# Patient Record
Sex: FEMALE | Race: WHITE | NOT HISPANIC OR LATINO | Employment: UNEMPLOYED | URBAN - METROPOLITAN AREA
[De-identification: names, ages, dates, MRNs, and addresses within clinical notes are randomized per-mention and may not be internally consistent; named-entity substitution may affect disease eponyms.]

---

## 2020-10-14 ENCOUNTER — PATIENT MESSAGE (OUTPATIENT)
Dept: FAMILY MEDICINE | Facility: CLINIC | Age: 65
End: 2020-10-14

## 2020-10-20 ENCOUNTER — OFFICE VISIT (OUTPATIENT)
Dept: FAMILY MEDICINE | Facility: CLINIC | Age: 65
End: 2020-10-20
Payer: COMMERCIAL

## 2020-10-20 ENCOUNTER — TELEPHONE (OUTPATIENT)
Dept: ADMINISTRATIVE | Facility: HOSPITAL | Age: 65
End: 2020-10-20

## 2020-10-20 ENCOUNTER — PATIENT OUTREACH (OUTPATIENT)
Dept: ADMINISTRATIVE | Facility: HOSPITAL | Age: 65
End: 2020-10-20

## 2020-10-20 VITALS
DIASTOLIC BLOOD PRESSURE: 97 MMHG | WEIGHT: 145.5 LBS | TEMPERATURE: 98 F | SYSTOLIC BLOOD PRESSURE: 158 MMHG | RESPIRATION RATE: 18 BRPM | OXYGEN SATURATION: 98 % | HEART RATE: 76 BPM

## 2020-10-20 DIAGNOSIS — Z12.39 ENCOUNTER FOR SCREENING FOR MALIGNANT NEOPLASM OF BREAST, UNSPECIFIED SCREENING MODALITY: ICD-10-CM

## 2020-10-20 DIAGNOSIS — K21.9 GASTROESOPHAGEAL REFLUX DISEASE, UNSPECIFIED WHETHER ESOPHAGITIS PRESENT: ICD-10-CM

## 2020-10-20 DIAGNOSIS — Z00.00 HEALTHCARE MAINTENANCE: Primary | ICD-10-CM

## 2020-10-20 DIAGNOSIS — M06.9 RHEUMATOID ARTHRITIS INVOLVING BOTH HANDS, UNSPECIFIED WHETHER RHEUMATOID FACTOR PRESENT: ICD-10-CM

## 2020-10-20 DIAGNOSIS — R03.0 ELEVATED BLOOD PRESSURE READING: ICD-10-CM

## 2020-10-20 DIAGNOSIS — E78.5 HYPERLIPIDEMIA, UNSPECIFIED HYPERLIPIDEMIA TYPE: ICD-10-CM

## 2020-10-20 PROCEDURE — 99204 OFFICE O/P NEW MOD 45 MIN: CPT | Mod: S$GLB,,, | Performed by: INTERNAL MEDICINE

## 2020-10-20 PROCEDURE — 99999 PR PBB SHADOW E&M-EST. PATIENT-LVL V: CPT | Mod: PBBFAC,,, | Performed by: INTERNAL MEDICINE

## 2020-10-20 PROCEDURE — 99999 PR PBB SHADOW E&M-EST. PATIENT-LVL V: ICD-10-PCS | Mod: PBBFAC,,, | Performed by: INTERNAL MEDICINE

## 2020-10-20 PROCEDURE — 99204 PR OFFICE/OUTPT VISIT, NEW, LEVL IV, 45-59 MIN: ICD-10-PCS | Mod: S$GLB,,, | Performed by: INTERNAL MEDICINE

## 2020-10-20 RX ORDER — GINGER ROOT/GINGER ROOT EXT 262.5 MG
25000 CAPSULE ORAL
COMMUNITY

## 2020-10-20 RX ORDER — PANTOPRAZOLE SODIUM 40 MG/1
40 TABLET, DELAYED RELEASE ORAL DAILY
Qty: 90 TABLET | Refills: 1 | Status: SHIPPED | OUTPATIENT
Start: 2020-10-20 | End: 2021-04-15 | Stop reason: SDUPTHER

## 2020-10-20 RX ORDER — ATORVASTATIN CALCIUM 20 MG/1
20 TABLET, FILM COATED ORAL DAILY
Qty: 90 TABLET | Refills: 1 | Status: SHIPPED | OUTPATIENT
Start: 2020-10-20 | End: 2021-04-20 | Stop reason: SDUPTHER

## 2020-10-20 RX ORDER — CETIRIZINE HYDROCHLORIDE 10 MG/1
10 TABLET ORAL
COMMUNITY

## 2020-10-20 RX ORDER — ATORVASTATIN CALCIUM 20 MG/1
20 TABLET, FILM COATED ORAL DAILY
COMMUNITY
End: 2020-10-20 | Stop reason: SDUPTHER

## 2020-10-20 RX ORDER — PANTOPRAZOLE SODIUM 40 MG/1
TABLET, DELAYED RELEASE ORAL
COMMUNITY
Start: 2019-12-30 | End: 2020-10-20 | Stop reason: SDUPTHER

## 2020-10-20 RX ORDER — LANOLIN ALCOHOL/MO/W.PET/CERES
1000 CREAM (GRAM) TOPICAL
COMMUNITY

## 2020-10-20 RX ORDER — FOLIC ACID 1 MG/1
1000 TABLET ORAL
COMMUNITY
Start: 2019-11-25 | End: 2020-11-03 | Stop reason: SDUPTHER

## 2020-10-20 RX ORDER — ZINC GLUCONATE 50 MG
50 TABLET ORAL
COMMUNITY

## 2020-10-20 RX ORDER — HYDROXYCHLOROQUINE SULFATE 200 MG/1
200 TABLET, FILM COATED ORAL DAILY
COMMUNITY
End: 2020-11-03 | Stop reason: SDUPTHER

## 2020-10-20 RX ORDER — ASCORBIC ACID 500 MG
500 TABLET ORAL
COMMUNITY

## 2020-10-20 RX ORDER — VITAMIN E 268 MG
400 CAPSULE ORAL
COMMUNITY

## 2020-10-20 RX ORDER — CALCIUM CARBONATE 600 MG
600 TABLET ORAL
COMMUNITY

## 2020-10-20 RX ORDER — GARLIC 1000 MG
CAPSULE ORAL
COMMUNITY
End: 2022-04-25

## 2020-10-20 RX ORDER — AMOXICILLIN 500 MG
2 CAPSULE ORAL
COMMUNITY

## 2020-10-20 RX ORDER — SULFASALAZINE 500 MG/1
1000 TABLET ORAL 2 TIMES DAILY
COMMUNITY
End: 2020-11-03 | Stop reason: SDUPTHER

## 2020-10-20 RX ORDER — METHOTREXATE 2.5 MG/1
TABLET ORAL
COMMUNITY
End: 2020-11-03 | Stop reason: SDUPTHER

## 2020-10-20 NOTE — PROGRESS NOTES
HISTORY OF PRESENT ILLNESS:  Lula De Leon is a 64 y.o. female who presents to the clinic today for   Establishing care.    She has moved from [place].    She has RA and had been managed by Dr. Mateus Taylor in [place] previously.  Reports he dx was about 2 years ago.  Notes that her sx have fair control on her current regimen.    She is without other complaints today.    PAST MEDICAL HISTORY:  Past Medical History:   Diagnosis Date    Rheumatoid arthritis        PAST SURGICAL HISTORY:  Past Surgical History:   Procedure Laterality Date    BREAST SURGERY      lobectomy 2017 - [facility]    CARPAL TUNNEL RELEASE Right     SINUS SURGERY  2017    Dr. Christel FLORES and Dr. Chilel       SOCIAL HISTORY:  Social History     Socioeconomic History    Marital status:      Spouse name: Not on file    Number of children: Not on file    Years of education: Not on file    Highest education level: Not on file   Occupational History    Occupation: retired - used to make prosthesis and dental implants   Social Needs    Financial resource strain: Not hard at all    Food insecurity     Worry: Never true     Inability: Never true    Transportation needs     Medical: No     Non-medical: No   Tobacco Use    Smoking status: Never Smoker   Substance and Sexual Activity    Alcohol use: Yes     Frequency: 4 or more times a week     Drinks per session: 1 or 2     Binge frequency: Never    Drug use: Not on file    Sexual activity: Not on file   Lifestyle    Physical activity     Days per week: 0 days     Minutes per session: 0 min    Stress: Only a little   Relationships    Social connections     Talks on phone: More than three times a week     Gets together: More than three times a week     Attends Shinto service: Not on file     Active member of club or organization: No     Attends meetings of clubs or organizations: Never     Relationship status:    Other Topics Concern    Not on file    Social History Narrative    Not on file       FAMILY HISTORY:  Family History   Problem Relation Age of Onset    Arthritis Mother     Aneurysm Mother 64    Heart attack Father 70    Epilepsy Sister        ALLERGIES AND MEDICATIONS: updated and reviewed.  Review of patient's allergies indicates:  No Known Allergies         CARE TEAM:  Patient Care Team:  Sylvain Haywood MD as PCP - General (Internal Medicine)  Ramesh Acuña MA as Care Coordinator  [facility] (Obstetrics)         REVIEW OF SYSTEMS:  Review of Systems   Constitutional: Negative for activity change and unexpected weight change.   HENT: Negative for hearing loss, rhinorrhea and trouble swallowing.    Eyes: Negative for discharge and visual disturbance.   Respiratory: Negative for chest tightness and wheezing.    Cardiovascular: Negative for chest pain and palpitations.   Gastrointestinal: Negative for blood in stool, constipation, diarrhea and vomiting.   Endocrine: Negative for polydipsia and polyuria.   Genitourinary: Negative for difficulty urinating, dysuria, hematuria and menstrual problem.   Musculoskeletal: Positive for arthralgias and joint swelling. Negative for neck pain.   Neurological: Negative for weakness and headaches.   Psychiatric/Behavioral: Negative for confusion and dysphoric mood.         PHYSICAL EXAM:   Vitals:    10/20/20 1110   BP: (!) 158/97   Pulse: 76   Resp: 12   Temp: 97.9             Wt 66 kg     General appearance - alert, well appearing, and in no distress and oriented to person, place, and time  Mental status - normal mood, behavior, speech, dress, motor activity, and thought processes  Eyes - sclera anicteric, left eye normal, right eye normal  Ears - bilateral TM's and external ear canals normal  Mouth - mucous membranes moist, pharynx normal without lesions  Chest - clear to auscultation, no wheezes, rales or rhonchi, symmetric air entry, no tachypnea, retractions or cyanosis  Heart - normal rate,  regular rhythm, normal S1, S2, no murmurs, rubs, clicks or gallops  Abdomen - soft, nontender, nondistended, no masses or organomegaly  Neurological - alert, oriented, normal speech, no focal findings or movement disorder noted  Skin - normal coloration and turgor, no rashes, no suspicious skin lesions noted      ASSESSMENT AND PLAN:  Healthcare maintenance  -     Hemoglobin A1C; Future; Expected date: 10/20/2020  -     Comprehensive Metabolic Panel; Future; Expected date: 10/20/2020  -     Lipid Panel; Future; Expected date: 10/20/2020  -     CBC auto differential; Future; Expected date: 10/20/2020  -     TSH; Future; Expected date: 10/20/2020  -     Cancel: Influenza - Quadrivalent *Preferred* (6 months+) (PF)  -     Hepatitis C Antibody; Future; Expected date: 10/20/2020  -     HIV 1/2 Ag/Ab (4th Gen); Future; Expected date: 10/20/2020  -     Ambulatory referral/consult to Obstetrics / Gynecology; Future; Expected date: 10/27/2020    Elevated blood pressure reading       - Follow up with nurse blood pressure reading and if elevated will have close follow up with me to discuss next steps.    Rheumatoid arthritis involving both hands, unspecified whether rheumatoid factor present  -     Ambulatory referral/consult to Rheumatology; Future; Expected date: 10/27/2020    Encounter for screening for malignant neoplasm of breast, unspecified screening modality  -     Mammo Digital Screening Bilat; Future; Expected date: 10/20/2020    Gastroesophageal reflux disease, unspecified whether esophagitis present  -     pantoprazole (PROTONIX) 40 MG tablet; Take 1 tablet (40 mg total) by mouth once daily.  Dispense: 90 tablet; Refill: 1    Hyperlipidemia, unspecified hyperlipidemia type  -     atorvastatin (LIPITOR) 20 MG tablet; Take 1 tablet (20 mg total) by mouth once daily.  Dispense: 90 tablet; Refill: 1            Follow up 3 months or sooner as needed.

## 2020-10-23 DIAGNOSIS — Z12.11 COLON CANCER SCREENING: ICD-10-CM

## 2020-10-27 ENCOUNTER — HOSPITAL ENCOUNTER (OUTPATIENT)
Dept: RADIOLOGY | Facility: HOSPITAL | Age: 65
Discharge: HOME | End: 2020-10-27
Attending: INTERNAL MEDICINE
Payer: COMMERCIAL

## 2020-10-27 VITALS — HEIGHT: 63 IN | BODY MASS INDEX: 25.69 KG/M2 | WEIGHT: 145 LBS

## 2020-10-27 DIAGNOSIS — Z12.39 ENCOUNTER FOR SCREENING FOR MALIGNANT NEOPLASM OF BREAST, UNSPECIFIED SCREENING MODALITY: ICD-10-CM

## 2020-10-27 DIAGNOSIS — Z12.31 BREAST CANCER SCREENING BY MAMMOGRAM: ICD-10-CM

## 2020-10-27 PROCEDURE — 77067 MAMMO DIGITAL SCREENING BILAT WITH TOMO: ICD-10-PCS | Mod: 26,,, | Performed by: RADIOLOGY

## 2020-10-27 PROCEDURE — 77067 SCR MAMMO BI INCL CAD: CPT | Mod: TC

## 2020-10-27 PROCEDURE — 77063 MAMMO DIGITAL SCREENING BILAT WITH TOMO: ICD-10-PCS | Mod: 26,,, | Performed by: RADIOLOGY

## 2020-10-27 PROCEDURE — 77067 SCR MAMMO BI INCL CAD: CPT | Mod: 26,,, | Performed by: RADIOLOGY

## 2020-10-27 PROCEDURE — 77063 BREAST TOMOSYNTHESIS BI: CPT | Mod: 26,,, | Performed by: RADIOLOGY

## 2020-11-03 ENCOUNTER — OFFICE VISIT (OUTPATIENT)
Dept: RHEUMATOLOGY | Facility: CLINIC | Age: 65
End: 2020-11-03
Payer: COMMERCIAL

## 2020-11-03 ENCOUNTER — HOSPITAL ENCOUNTER (OUTPATIENT)
Dept: RADIOLOGY | Facility: HOSPITAL | Age: 65
Discharge: HOME | End: 2020-11-03
Attending: INTERNAL MEDICINE
Payer: COMMERCIAL

## 2020-11-03 VITALS
WEIGHT: 145.31 LBS | SYSTOLIC BLOOD PRESSURE: 165 MMHG | BODY MASS INDEX: 25.75 KG/M2 | OXYGEN SATURATION: 100 % | RESPIRATION RATE: 18 BRPM | DIASTOLIC BLOOD PRESSURE: 77 MMHG | HEART RATE: 74 BPM | TEMPERATURE: 98 F | HEIGHT: 63 IN

## 2020-11-03 DIAGNOSIS — Z79.899 ENCOUNTER FOR LONG-TERM (CURRENT) USE OF OTHER MEDICATIONS: ICD-10-CM

## 2020-11-03 DIAGNOSIS — M19.90 OSTEOARTHRITIS, UNSPECIFIED OSTEOARTHRITIS TYPE, UNSPECIFIED SITE: ICD-10-CM

## 2020-11-03 DIAGNOSIS — M06.9 RHEUMATOID ARTHRITIS INVOLVING BOTH HANDS, UNSPECIFIED WHETHER RHEUMATOID FACTOR PRESENT: Primary | ICD-10-CM

## 2020-11-03 DIAGNOSIS — M06.9 RHEUMATOID ARTHRITIS INVOLVING BOTH HANDS, UNSPECIFIED WHETHER RHEUMATOID FACTOR PRESENT: ICD-10-CM

## 2020-11-03 DIAGNOSIS — Z71.89 COUNSELING AND COORDINATION OF CARE: ICD-10-CM

## 2020-11-03 PROCEDURE — 3008F BODY MASS INDEX DOCD: CPT | Mod: CPTII,S$GLB,, | Performed by: INTERNAL MEDICINE

## 2020-11-03 PROCEDURE — 73130 PR  X-RAY HAND 3+ VW: ICD-10-PCS | Mod: 26,RT,, | Performed by: RADIOLOGY

## 2020-11-03 PROCEDURE — 73130 X-RAY EXAM OF HAND: CPT | Mod: 26,RT,, | Performed by: RADIOLOGY

## 2020-11-03 PROCEDURE — 73130 X-RAY EXAM OF HAND: CPT | Mod: 26,LT,, | Performed by: RADIOLOGY

## 2020-11-03 PROCEDURE — 73130 X-RAY EXAM OF HAND: CPT | Mod: TC,50,FY

## 2020-11-03 PROCEDURE — 99999 PR PBB SHADOW E&M-EST. PATIENT-LVL V: ICD-10-PCS | Mod: PBBFAC,,, | Performed by: INTERNAL MEDICINE

## 2020-11-03 PROCEDURE — 73630 PR  X-RAY FOOT 3+ VW: ICD-10-PCS | Mod: 26,LT,, | Performed by: RADIOLOGY

## 2020-11-03 PROCEDURE — 73630 X-RAY EXAM OF FOOT: CPT | Mod: TC,50,FY

## 2020-11-03 PROCEDURE — 73630 X-RAY EXAM OF FOOT: CPT | Mod: 26,RT,, | Performed by: RADIOLOGY

## 2020-11-03 PROCEDURE — 99204 OFFICE O/P NEW MOD 45 MIN: CPT | Mod: S$GLB,,, | Performed by: INTERNAL MEDICINE

## 2020-11-03 PROCEDURE — 99204 PR OFFICE/OUTPT VISIT, NEW, LEVL IV, 45-59 MIN: ICD-10-PCS | Mod: S$GLB,,, | Performed by: INTERNAL MEDICINE

## 2020-11-03 PROCEDURE — 73630 X-RAY EXAM OF FOOT: CPT | Mod: 26,LT,, | Performed by: RADIOLOGY

## 2020-11-03 PROCEDURE — 3008F PR BODY MASS INDEX (BMI) DOCUMENTED: ICD-10-PCS | Mod: CPTII,S$GLB,, | Performed by: INTERNAL MEDICINE

## 2020-11-03 PROCEDURE — 99999 PR PBB SHADOW E&M-EST. PATIENT-LVL V: CPT | Mod: PBBFAC,,, | Performed by: INTERNAL MEDICINE

## 2020-11-03 RX ORDER — METHOTREXATE 2.5 MG/1
10 TABLET ORAL
Qty: 48 TABLET | Refills: 1 | Status: SHIPPED | OUTPATIENT
Start: 2020-11-03 | End: 2021-02-03 | Stop reason: SDUPTHER

## 2020-11-03 RX ORDER — FOLIC ACID 1 MG/1
1 TABLET ORAL DAILY
Qty: 90 TABLET | Refills: 1 | Status: SHIPPED | OUTPATIENT
Start: 2020-11-03 | End: 2021-02-03 | Stop reason: SDUPTHER

## 2020-11-03 RX ORDER — SULFASALAZINE 500 MG/1
500 TABLET ORAL 2 TIMES DAILY
Qty: 180 TABLET | Refills: 1 | Status: SHIPPED | OUTPATIENT
Start: 2020-11-03 | End: 2021-02-03 | Stop reason: SDUPTHER

## 2020-11-03 RX ORDER — HYDROXYCHLOROQUINE SULFATE 200 MG/1
200 TABLET, FILM COATED ORAL DAILY
Qty: 90 TABLET | Refills: 1 | Status: SHIPPED | OUTPATIENT
Start: 2020-11-03 | End: 2021-02-03 | Stop reason: SDUPTHER

## 2020-11-03 ASSESSMENT — CLINICAL DISEASE ACTIVITY INDEX (CDAI)
TENDER_JOINTS_COUNT: 0
PHYSICIAN_ASSESSMENT: 1
PATIENT_ASSESSMENT: 1
SWOLLEN_JOINTS_COUNT: 0
TOTAL_SCORE: 2

## 2020-11-03 NOTE — LETTER
November 3, 2020      Sylvain Haywood MD  [address] [facility] [address]           [facility] - Rheumatology  [address] [facility]  [facility] [address]  Fax: [phone]          Patient: Lula De Leon   MR Number: [id number]   YOB: 1955   Date of Visit: 11/3/2020       Dear Dr. Sylvain Haywood:    Thank you for referring Lula De Leon to me for evaluation. Attached you will find relevant portions of my assessment and plan of care.    If you have questions, please do not hesitate to call me. I look forward to following Lula De Leon along with you.    Sincerely,    Ata Hooks MD    Enclosure  CC:  No Recipients    If you would like to receive this communication electronically, please contact [email or web address] or [phone] to request more information on [employer] access.    For providers and/or their staff who would like to refer a patient to [facility], please contact us through our one-stop-shop provider referral line, [facility], at [phone].    If you feel you have received this communication in error or would no longer like to receive these types of communications, please e-mail [email or web address]

## 2020-11-03 NOTE — PROGRESS NOTES
RHEUMATOLOGY OUTPATIENT CLINIC NOTE    11/3/2020    Attending Rheumatologist: Ata Hooks  Primary Care Provider: Sylvain Haywood MD   Physician Requesting Consultation: Sylvain Haywood MD  [address] [place] [address]  Chief Complaint/Reason For Consultation:  No chief complaint on file.      Subjective:       HPI  Lula De Leon is a 64 y.o. White female with Hx of Seropositive RA who comes to hospitals care.     Patient diagnosed two years ago. Reports it started in the wrist as swelling and pain, could barely move it. Then was seen by a Rheumatologist which placed her on therapy eventually escalating to Triple Therapy (SSZ, MTX, HCQ) for control. Was on MTX 8 tabs, SSZ 4 and decreased to MTX 4 and SSZ 2 in August due to Renal Function.   Patient reports good response on current therapy. Denies joint swelling, tenderness. Reports about 10-15 of morning stiffness. Reports also lower back and knee pain. Most typically with use. Was prior on NSAIDs but stopped, now on Tylenol with good relief.   FHx: Possible Mom, and Aunt with RA     Review of Systems   Constitutional: Negative for appetite change, chills, fatigue, fever and unexpected weight change.   HENT: Negative for nasal congestion, ear discharge, ear pain, hearing loss, mouth sores, nosebleeds, sneezing, sore throat, tinnitus and trouble swallowing.    Eyes: Negative for photophobia, pain, discharge, redness, itching and visual disturbance.   Respiratory: Negative for cough, chest tightness, shortness of breath and wheezing.    Cardiovascular: Negative for chest pain, palpitations and leg swelling.   Gastrointestinal: Negative for abdominal distention, abdominal pain, blood in stool, constipation, diarrhea, nausea and vomiting.   Endocrine: Negative for cold intolerance, heat intolerance, polydipsia, polyphagia and polyuria.   Genitourinary: Negative for difficulty urinating, dyspareunia, dysuria, flank pain, frequency, genital  sores, hematuria, menstrual problem, pelvic pain, urgency, vaginal bleeding, vaginal discharge, vaginal pain and vaginal dryness.   Musculoskeletal: Positive for arthralgias and back pain. Negative for gait problem, joint swelling, leg pain, myalgias, neck pain, neck stiffness and joint deformity.   Integumentary:  Negative for pallor and rash.   Neurological: Negative for dizziness, seizures, weakness, light-headedness, numbness and headaches.   Hematological: Negative for adenopathy. Does not bruise/bleed easily.   Psychiatric/Behavioral: Negative for confusion, decreased concentration and sleep disturbance. The patient is not nervous/anxious.    All other systems reviewed and are negative.         Chronic comorbid conditions affecting medical decision making today:  Past Medical History:   Diagnosis Date    Rheumatoid arthritis      Past Surgical History:   Procedure Laterality Date    BREAST LUMPECTOMY Right     exc biopsy    BREAST SURGERY      [facility]obectomy 2017 - [facility]    CARPAL TUNNEL RELEASE Right     SINUS SURGERY  2017    Dr. K[place] [facility] and Dr. Chilel     Family History   Problem Relation Age of Onset    Arthritis Mother     Aneurysm Mother 64    Heart attack Father 70    Epilepsy Sister      Social History     Substance and Sexual Activity   Alcohol Use Yes    Frequency: 4 or more times a week    Drinks per session: 1 or 2    Binge frequency: Never     Social History     Tobacco Use   Smoking Status Never Smoker     Social History     Substance and Sexual Activity   Drug Use Not on file       Current Outpatient Medications:     ascorbic acid, vitamin C, (VITAMIN C) 500 MG tablet, Take 500 mg by mouth., Disp: , Rfl:     atorvastatin (LIPITOR) 20 MG tablet, Take 1 tablet (20 mg total) by mouth once daily., Disp: 90 tablet, Rfl: 1    beta carotene 45418 UNIT capsule, Take 25,000 Units by mouth., Disp: , Rfl:     calcium carbonate (OS-ONEIDA) 600 mg calcium (1,500 mg) Tab, Take 600  mg by mouth., Disp: , Rfl:     cetirizine (ZYRTEC) 10 MG tablet, Take 10 mg by mouth., Disp: , Rfl:     cyanocobalamin (VITAMIN B-12) 1000 MCG tablet, Take 1,000 mcg by mouth., Disp: , Rfl:     richa prim-linoleic-gamolenic ac (PRIMROSE OIL) 1,000 mg Cap, Take by mouth., Disp: , Rfl:     flaxseed oil Oil, 800 mg by Other route., Disp: , Rfl:     folic acid (FOLVITE) 1 MG tablet, Take 1,000 mcg by mouth., Disp: , Rfl:     garlic 1,000 mg Cap, Take by mouth., Disp: , Rfl:     hydrOXYchloroQUINE (PLAQUENIL) 200 mg tablet, Take 200 mg by mouth once daily., Disp: , Rfl:     iron bisgly,ps-FA-B-C#12-succ 65 mg-65 mg -1,000 mcg (24) Tab, Take by mouth., Disp: , Rfl:     MAGNESIUM CARBONATE ORAL, Take 600 mg by mouth., Disp: , Rfl:     methotrexate 2.5 MG Tab, Take by mouth every 7 days., Disp: , Rfl:     omega-3 fatty acids/fish oil (FISH OIL-OMEGA-3 FATTY ACIDS) 300-1,000 mg capsule, Take 2 g by mouth., Disp: , Rfl:     pantoprazole (PROTONIX) 40 MG tablet, Take 1 tablet (40 mg total) by mouth once daily., Disp: 90 tablet, Rfl: 1    sulfaSALAzine (AZULFIDINE) 500 mg Tab, Take 1,000 mg by mouth 2 (two) times a day. , Disp: , Rfl:     vitamin E 400 UNIT capsule, Take 400 Units by mouth., Disp: , Rfl:     zinc gluconate 50 mg tablet, Take 50 mg by mouth., Disp: , Rfl:      Objective:         Vitals:    11/03/20 0812   BP: (!) 165/77   Pulse: 74   Resp: 18   Temp: 98 °F (36.7 °C)     Physical Exam   Constitutional: She is oriented to person, place, and time and well-developed, well-nourished, and in no distress.   HENT:   Head: Normocephalic and atraumatic.   Right Ear: External ear normal.   Left Ear: External ear normal.   Nose: Nose normal.   Mouth/Throat: Oropharynx is clear and moist.   Eyes: Conjunctivae and EOM are normal. Pupils are equal, round, and reactive to light.   Neck: Normal range of motion. Neck supple.   Cardiovascular: Normal rate, regular rhythm and intact distal pulses.    Pulmonary/Chest:  Effort normal and breath sounds normal.   Abdominal: Soft. Bowel sounds are normal.       Right Side Rheumatological Exam     Examination finds the shoulder, elbow, wrist, knee, 1st PIP, 1st MCP, 2nd PIP, 2nd MCP, 3rd PIP, 3rd MCP, 4th PIP, 4th MCP, 5th PIP and 5th MCP normal.    Knee Exam   Patellofemoral Crepitus: positive    Left Side Rheumatological Exam     Examination finds the shoulder, elbow, wrist, knee, 1st PIP, 1st MCP, 2nd PIP, 2nd MCP, 3rd PIP, 3rd MCP, 4th PIP, 4th MCP, 5th PIP and 5th MCP normal.    Knee Exam     Patellofemoral Crepitus: positive      Back/Neck Exam   Tenderness Left paramedian tenderness of the Lower L-Spine.      Neurological: She is alert and oriented to person, place, and time.   Skin: No rash noted. No erythema.     Psychiatric: Mood and affect normal.       Reviewed old and all outside pertinent medical records available.    All lab results personally reviewed and interpreted by me.  Lab Results   Component Value Date    WBC 3.76 (L) 10/27/2020    HGB 12.1 10/27/2020    HCT 38.0 10/27/2020    MCV 98 10/27/2020    MCH 31.3 (H) 10/27/2020    MCHC 31.8 (L) 10/27/2020    RDW 13.4 10/27/2020     10/27/2020    MPV 9.3 10/27/2020       Lab Results   Component Value Date     10/27/2020    K 4.3 10/27/2020     10/27/2020    CO2 28 10/27/2020    GLU 99 10/27/2020    BUN 18 10/27/2020    CALCIUM 9.5 10/27/2020    PROT 7.3 10/27/2020    ALBUMIN 3.9 10/27/2020    BILITOT 0.4 10/27/2020    AST 31 10/27/2020    ALKPHOS 49 (L) 10/27/2020    ALT 28 10/27/2020       No results found for: COLORU, APPEARANCEUA, SPECGRAV, PHUR, PROTEINUA, GLUCOSEU, KETONESU, BLOODU, LEUKOCYTESUR, NITRITE, UROBILINOGEN    No results found for: CRP    No results found for: SEDRATE, ERYTHROCYTES    No results found for: LUIS FERNANDO, RF, SEDRATE    No components found for: 25OHVITDTOT, 62TLAPMF7, 36YLHBKV3, METHODNOTE    No results found for: URICACID    No components found for: TSPOTTB    Imaging:  All  imaging reviewed and independently interpreted by me.       ASSESSMENT / PLAN:     Lula De Leon is a 64 y.o. White female with:      1. Rheumatoid arthritis involving both hands, unspecified whether rheumatoid factor present  -Patient with known diagnosis of seropositive RA, discussed current management and her feeling well  -will get baseline labs today as well as hand and foot Xrays   -Continue MTX 10mg weekly + Daily FA (1mg), SSZ 500mg BID daily, HCQ 200mg daily   -reinforced compliance with meds   -CDAI: TJC 0 + SJC 0 + PGA 1 + PhGA 1 = 2 (in remission)   -avoid NSAIDs, Tylenol for breakthrough pain     2. Low Back and Knee Pain likely OA   -discussed disease diagnosis and management   -continue Tylenol PRN  -Reassurance and Exercise     3. DMARD Toxicity Monitoring   -labs today   -Daily FA  -EYE referral for HCQ Retina Screening     4. Other specified counseling  - over 10 minutes spent regarding below topics:  - Immunization counseling done.  - Nutrition and exercise counseling.  - Limitation of alcohol consumption.  - Regular exercise:  Aerobic and resistance.  - Medication counseling provided.      No follow-ups on file.    Method of contact with patient concerns: Des attn Rheumatology    Disclaimer:  This note is prepared using voice recognition software and as such is likely to have errors and has not been proof read. Please contact me for questions.     Time spent: 45 minutes in face to face discussion concerning diagnosis, prognosis, review of lab and test results, benefits of treatment as well as management of disease, counseling of patient and coordination of care between various health care providers.  Greater than half the time spent was used for coordination of care and counseling of patient.    Ata Hooks M.D.  Rheumatology Department   [facility]

## 2020-11-06 ENCOUNTER — TELEPHONE (OUTPATIENT)
Dept: RHEUMATOLOGY | Facility: CLINIC | Age: 65
End: 2020-11-06

## 2021-01-04 ENCOUNTER — PATIENT MESSAGE (OUTPATIENT)
Dept: ADMINISTRATIVE | Facility: HOSPITAL | Age: 66
End: 2021-01-04

## 2021-01-25 PROBLEM — Z00.00 HEALTHCARE MAINTENANCE: Status: RESOLVED | Noted: 2020-10-20 | Resolved: 2021-01-25

## 2021-02-03 ENCOUNTER — OFFICE VISIT (OUTPATIENT)
Dept: RHEUMATOLOGY | Facility: CLINIC | Age: 66
End: 2021-02-03
Payer: COMMERCIAL

## 2021-02-03 ENCOUNTER — TELEPHONE (OUTPATIENT)
Dept: RHEUMATOLOGY | Facility: CLINIC | Age: 66
End: 2021-02-03

## 2021-02-03 ENCOUNTER — LAB VISIT (OUTPATIENT)
Dept: LAB | Facility: HOSPITAL | Age: 66
End: 2021-02-03
Attending: INTERNAL MEDICINE
Payer: COMMERCIAL

## 2021-02-03 VITALS
OXYGEN SATURATION: 98 % | SYSTOLIC BLOOD PRESSURE: 175 MMHG | HEART RATE: 73 BPM | DIASTOLIC BLOOD PRESSURE: 86 MMHG | TEMPERATURE: 98 F | RESPIRATION RATE: 18 BRPM | HEIGHT: 63 IN | WEIGHT: 146.19 LBS | BODY MASS INDEX: 25.9 KG/M2

## 2021-02-03 DIAGNOSIS — Z79.899 ENCOUNTER FOR LONG-TERM (CURRENT) USE OF OTHER MEDICATIONS: ICD-10-CM

## 2021-02-03 DIAGNOSIS — M06.9 RHEUMATOID ARTHRITIS INVOLVING BOTH HANDS, UNSPECIFIED WHETHER RHEUMATOID FACTOR PRESENT: Primary | ICD-10-CM

## 2021-02-03 DIAGNOSIS — M06.9 RHEUMATOID ARTHRITIS INVOLVING BOTH HANDS, UNSPECIFIED WHETHER RHEUMATOID FACTOR PRESENT: ICD-10-CM

## 2021-02-03 DIAGNOSIS — M15.9 OSTEOARTHRITIS OF MULTIPLE JOINTS, UNSPECIFIED OSTEOARTHRITIS TYPE: ICD-10-CM

## 2021-02-03 DIAGNOSIS — Z71.89 COUNSELING AND COORDINATION OF CARE: ICD-10-CM

## 2021-02-03 LAB
ALBUMIN SERPL BCP-MCNC: 4.1 G/DL (ref 3.5–5.2)
ALP SERPL-CCNC: 53 U/L (ref 55–135)
ALT SERPL W/O P-5'-P-CCNC: 50 U/L (ref 10–44)
ANION GAP SERPL CALC-SCNC: 10 MMOL/L (ref 8–16)
AST SERPL-CCNC: 36 U/L (ref 10–40)
BASOPHILS # BLD AUTO: 0.05 K/UL (ref 0–0.2)
BASOPHILS NFR BLD: 0.9 % (ref 0–1.9)
BILIRUB SERPL-MCNC: 0.4 MG/DL (ref 0.1–1)
BUN SERPL-MCNC: 14 MG/DL (ref 8–23)
CALCIUM SERPL-MCNC: 9.3 MG/DL (ref 8.7–10.5)
CHLORIDE SERPL-SCNC: 105 MMOL/L (ref 95–110)
CO2 SERPL-SCNC: 27 MMOL/L (ref 23–29)
CREAT SERPL-MCNC: 0.8 MG/DL (ref 0.5–1.4)
DIFFERENTIAL METHOD: ABNORMAL
EOSINOPHIL # BLD AUTO: 0.1 K/UL (ref 0–0.5)
EOSINOPHIL NFR BLD: 1.8 % (ref 0–8)
ERYTHROCYTE [DISTWIDTH] IN BLOOD BY AUTOMATED COUNT: 13.6 % (ref 11.5–14.5)
EST. GFR  (AFRICAN AMERICAN): >60 ML/MIN/1.73 M^2
EST. GFR  (NON AFRICAN AMERICAN): >60 ML/MIN/1.73 M^2
GLUCOSE SERPL-MCNC: 92 MG/DL (ref 70–110)
HCT VFR BLD AUTO: 37.7 % (ref 37–48.5)
HGB BLD-MCNC: 11.9 G/DL (ref 12–16)
IMM GRANULOCYTES # BLD AUTO: 0.01 K/UL (ref 0–0.04)
IMM GRANULOCYTES NFR BLD AUTO: 0.2 % (ref 0–0.5)
LYMPHOCYTES # BLD AUTO: 2.4 K/UL (ref 1–4.8)
LYMPHOCYTES NFR BLD: 43.3 % (ref 18–48)
MCH RBC QN AUTO: 32 PG (ref 27–31)
MCHC RBC AUTO-ENTMCNC: 31.6 G/DL (ref 32–36)
MCV RBC AUTO: 101 FL (ref 82–98)
MONOCYTES # BLD AUTO: 0.6 K/UL (ref 0.3–1)
MONOCYTES NFR BLD: 10.8 % (ref 4–15)
NEUTROPHILS # BLD AUTO: 2.4 K/UL (ref 1.8–7.7)
NEUTROPHILS NFR BLD: 43 % (ref 38–73)
NRBC BLD-RTO: 0 /100 WBC
PLATELET # BLD AUTO: 297 K/UL (ref 150–350)
PMV BLD AUTO: 9.9 FL (ref 9.2–12.9)
POTASSIUM SERPL-SCNC: 4.8 MMOL/L (ref 3.5–5.1)
PROT SERPL-MCNC: 7.4 G/DL (ref 6–8.4)
RBC # BLD AUTO: 3.72 M/UL (ref 4–5.4)
SODIUM SERPL-SCNC: 142 MMOL/L (ref 136–145)
WBC # BLD AUTO: 5.56 K/UL (ref 3.9–12.7)

## 2021-02-03 PROCEDURE — 1125F AMNT PAIN NOTED PAIN PRSNT: CPT | Mod: S$GLB,,, | Performed by: INTERNAL MEDICINE

## 2021-02-03 PROCEDURE — 36415 COLL VENOUS BLD VENIPUNCTURE: CPT | Mod: PN

## 2021-02-03 PROCEDURE — 80053 COMPREHEN METABOLIC PANEL: CPT

## 2021-02-03 PROCEDURE — 99999 PR PBB SHADOW E&M-EST. PATIENT-LVL IV: CPT | Mod: PBBFAC,,, | Performed by: INTERNAL MEDICINE

## 2021-02-03 PROCEDURE — 3008F PR BODY MASS INDEX (BMI) DOCUMENTED: ICD-10-PCS | Mod: CPTII,S$GLB,, | Performed by: INTERNAL MEDICINE

## 2021-02-03 PROCEDURE — 3008F BODY MASS INDEX DOCD: CPT | Mod: CPTII,S$GLB,, | Performed by: INTERNAL MEDICINE

## 2021-02-03 PROCEDURE — 99214 OFFICE O/P EST MOD 30 MIN: CPT | Mod: S$GLB,,, | Performed by: INTERNAL MEDICINE

## 2021-02-03 PROCEDURE — 85025 COMPLETE CBC W/AUTO DIFF WBC: CPT

## 2021-02-03 PROCEDURE — 99999 PR PBB SHADOW E&M-EST. PATIENT-LVL IV: ICD-10-PCS | Mod: PBBFAC,,, | Performed by: INTERNAL MEDICINE

## 2021-02-03 PROCEDURE — 1125F PR PAIN SEVERITY QUANTIFIED, PAIN PRESENT: ICD-10-PCS | Mod: S$GLB,,, | Performed by: INTERNAL MEDICINE

## 2021-02-03 PROCEDURE — 99214 PR OFFICE/OUTPT VISIT, EST, LEVL IV, 30-39 MIN: ICD-10-PCS | Mod: S$GLB,,, | Performed by: INTERNAL MEDICINE

## 2021-02-03 RX ORDER — FOLIC ACID 1 MG/1
1 TABLET ORAL DAILY
Qty: 90 TABLET | Refills: 1 | Status: SHIPPED | OUTPATIENT
Start: 2021-02-03 | End: 2021-11-23 | Stop reason: SDUPTHER

## 2021-02-03 RX ORDER — METHOTREXATE 2.5 MG/1
15 TABLET ORAL
Qty: 84 TABLET | Refills: 1 | Status: SHIPPED | OUTPATIENT
Start: 2021-02-03 | End: 2021-05-03 | Stop reason: SDUPTHER

## 2021-02-03 RX ORDER — HYDROXYCHLOROQUINE SULFATE 200 MG/1
200 TABLET, FILM COATED ORAL DAILY
Qty: 90 TABLET | Refills: 1 | Status: SHIPPED | OUTPATIENT
Start: 2021-02-03 | End: 2021-05-03 | Stop reason: SDUPTHER

## 2021-02-03 RX ORDER — SULFASALAZINE 500 MG/1
500 TABLET ORAL 2 TIMES DAILY
Qty: 180 TABLET | Refills: 1 | Status: SHIPPED | OUTPATIENT
Start: 2021-02-03 | End: 2021-05-03 | Stop reason: SDUPTHER

## 2021-02-03 RX ORDER — DICLOFENAC SODIUM 10 MG/G
2 GEL TOPICAL 4 TIMES DAILY
Qty: 1 TUBE | Refills: 3 | Status: SHIPPED | OUTPATIENT
Start: 2021-02-03

## 2021-02-03 ASSESSMENT — CLINICAL DISEASE ACTIVITY INDEX (CDAI)
PATIENT_ASSESSMENT: 1
TOTAL_SCORE: 8
SWOLLEN_JOINTS_COUNT: 0
PHYSICIAN_ASSESSMENT: 3
TENDER_JOINTS_COUNT: 4

## 2021-04-15 DIAGNOSIS — K21.9 GASTROESOPHAGEAL REFLUX DISEASE, UNSPECIFIED WHETHER ESOPHAGITIS PRESENT: ICD-10-CM

## 2021-04-15 RX ORDER — PANTOPRAZOLE SODIUM 40 MG/1
40 TABLET, DELAYED RELEASE ORAL DAILY
Qty: 90 TABLET | Refills: 1 | Status: SHIPPED | OUTPATIENT
Start: 2021-04-15 | End: 2021-10-20 | Stop reason: SDUPTHER

## 2021-04-20 ENCOUNTER — PATIENT OUTREACH (OUTPATIENT)
Dept: ADMINISTRATIVE | Facility: HOSPITAL | Age: 66
End: 2021-04-20

## 2021-04-20 ENCOUNTER — OFFICE VISIT (OUTPATIENT)
Dept: FAMILY MEDICINE | Facility: CLINIC | Age: 66
End: 2021-04-20
Payer: COMMERCIAL

## 2021-04-20 VITALS
TEMPERATURE: 98 F | WEIGHT: 148.13 LBS | RESPIRATION RATE: 17 BRPM | HEART RATE: 73 BPM | OXYGEN SATURATION: 97 % | BODY MASS INDEX: 26.67 KG/M2 | DIASTOLIC BLOOD PRESSURE: 82 MMHG | SYSTOLIC BLOOD PRESSURE: 138 MMHG

## 2021-04-20 DIAGNOSIS — E78.5 HYPERLIPIDEMIA, UNSPECIFIED HYPERLIPIDEMIA TYPE: Primary | ICD-10-CM

## 2021-04-20 DIAGNOSIS — Z12.11 COLON CANCER SCREENING: ICD-10-CM

## 2021-04-20 DIAGNOSIS — Z00.00 HEALTHCARE MAINTENANCE: ICD-10-CM

## 2021-04-20 DIAGNOSIS — M06.9 RHEUMATOID ARTHRITIS INVOLVING BOTH HANDS, UNSPECIFIED WHETHER RHEUMATOID FACTOR PRESENT: ICD-10-CM

## 2021-04-20 PROCEDURE — 99999 PR PBB SHADOW E&M-EST. PATIENT-LVL V: ICD-10-PCS | Mod: PBBFAC,,, | Performed by: INTERNAL MEDICINE

## 2021-04-20 PROCEDURE — 99999 PR PBB SHADOW E&M-EST. PATIENT-LVL V: CPT | Mod: PBBFAC,,, | Performed by: INTERNAL MEDICINE

## 2021-04-20 PROCEDURE — 99214 OFFICE O/P EST MOD 30 MIN: CPT | Mod: S$GLB,,, | Performed by: INTERNAL MEDICINE

## 2021-04-20 PROCEDURE — 99214 PR OFFICE/OUTPT VISIT, EST, LEVL IV, 30-39 MIN: ICD-10-PCS | Mod: S$GLB,,, | Performed by: INTERNAL MEDICINE

## 2021-04-20 PROCEDURE — 3008F BODY MASS INDEX DOCD: CPT | Mod: CPTII,S$GLB,, | Performed by: INTERNAL MEDICINE

## 2021-04-20 PROCEDURE — 3008F PR BODY MASS INDEX (BMI) DOCUMENTED: ICD-10-PCS | Mod: CPTII,S$GLB,, | Performed by: INTERNAL MEDICINE

## 2021-04-20 RX ORDER — ATORVASTATIN CALCIUM 20 MG/1
20 TABLET, FILM COATED ORAL DAILY
Qty: 90 TABLET | Refills: 1 | Status: SHIPPED | OUTPATIENT
Start: 2021-04-20 | End: 2021-10-20 | Stop reason: SDUPTHER

## 2021-05-02 ENCOUNTER — PATIENT MESSAGE (OUTPATIENT)
Dept: ADMINISTRATIVE | Facility: HOSPITAL | Age: 66
End: 2021-05-02

## 2021-05-03 ENCOUNTER — OFFICE VISIT (OUTPATIENT)
Dept: RHEUMATOLOGY | Facility: CLINIC | Age: 66
End: 2021-05-03
Payer: COMMERCIAL

## 2021-05-03 ENCOUNTER — LAB VISIT (OUTPATIENT)
Dept: LAB | Facility: HOSPITAL | Age: 66
End: 2021-05-03
Attending: INTERNAL MEDICINE
Payer: COMMERCIAL

## 2021-05-03 VITALS
HEIGHT: 63 IN | BODY MASS INDEX: 26.06 KG/M2 | SYSTOLIC BLOOD PRESSURE: 159 MMHG | WEIGHT: 147.06 LBS | DIASTOLIC BLOOD PRESSURE: 82 MMHG | RESPIRATION RATE: 18 BRPM | TEMPERATURE: 99 F | HEART RATE: 74 BPM | OXYGEN SATURATION: 98 %

## 2021-05-03 DIAGNOSIS — Z71.89 COUNSELING AND COORDINATION OF CARE: ICD-10-CM

## 2021-05-03 DIAGNOSIS — M06.9 RHEUMATOID ARTHRITIS INVOLVING BOTH HANDS, UNSPECIFIED WHETHER RHEUMATOID FACTOR PRESENT: ICD-10-CM

## 2021-05-03 DIAGNOSIS — M15.9 PRIMARY OSTEOARTHRITIS INVOLVING MULTIPLE JOINTS: ICD-10-CM

## 2021-05-03 DIAGNOSIS — M06.9 RHEUMATOID ARTHRITIS INVOLVING BOTH HANDS, UNSPECIFIED WHETHER RHEUMATOID FACTOR PRESENT: Primary | ICD-10-CM

## 2021-05-03 DIAGNOSIS — Z79.899 ENCOUNTER FOR LONG-TERM (CURRENT) USE OF OTHER MEDICATIONS: ICD-10-CM

## 2021-05-03 LAB
ALBUMIN SERPL BCP-MCNC: 3.8 G/DL (ref 3.5–5.2)
ALP SERPL-CCNC: 50 U/L (ref 55–135)
ALT SERPL W/O P-5'-P-CCNC: 33 U/L (ref 10–44)
ANION GAP SERPL CALC-SCNC: 4 MMOL/L (ref 8–16)
AST SERPL-CCNC: 28 U/L (ref 10–40)
BASOPHILS # BLD AUTO: 0.03 K/UL (ref 0–0.2)
BASOPHILS NFR BLD: 0.6 % (ref 0–1.9)
BILIRUB SERPL-MCNC: 0.5 MG/DL (ref 0.1–1)
BUN SERPL-MCNC: 17 MG/DL (ref 8–23)
CALCIUM SERPL-MCNC: 9.4 MG/DL (ref 8.7–10.5)
CHLORIDE SERPL-SCNC: 107 MMOL/L (ref 95–110)
CO2 SERPL-SCNC: 30 MMOL/L (ref 23–29)
CREAT SERPL-MCNC: 0.8 MG/DL (ref 0.5–1.4)
DIFFERENTIAL METHOD: ABNORMAL
EOSINOPHIL # BLD AUTO: 0.1 K/UL (ref 0–0.5)
EOSINOPHIL NFR BLD: 1.4 % (ref 0–8)
ERYTHROCYTE [DISTWIDTH] IN BLOOD BY AUTOMATED COUNT: 14 % (ref 11.5–14.5)
EST. GFR  (AFRICAN AMERICAN): >60 ML/MIN/1.73 M^2
EST. GFR  (NON AFRICAN AMERICAN): >60 ML/MIN/1.73 M^2
GLUCOSE SERPL-MCNC: 93 MG/DL (ref 70–110)
HCT VFR BLD AUTO: 36.9 % (ref 37–48.5)
HGB BLD-MCNC: 12 G/DL (ref 12–16)
IMM GRANULOCYTES # BLD AUTO: 0.01 K/UL (ref 0–0.04)
IMM GRANULOCYTES NFR BLD AUTO: 0.2 % (ref 0–0.5)
LYMPHOCYTES # BLD AUTO: 2 K/UL (ref 1–4.8)
LYMPHOCYTES NFR BLD: 38.9 % (ref 18–48)
MCH RBC QN AUTO: 32.4 PG (ref 27–31)
MCHC RBC AUTO-ENTMCNC: 32.5 G/DL (ref 32–36)
MCV RBC AUTO: 100 FL (ref 82–98)
MONOCYTES # BLD AUTO: 0.5 K/UL (ref 0.3–1)
MONOCYTES NFR BLD: 9.9 % (ref 4–15)
NEUTROPHILS # BLD AUTO: 2.5 K/UL (ref 1.8–7.7)
NEUTROPHILS NFR BLD: 49 % (ref 38–73)
NRBC BLD-RTO: 0 /100 WBC
PLATELET # BLD AUTO: 274 K/UL (ref 150–450)
PMV BLD AUTO: 9.8 FL (ref 9.2–12.9)
POTASSIUM SERPL-SCNC: 4.4 MMOL/L (ref 3.5–5.1)
PROT SERPL-MCNC: 7.2 G/DL (ref 6–8.4)
RBC # BLD AUTO: 3.7 M/UL (ref 4–5.4)
SODIUM SERPL-SCNC: 141 MMOL/L (ref 136–145)
WBC # BLD AUTO: 5.06 K/UL (ref 3.9–12.7)

## 2021-05-03 PROCEDURE — 80053 COMPREHEN METABOLIC PANEL: CPT | Performed by: INTERNAL MEDICINE

## 2021-05-03 PROCEDURE — 1126F PR PAIN SEVERITY QUANTIFIED, NO PAIN PRESENT: ICD-10-PCS | Mod: S$GLB,,, | Performed by: INTERNAL MEDICINE

## 2021-05-03 PROCEDURE — 99214 OFFICE O/P EST MOD 30 MIN: CPT | Mod: S$GLB,,, | Performed by: INTERNAL MEDICINE

## 2021-05-03 PROCEDURE — 99999 PR PBB SHADOW E&M-EST. PATIENT-LVL IV: ICD-10-PCS | Mod: PBBFAC,,, | Performed by: INTERNAL MEDICINE

## 2021-05-03 PROCEDURE — 85025 COMPLETE CBC W/AUTO DIFF WBC: CPT | Performed by: INTERNAL MEDICINE

## 2021-05-03 PROCEDURE — 99999 PR PBB SHADOW E&M-EST. PATIENT-LVL IV: CPT | Mod: PBBFAC,,, | Performed by: INTERNAL MEDICINE

## 2021-05-03 PROCEDURE — 36415 COLL VENOUS BLD VENIPUNCTURE: CPT | Mod: PN | Performed by: INTERNAL MEDICINE

## 2021-05-03 PROCEDURE — 3008F BODY MASS INDEX DOCD: CPT | Mod: CPTII,S$GLB,, | Performed by: INTERNAL MEDICINE

## 2021-05-03 PROCEDURE — 3008F PR BODY MASS INDEX (BMI) DOCUMENTED: ICD-10-PCS | Mod: CPTII,S$GLB,, | Performed by: INTERNAL MEDICINE

## 2021-05-03 PROCEDURE — 99214 PR OFFICE/OUTPT VISIT, EST, LEVL IV, 30-39 MIN: ICD-10-PCS | Mod: S$GLB,,, | Performed by: INTERNAL MEDICINE

## 2021-05-03 PROCEDURE — 1126F AMNT PAIN NOTED NONE PRSNT: CPT | Mod: S$GLB,,, | Performed by: INTERNAL MEDICINE

## 2021-05-03 RX ORDER — HYDROXYCHLOROQUINE SULFATE 200 MG/1
200 TABLET, FILM COATED ORAL DAILY
Qty: 90 TABLET | Refills: 1 | Status: SHIPPED | OUTPATIENT
Start: 2021-05-03 | End: 2021-11-23 | Stop reason: SDUPTHER

## 2021-05-03 RX ORDER — METHOTREXATE 2.5 MG/1
15 TABLET ORAL
Qty: 84 TABLET | Refills: 1 | Status: SHIPPED | OUTPATIENT
Start: 2021-05-03 | End: 2021-11-23 | Stop reason: SDUPTHER

## 2021-05-03 RX ORDER — SULFASALAZINE 500 MG/1
500 TABLET ORAL 2 TIMES DAILY
Qty: 180 TABLET | Refills: 1 | Status: SHIPPED | OUTPATIENT
Start: 2021-05-03 | End: 2021-11-23 | Stop reason: SDUPTHER

## 2021-05-03 ASSESSMENT — CLINICAL DISEASE ACTIVITY INDEX (CDAI)
TENDER_JOINTS_COUNT: 0
PHYSICIAN_ASSESSMENT: 1
PATIENT_ASSESSMENT: 1
TOTAL_SCORE: 2
SWOLLEN_JOINTS_COUNT: 0

## 2021-05-06 ENCOUNTER — TELEPHONE (OUTPATIENT)
Dept: FAMILY MEDICINE | Facility: CLINIC | Age: 66
End: 2021-05-06

## 2021-05-13 ENCOUNTER — PATIENT OUTREACH (OUTPATIENT)
Dept: ADMINISTRATIVE | Facility: OTHER | Age: 66
End: 2021-05-13

## 2021-05-14 ENCOUNTER — OFFICE VISIT (OUTPATIENT)
Dept: DERMATOLOGY | Facility: CLINIC | Age: 66
End: 2021-05-14
Payer: COMMERCIAL

## 2021-05-14 VITALS — BODY MASS INDEX: 26.46 KG/M2 | WEIGHT: 147 LBS

## 2021-05-14 DIAGNOSIS — Z12.83 SKIN EXAM, SCREENING FOR CANCER: ICD-10-CM

## 2021-05-14 DIAGNOSIS — L57.0 ACTINIC KERATOSIS: Primary | ICD-10-CM

## 2021-05-14 DIAGNOSIS — L81.4 LENTIGINES: ICD-10-CM

## 2021-05-14 PROCEDURE — 99999 PR PBB SHADOW E&M-EST. PATIENT-LVL III: ICD-10-PCS | Mod: PBBFAC,,, | Performed by: DERMATOLOGY

## 2021-05-14 PROCEDURE — 3008F PR BODY MASS INDEX (BMI) DOCUMENTED: ICD-10-PCS | Mod: CPTII,S$GLB,, | Performed by: DERMATOLOGY

## 2021-05-14 PROCEDURE — 99202 OFFICE O/P NEW SF 15 MIN: CPT | Mod: 25,S$GLB,, | Performed by: DERMATOLOGY

## 2021-05-14 PROCEDURE — 17000 DESTRUCT PREMALG LESION: CPT | Mod: S$GLB,,, | Performed by: DERMATOLOGY

## 2021-05-14 PROCEDURE — 99202 PR OFFICE/OUTPT VISIT, NEW, LEVL II, 15-29 MIN: ICD-10-PCS | Mod: 25,S$GLB,, | Performed by: DERMATOLOGY

## 2021-05-14 PROCEDURE — 17000 PR DESTRUCTION(LASER SURGERY,CRYOSURGERY,CHEMOSURGERY),PREMALIGNANT LESIONS,FIRST LESION: ICD-10-PCS | Mod: S$GLB,,, | Performed by: DERMATOLOGY

## 2021-05-14 PROCEDURE — 3008F BODY MASS INDEX DOCD: CPT | Mod: CPTII,S$GLB,, | Performed by: DERMATOLOGY

## 2021-05-14 PROCEDURE — 1126F PR PAIN SEVERITY QUANTIFIED, NO PAIN PRESENT: ICD-10-PCS | Mod: S$GLB,,, | Performed by: DERMATOLOGY

## 2021-05-14 PROCEDURE — 1126F AMNT PAIN NOTED NONE PRSNT: CPT | Mod: S$GLB,,, | Performed by: DERMATOLOGY

## 2021-05-14 PROCEDURE — 99999 PR PBB SHADOW E&M-EST. PATIENT-LVL III: CPT | Mod: PBBFAC,,, | Performed by: DERMATOLOGY

## 2021-07-06 ENCOUNTER — PATIENT MESSAGE (OUTPATIENT)
Dept: ADMINISTRATIVE | Facility: HOSPITAL | Age: 66
End: 2021-07-06

## 2021-10-04 ENCOUNTER — PATIENT MESSAGE (OUTPATIENT)
Dept: ADMINISTRATIVE | Facility: HOSPITAL | Age: 66
End: 2021-10-04

## 2021-10-20 DIAGNOSIS — E78.5 HYPERLIPIDEMIA, UNSPECIFIED HYPERLIPIDEMIA TYPE: ICD-10-CM

## 2021-10-20 DIAGNOSIS — K21.9 GASTROESOPHAGEAL REFLUX DISEASE, UNSPECIFIED WHETHER ESOPHAGITIS PRESENT: ICD-10-CM

## 2021-10-21 RX ORDER — ATORVASTATIN CALCIUM 20 MG/1
20 TABLET, FILM COATED ORAL DAILY
Qty: 90 TABLET | Refills: 1 | Status: SHIPPED | OUTPATIENT
Start: 2021-10-21 | End: 2021-10-26 | Stop reason: SDUPTHER

## 2021-10-21 RX ORDER — PANTOPRAZOLE SODIUM 40 MG/1
40 TABLET, DELAYED RELEASE ORAL DAILY
Qty: 90 TABLET | Refills: 1 | Status: SHIPPED | OUTPATIENT
Start: 2021-10-21 | End: 2021-10-26 | Stop reason: SDUPTHER

## 2021-10-26 DIAGNOSIS — K21.9 GASTROESOPHAGEAL REFLUX DISEASE, UNSPECIFIED WHETHER ESOPHAGITIS PRESENT: ICD-10-CM

## 2021-10-26 DIAGNOSIS — E78.5 HYPERLIPIDEMIA, UNSPECIFIED HYPERLIPIDEMIA TYPE: ICD-10-CM

## 2021-10-26 RX ORDER — PANTOPRAZOLE SODIUM 40 MG/1
40 TABLET, DELAYED RELEASE ORAL DAILY
Qty: 90 TABLET | Refills: 1 | Status: SHIPPED | OUTPATIENT
Start: 2021-10-26 | End: 2022-08-26 | Stop reason: SDUPTHER

## 2021-10-26 RX ORDER — ATORVASTATIN CALCIUM 20 MG/1
20 TABLET, FILM COATED ORAL DAILY
Qty: 90 TABLET | Refills: 1 | Status: SHIPPED | OUTPATIENT
Start: 2021-10-26 | End: 2022-05-24 | Stop reason: SDUPTHER

## 2021-10-28 DIAGNOSIS — Z12.11 COLON CANCER SCREENING: ICD-10-CM

## 2021-11-05 DIAGNOSIS — Z12.31 OTHER SCREENING MAMMOGRAM: ICD-10-CM

## 2021-11-23 ENCOUNTER — LAB VISIT (OUTPATIENT)
Dept: LAB | Facility: HOSPITAL | Age: 66
End: 2021-11-23
Attending: INTERNAL MEDICINE
Payer: COMMERCIAL

## 2021-11-23 ENCOUNTER — OFFICE VISIT (OUTPATIENT)
Dept: RHEUMATOLOGY | Facility: CLINIC | Age: 66
End: 2021-11-23
Payer: COMMERCIAL

## 2021-11-23 VITALS
OXYGEN SATURATION: 97 % | SYSTOLIC BLOOD PRESSURE: 164 MMHG | TEMPERATURE: 99 F | DIASTOLIC BLOOD PRESSURE: 87 MMHG | RESPIRATION RATE: 18 BRPM | HEART RATE: 86 BPM

## 2021-11-23 DIAGNOSIS — Z79.899 ENCOUNTER FOR LONG-TERM (CURRENT) USE OF OTHER MEDICATIONS: ICD-10-CM

## 2021-11-23 DIAGNOSIS — Z71.89 COUNSELING AND COORDINATION OF CARE: ICD-10-CM

## 2021-11-23 DIAGNOSIS — M06.9 RHEUMATOID ARTHRITIS INVOLVING BOTH HANDS, UNSPECIFIED WHETHER RHEUMATOID FACTOR PRESENT: ICD-10-CM

## 2021-11-23 DIAGNOSIS — M15.9 PRIMARY OSTEOARTHRITIS INVOLVING MULTIPLE JOINTS: ICD-10-CM

## 2021-11-23 DIAGNOSIS — M06.9 RHEUMATOID ARTHRITIS INVOLVING BOTH HANDS, UNSPECIFIED WHETHER RHEUMATOID FACTOR PRESENT: Primary | ICD-10-CM

## 2021-11-23 LAB
ALBUMIN SERPL BCP-MCNC: 3.7 G/DL (ref 3.5–5.2)
ALP SERPL-CCNC: 51 U/L (ref 55–135)
ALT SERPL W/O P-5'-P-CCNC: 21 U/L (ref 10–44)
ANION GAP SERPL CALC-SCNC: 5 MMOL/L (ref 8–16)
AST SERPL-CCNC: 29 U/L (ref 10–40)
BASOPHILS # BLD AUTO: 0.04 K/UL (ref 0–0.2)
BASOPHILS NFR BLD: 0.7 % (ref 0–1.9)
BILIRUB SERPL-MCNC: 0.5 MG/DL (ref 0.1–1)
BUN SERPL-MCNC: 17 MG/DL (ref 8–23)
CALCIUM SERPL-MCNC: 9.8 MG/DL (ref 8.7–10.5)
CHLORIDE SERPL-SCNC: 108 MMOL/L (ref 95–110)
CO2 SERPL-SCNC: 28 MMOL/L (ref 23–29)
CREAT SERPL-MCNC: 0.8 MG/DL (ref 0.5–1.4)
DIFFERENTIAL METHOD: ABNORMAL
EOSINOPHIL # BLD AUTO: 0.1 K/UL (ref 0–0.5)
EOSINOPHIL NFR BLD: 1.4 % (ref 0–8)
ERYTHROCYTE [DISTWIDTH] IN BLOOD BY AUTOMATED COUNT: 13 % (ref 11.5–14.5)
EST. GFR  (AFRICAN AMERICAN): >60 ML/MIN/1.73 M^2
EST. GFR  (NON AFRICAN AMERICAN): >60 ML/MIN/1.73 M^2
GLUCOSE SERPL-MCNC: 81 MG/DL (ref 70–110)
HCT VFR BLD AUTO: 37 % (ref 37–48.5)
HGB BLD-MCNC: 11.8 G/DL (ref 12–16)
IMM GRANULOCYTES # BLD AUTO: 0.02 K/UL (ref 0–0.04)
IMM GRANULOCYTES NFR BLD AUTO: 0.4 % (ref 0–0.5)
LYMPHOCYTES # BLD AUTO: 2.3 K/UL (ref 1–4.8)
LYMPHOCYTES NFR BLD: 41.8 % (ref 18–48)
MCH RBC QN AUTO: 32.7 PG (ref 27–31)
MCHC RBC AUTO-ENTMCNC: 31.9 G/DL (ref 32–36)
MCV RBC AUTO: 103 FL (ref 82–98)
MONOCYTES # BLD AUTO: 0.6 K/UL (ref 0.3–1)
MONOCYTES NFR BLD: 9.9 % (ref 4–15)
NEUTROPHILS # BLD AUTO: 2.5 K/UL (ref 1.8–7.7)
NEUTROPHILS NFR BLD: 45.8 % (ref 38–73)
NRBC BLD-RTO: 0 /100 WBC
PLATELET # BLD AUTO: 296 K/UL (ref 150–450)
PMV BLD AUTO: 9.3 FL (ref 9.2–12.9)
POTASSIUM SERPL-SCNC: 4.7 MMOL/L (ref 3.5–5.1)
PROT SERPL-MCNC: 7.4 G/DL (ref 6–8.4)
RBC # BLD AUTO: 3.61 M/UL (ref 4–5.4)
SODIUM SERPL-SCNC: 141 MMOL/L (ref 136–145)
WBC # BLD AUTO: 5.55 K/UL (ref 3.9–12.7)

## 2021-11-23 PROCEDURE — 36415 COLL VENOUS BLD VENIPUNCTURE: CPT | Mod: PN | Performed by: INTERNAL MEDICINE

## 2021-11-23 PROCEDURE — 99999 PR PBB SHADOW E&M-EST. PATIENT-LVL IV: CPT | Mod: PBBFAC,,, | Performed by: INTERNAL MEDICINE

## 2021-11-23 PROCEDURE — 99214 OFFICE O/P EST MOD 30 MIN: CPT | Mod: S$GLB,,, | Performed by: INTERNAL MEDICINE

## 2021-11-23 PROCEDURE — 99999 PR PBB SHADOW E&M-EST. PATIENT-LVL IV: ICD-10-PCS | Mod: PBBFAC,,, | Performed by: INTERNAL MEDICINE

## 2021-11-23 PROCEDURE — 80053 COMPREHEN METABOLIC PANEL: CPT | Performed by: INTERNAL MEDICINE

## 2021-11-23 PROCEDURE — 99214 PR OFFICE/OUTPT VISIT, EST, LEVL IV, 30-39 MIN: ICD-10-PCS | Mod: S$GLB,,, | Performed by: INTERNAL MEDICINE

## 2021-11-23 PROCEDURE — 85025 COMPLETE CBC W/AUTO DIFF WBC: CPT | Performed by: INTERNAL MEDICINE

## 2021-11-23 RX ORDER — FOLIC ACID 1 MG/1
1 TABLET ORAL DAILY
Qty: 90 TABLET | Refills: 1 | Status: SHIPPED | OUTPATIENT
Start: 2021-11-23 | End: 2022-05-24 | Stop reason: SDUPTHER

## 2021-11-23 RX ORDER — SULFASALAZINE 500 MG/1
500 TABLET ORAL 2 TIMES DAILY
Qty: 180 TABLET | Refills: 1 | Status: SHIPPED | OUTPATIENT
Start: 2021-11-23 | End: 2022-07-19 | Stop reason: SDUPTHER

## 2021-11-23 RX ORDER — HYDROXYCHLOROQUINE SULFATE 200 MG/1
200 TABLET, FILM COATED ORAL DAILY
Qty: 90 TABLET | Refills: 1 | Status: SHIPPED | OUTPATIENT
Start: 2021-11-23 | End: 2022-07-11 | Stop reason: SDUPTHER

## 2021-11-23 RX ORDER — METHOTREXATE 2.5 MG/1
15 TABLET ORAL
Qty: 84 TABLET | Refills: 1 | Status: SHIPPED | OUTPATIENT
Start: 2021-11-23 | End: 2022-09-28 | Stop reason: SDUPTHER

## 2021-12-02 ENCOUNTER — TELEPHONE (OUTPATIENT)
Dept: RHEUMATOLOGY | Facility: CLINIC | Age: 66
End: 2021-12-02
Payer: COMMERCIAL

## 2021-12-08 ENCOUNTER — PATIENT MESSAGE (OUTPATIENT)
Dept: ADMINISTRATIVE | Facility: HOSPITAL | Age: 66
End: 2021-12-08
Payer: COMMERCIAL

## 2022-04-25 ENCOUNTER — OFFICE VISIT (OUTPATIENT)
Dept: FAMILY MEDICINE | Facility: CLINIC | Age: 67
End: 2022-04-25
Payer: MEDICARE

## 2022-04-25 VITALS
DIASTOLIC BLOOD PRESSURE: 80 MMHG | BODY MASS INDEX: 26.78 KG/M2 | SYSTOLIC BLOOD PRESSURE: 142 MMHG | WEIGHT: 148.81 LBS | HEART RATE: 75 BPM | RESPIRATION RATE: 17 BRPM | OXYGEN SATURATION: 97 %

## 2022-04-25 DIAGNOSIS — M06.9 RHEUMATOID ARTHRITIS INVOLVING BOTH HANDS, UNSPECIFIED WHETHER RHEUMATOID FACTOR PRESENT: ICD-10-CM

## 2022-04-25 DIAGNOSIS — Z13.6 ENCOUNTER FOR LIPID SCREENING FOR CARDIOVASCULAR DISEASE: ICD-10-CM

## 2022-04-25 DIAGNOSIS — Z00.00 ANNUAL PHYSICAL EXAM: ICD-10-CM

## 2022-04-25 DIAGNOSIS — Z12.31 ENCOUNTER FOR SCREENING MAMMOGRAM FOR MALIGNANT NEOPLASM OF BREAST: ICD-10-CM

## 2022-04-25 DIAGNOSIS — Z78.0 ASYMPTOMATIC POSTMENOPAUSAL STATE: ICD-10-CM

## 2022-04-25 DIAGNOSIS — Z23 NEED FOR PNEUMOCOCCAL VACCINATION: ICD-10-CM

## 2022-04-25 DIAGNOSIS — R22.1 NECK MASS: ICD-10-CM

## 2022-04-25 DIAGNOSIS — I10 ESSENTIAL HYPERTENSION: ICD-10-CM

## 2022-04-25 DIAGNOSIS — Z12.11 COLON CANCER SCREENING: Primary | ICD-10-CM

## 2022-04-25 DIAGNOSIS — Z13.220 ENCOUNTER FOR LIPID SCREENING FOR CARDIOVASCULAR DISEASE: ICD-10-CM

## 2022-04-25 DIAGNOSIS — Z12.39 ENCOUNTER FOR SCREENING FOR MALIGNANT NEOPLASM OF BREAST, UNSPECIFIED SCREENING MODALITY: ICD-10-CM

## 2022-04-25 PROCEDURE — 99999 PR PBB SHADOW E&M-EST. PATIENT-LVL IV: CPT | Mod: PBBFAC,,, | Performed by: INTERNAL MEDICINE

## 2022-04-25 PROCEDURE — 4010F PR ACE/ARB THEARPY RXD/TAKEN: ICD-10-PCS | Mod: CPTII,S$GLB,, | Performed by: INTERNAL MEDICINE

## 2022-04-25 PROCEDURE — 3079F PR MOST RECENT DIASTOLIC BLOOD PRESSURE 80-89 MM HG: ICD-10-PCS | Mod: CPTII,S$GLB,, | Performed by: INTERNAL MEDICINE

## 2022-04-25 PROCEDURE — 3077F SYST BP >= 140 MM HG: CPT | Mod: CPTII,S$GLB,, | Performed by: INTERNAL MEDICINE

## 2022-04-25 PROCEDURE — 4010F ACE/ARB THERAPY RXD/TAKEN: CPT | Mod: CPTII,S$GLB,, | Performed by: INTERNAL MEDICINE

## 2022-04-25 PROCEDURE — 3079F DIAST BP 80-89 MM HG: CPT | Mod: CPTII,S$GLB,, | Performed by: INTERNAL MEDICINE

## 2022-04-25 PROCEDURE — 1160F PR REVIEW ALL MEDS BY PRESCRIBER/CLIN PHARMACIST DOCUMENTED: ICD-10-PCS | Mod: CPTII,S$GLB,, | Performed by: INTERNAL MEDICINE

## 2022-04-25 PROCEDURE — 99999 PR PBB SHADOW E&M-EST. PATIENT-LVL IV: ICD-10-PCS | Mod: PBBFAC,,, | Performed by: INTERNAL MEDICINE

## 2022-04-25 PROCEDURE — 3008F PR BODY MASS INDEX (BMI) DOCUMENTED: ICD-10-PCS | Mod: CPTII,S$GLB,, | Performed by: INTERNAL MEDICINE

## 2022-04-25 PROCEDURE — 1160F RVW MEDS BY RX/DR IN RCRD: CPT | Mod: CPTII,S$GLB,, | Performed by: INTERNAL MEDICINE

## 2022-04-25 PROCEDURE — 1159F MED LIST DOCD IN RCRD: CPT | Mod: CPTII,S$GLB,, | Performed by: INTERNAL MEDICINE

## 2022-04-25 PROCEDURE — 3077F PR MOST RECENT SYSTOLIC BLOOD PRESSURE >= 140 MM HG: ICD-10-PCS | Mod: CPTII,S$GLB,, | Performed by: INTERNAL MEDICINE

## 2022-04-25 PROCEDURE — 99214 PR OFFICE/OUTPT VISIT, EST, LEVL IV, 30-39 MIN: ICD-10-PCS | Mod: S$GLB,,, | Performed by: INTERNAL MEDICINE

## 2022-04-25 PROCEDURE — 99214 OFFICE O/P EST MOD 30 MIN: CPT | Mod: S$GLB,,, | Performed by: INTERNAL MEDICINE

## 2022-04-25 PROCEDURE — 1159F PR MEDICATION LIST DOCUMENTED IN MEDICAL RECORD: ICD-10-PCS | Mod: CPTII,S$GLB,, | Performed by: INTERNAL MEDICINE

## 2022-04-25 PROCEDURE — 3008F BODY MASS INDEX DOCD: CPT | Mod: CPTII,S$GLB,, | Performed by: INTERNAL MEDICINE

## 2022-04-25 RX ORDER — LOSARTAN POTASSIUM 25 MG/1
25 TABLET ORAL DAILY
Qty: 30 TABLET | Refills: 5 | Status: SHIPPED | OUTPATIENT
Start: 2022-04-25 | End: 2022-06-10 | Stop reason: SDUPTHER

## 2022-04-25 NOTE — PROGRESS NOTES
HISTORY OF PRESENT ILLNESS:  Lula De Leon is a 66 y.o. female who presents to the clinic today for No chief complaint on file.    Last seen by me 4/2021.    Rheumatoid arthritis  Followed by Dr. Hooks with next visit in May.  Managed with sulfasalazine, MTX, Plaquenil.  She is on folic acid.  Eye Exam almost a year ago with Dr. Rodrigez.  Notes symptoms primarily in bilateral MCP and PIP joints as well as wrists.    Hyperlipidemia  Managed with statin.    BP at home normal to high but does not have log with her.    PAST MEDICAL HISTORY:  Past Medical History:   Diagnosis Date    Rheumatoid arthritis        PAST SURGICAL HISTORY:  Past Surgical History:   Procedure Laterality Date    BREAST LUMPECTOMY Right     exc biopsy    BREAST SURGERY      [facility]    CARPAL TUNNEL RELEASE Right     SINUS SURGERY  2017    Dr. Christel FLORES and Dr. Chilel       SOCIAL HISTORY:  Social History     Socioeconomic History    Marital status:    Occupational History    Occupation: retired - used to make prosthesis and dental implants   Tobacco Use    Smoking status: Never Smoker   Substance and Sexual Activity    Alcohol use: Yes       FAMILY HISTORY:  Family History   Problem Relation Age of Onset    Arthritis Mother     Aneurysm Mother 64    Heart attack Father 70    Epilepsy Sister        ALLERGIES AND MEDICATIONS: updated and reviewed.  Review of patient's allergies indicates:  No Known Allergies  Medication List with Changes/Refills   Current Medications    ASCORBIC ACID, VITAMIN C, (VITAMIN C) 500 MG TABLET    Take 500 mg by mouth.    ATORVASTATIN (LIPITOR) 20 MG TABLET    Take 1 tablet (20 mg total) by mouth once daily.    BETA CAROTENE 28623 UNIT CAPSULE    Take 25,000 Units by mouth.    CALCIUM CARBONATE (OS-ONEIDA) 600 MG CALCIUM (1,500 MG) TAB    Take 600 mg by mouth.    CETIRIZINE (ZYRTEC) 10 MG TABLET    Take 10 mg by mouth.    CYANOCOBALAMIN (VITAMIN B-12) 1000 MCG TABLET     Take 1,000 mcg by mouth.    DICLOFENAC SODIUM (VOLTAREN) 1 % GEL    Apply 2 g topically 4 (four) times daily.    FAWAD PRIM-LINOLEIC-GAMOLENIC AC (PRIMROSE OIL) 1,000 MG CAP    Take by mouth.    FLAXSEED OIL OIL    800 mg by Other route.    FOLIC ACID (FOLVITE) 1 MG TABLET    Take 1 tablet (1 mg total) by mouth once daily.    GARLIC 1,000 MG CAP    Take by mouth.    HYDROXYCHLOROQUINE (PLAQUENIL) 200 MG TABLET    Take 1 tablet (200 mg total) by mouth once daily.    IRON BISGLY,PS-FA-B-C#12-SUCC 65 MG-65 MG -1,000 MCG (24) TAB    Take by mouth.    MAGNESIUM CARBONATE ORAL    Take 600 mg by mouth.    METHOTREXATE 2.5 MG TAB    Take 6 tablets (15 mg total) by mouth every 7 days.    OMEGA-3 FATTY ACIDS/FISH OIL (FISH OIL-OMEGA-3 FATTY ACIDS) 300-1,000 MG CAPSULE    Take 2 g by mouth.    PANTOPRAZOLE (PROTONIX) 40 MG TABLET    Take 1 tablet (40 mg total) by mouth once daily.    SULFASALAZINE (AZULFIDINE) 500 MG TAB    Take 1 tablet (500 mg total) by mouth 2 (two) times a day.    VITAMIN E 400 UNIT CAPSULE    Take 400 Units by mouth.    ZINC GLUCONATE 50 MG TABLET    Take 50 mg by mouth.          CARE TEAM:  Patient Care Team:  Sylvain Haywood MD as PCP - General (Internal Medicine)  Ramesh Acuña MA as Care Coordinator  Eliel Rodriegz MD as Consulting Physician (Ophthalmology)  [facility]         REVIEW OF SYSTEMS:  Review of Systems   Constitutional: Negative for chills and fever.   HENT: Negative for congestion and postnasal drip.    Eyes: Negative for photophobia and visual disturbance.   Respiratory: Negative for cough and shortness of breath.    Cardiovascular: Negative for chest pain and palpitations.   Gastrointestinal: Negative for nausea and vomiting.   Genitourinary: Negative for dysuria and frequency.   Musculoskeletal: Positive for arthralgias and joint swelling. Negative for back pain and gait problem.   Neurological: Negative for light-headedness and  headaches.   Psychiatric/Behavioral: Negative for dysphoric mood and sleep disturbance. The patient is not nervous/anxious.          PHYSICAL EXAM:   Vitals:    04/25/22 0900   BP: (!) 142/84   Pulse: 75   Resp: 17             Body mass index is 26.78 kg/m².     General appearance - alert, well appearing, and in no distress and oriented to person, place, and time  Mental status - normal mood, behavior, speech, dress, motor activity, and thought processes  Neck - prominent carotid pulsation noted without bruit  Eyes - sclera anicteric, left eye normal, right eye normal  Ears - bilateral TM's and external ear canals normal  Chest - clear to auscultation, no wheezes, rales or rhonchi, symmetric air entry  Heart - normal rate and regular rhythm, no murmurs noted  Neurological - alert, oriented, normal speech, no focal findings or movement disorder noted, motor and sensory grossly normal bilaterally  Extremities - peripheral pulses normal, no pedal edema, no clubbing or cyanosis      ASSESSMENT AND PLAN:  Colon cancer screening  -     Cologuard Screening (Multitarget Stool DNA); Future; Expected date: 04/25/2022    Asymptomatic postmenopausal state  -     DXA Bone Density Spine And Hip; Future; Expected date: 04/25/2022    Encounter for screening for malignant neoplasm of breast, unspecified screening modality  -     Mammo Digital Screening Bilat; Future; Expected date: 04/25/2022    Need for pneumococcal vaccination    Annual physical exam  -     Cologuard Screening (Multitarget Stool DNA); Future; Expected date: 04/25/2022  -     DXA Bone Density Spine And Hip; Future; Expected date: 04/25/2022  -     Mammo Digital Screening Bilat; Future; Expected date: 04/25/2022  -     Hemoglobin A1C; Future; Expected date: 04/25/2022  -     Comprehensive Metabolic Panel; Future; Expected date: 04/25/2022  -     Lipid Panel; Future; Expected date: 04/25/2022  -     CBC Auto Differential; Future; Expected date: 04/25/2022  -     TSH;  Future; Expected date: 04/25/2022    Rheumatoid arthritis involving both hands, unspecified whether rheumatoid factor present    Essential hypertension  -     losartan (COZAAR) 25 MG tablet; Take 1 tablet (25 mg total) by mouth once daily.  Dispense: 30 tablet; Refill: 5    Encounter for screening mammogram for malignant neoplasm of breast   -     Mammo Digital Screening Bilat; Future; Expected date: 04/25/2022    Encounter for lipid screening for cardiovascular disease   -     Lipid Panel; Future; Expected date: 04/25/2022    Neck mass  -     US Soft Tissue Head Neck Thyroid; Future; Expected date: 04/25/2022  -     US Carotid Bilateral; Future; Expected date: 04/25/2022             No follow-ups on file. or sooner as needed.

## 2022-05-03 ENCOUNTER — HOSPITAL ENCOUNTER (OUTPATIENT)
Dept: RADIOLOGY | Facility: HOSPITAL | Age: 67
Discharge: HOME | End: 2022-05-03
Attending: INTERNAL MEDICINE
Payer: MEDICARE

## 2022-05-03 VITALS — BODY MASS INDEX: 26.37 KG/M2 | HEIGHT: 63 IN | WEIGHT: 148.81 LBS

## 2022-05-03 DIAGNOSIS — R22.1 NECK MASS: ICD-10-CM

## 2022-05-03 DIAGNOSIS — Z12.31 ENCOUNTER FOR SCREENING MAMMOGRAM FOR MALIGNANT NEOPLASM OF BREAST: ICD-10-CM

## 2022-05-03 DIAGNOSIS — Z12.39 ENCOUNTER FOR SCREENING FOR MALIGNANT NEOPLASM OF BREAST, UNSPECIFIED SCREENING MODALITY: ICD-10-CM

## 2022-05-03 DIAGNOSIS — Z00.00 ANNUAL PHYSICAL EXAM: ICD-10-CM

## 2022-05-03 PROCEDURE — 93880 EXTRACRANIAL BILAT STUDY: CPT | Mod: TC

## 2022-05-03 PROCEDURE — 76536 US EXAM OF HEAD AND NECK: CPT | Mod: 26,,, | Performed by: RADIOLOGY

## 2022-05-03 PROCEDURE — 76536 US SOFT TISSUE HEAD NECK THYROID: ICD-10-PCS | Mod: 26,,, | Performed by: RADIOLOGY

## 2022-05-03 PROCEDURE — 93880 EXTRACRANIAL BILAT STUDY: CPT | Mod: 26,,, | Performed by: RADIOLOGY

## 2022-05-03 PROCEDURE — 77063 BREAST TOMOSYNTHESIS BI: CPT | Mod: 26,,, | Performed by: RADIOLOGY

## 2022-05-03 PROCEDURE — 77067 MAMMO DIGITAL SCREENING BILAT WITH TOMO: ICD-10-PCS | Mod: 26,,, | Performed by: RADIOLOGY

## 2022-05-03 PROCEDURE — 77067 SCR MAMMO BI INCL CAD: CPT | Mod: TC

## 2022-05-03 PROCEDURE — 77067 SCR MAMMO BI INCL CAD: CPT | Mod: 26,,, | Performed by: RADIOLOGY

## 2022-05-03 PROCEDURE — 76536 US EXAM OF HEAD AND NECK: CPT | Mod: TC

## 2022-05-03 PROCEDURE — 93880 US CAROTID BILATERAL: ICD-10-PCS | Mod: 26,,, | Performed by: RADIOLOGY

## 2022-05-03 PROCEDURE — 77063 MAMMO DIGITAL SCREENING BILAT WITH TOMO: ICD-10-PCS | Mod: 26,,, | Performed by: RADIOLOGY

## 2022-05-09 ENCOUNTER — PATIENT MESSAGE (OUTPATIENT)
Dept: FAMILY MEDICINE | Facility: CLINIC | Age: 67
End: 2022-05-09
Payer: MEDICARE

## 2022-05-09 DIAGNOSIS — Q27.39: Primary | ICD-10-CM

## 2022-05-24 ENCOUNTER — OFFICE VISIT (OUTPATIENT)
Dept: RHEUMATOLOGY | Facility: CLINIC | Age: 67
End: 2022-05-24
Payer: MEDICARE

## 2022-05-24 ENCOUNTER — LAB VISIT (OUTPATIENT)
Dept: LAB | Facility: HOSPITAL | Age: 67
End: 2022-05-24
Attending: INTERNAL MEDICINE
Payer: MEDICARE

## 2022-05-24 VITALS
OXYGEN SATURATION: 95 % | DIASTOLIC BLOOD PRESSURE: 90 MMHG | SYSTOLIC BLOOD PRESSURE: 153 MMHG | HEART RATE: 73 BPM | RESPIRATION RATE: 20 BRPM | BODY MASS INDEX: 26.43 KG/M2 | WEIGHT: 146.81 LBS

## 2022-05-24 DIAGNOSIS — M06.9 RHEUMATOID ARTHRITIS INVOLVING BOTH HANDS, UNSPECIFIED WHETHER RHEUMATOID FACTOR PRESENT: Primary | ICD-10-CM

## 2022-05-24 DIAGNOSIS — M06.9 RHEUMATOID ARTHRITIS INVOLVING BOTH HANDS, UNSPECIFIED WHETHER RHEUMATOID FACTOR PRESENT: ICD-10-CM

## 2022-05-24 DIAGNOSIS — Z79.899 ENCOUNTER FOR LONG-TERM (CURRENT) USE OF OTHER MEDICATIONS: ICD-10-CM

## 2022-05-24 DIAGNOSIS — E78.5 HYPERLIPIDEMIA, UNSPECIFIED HYPERLIPIDEMIA TYPE: ICD-10-CM

## 2022-05-24 DIAGNOSIS — Z13.220 ENCOUNTER FOR LIPID SCREENING FOR CARDIOVASCULAR DISEASE: ICD-10-CM

## 2022-05-24 DIAGNOSIS — Z71.89 COUNSELING AND COORDINATION OF CARE: ICD-10-CM

## 2022-05-24 DIAGNOSIS — Z13.6 ENCOUNTER FOR LIPID SCREENING FOR CARDIOVASCULAR DISEASE: ICD-10-CM

## 2022-05-24 DIAGNOSIS — Z00.00 ANNUAL PHYSICAL EXAM: ICD-10-CM

## 2022-05-24 DIAGNOSIS — Z00.00 HEALTHCARE MAINTENANCE: ICD-10-CM

## 2022-05-24 DIAGNOSIS — M15.9 PRIMARY OSTEOARTHRITIS INVOLVING MULTIPLE JOINTS: ICD-10-CM

## 2022-05-24 LAB
ALBUMIN SERPL BCP-MCNC: 3.7 G/DL (ref 3.5–5.2)
ALP SERPL-CCNC: 51 U/L (ref 55–135)
ALT SERPL W/O P-5'-P-CCNC: 37 U/L (ref 10–44)
ANION GAP SERPL CALC-SCNC: 6 MMOL/L (ref 8–16)
AST SERPL-CCNC: 34 U/L (ref 10–40)
BASOPHILS # BLD AUTO: 0.03 K/UL (ref 0–0.2)
BASOPHILS NFR BLD: 0.6 % (ref 0–1.9)
BILIRUB SERPL-MCNC: 0.5 MG/DL (ref 0.1–1)
BUN SERPL-MCNC: 14 MG/DL (ref 8–23)
CALCIUM SERPL-MCNC: 10 MG/DL (ref 8.7–10.5)
CHLORIDE SERPL-SCNC: 105 MMOL/L (ref 95–110)
CHOLEST SERPL-MCNC: 156 MG/DL (ref 120–199)
CHOLEST SERPL-MCNC: 156 MG/DL (ref 120–199)
CHOLEST/HDLC SERPL: 2.8 {RATIO} (ref 2–5)
CHOLEST/HDLC SERPL: 2.8 {RATIO} (ref 2–5)
CO2 SERPL-SCNC: 28 MMOL/L (ref 23–29)
CREAT SERPL-MCNC: 0.8 MG/DL (ref 0.5–1.4)
DIFFERENTIAL METHOD: ABNORMAL
EOSINOPHIL # BLD AUTO: 0.1 K/UL (ref 0–0.5)
EOSINOPHIL NFR BLD: 1.8 % (ref 0–8)
ERYTHROCYTE [DISTWIDTH] IN BLOOD BY AUTOMATED COUNT: 13.8 % (ref 11.5–14.5)
EST. GFR  (AFRICAN AMERICAN): >60 ML/MIN/1.73 M^2
EST. GFR  (NON AFRICAN AMERICAN): >60 ML/MIN/1.73 M^2
ESTIMATED AVG GLUCOSE: 91 MG/DL (ref 68–131)
GLUCOSE SERPL-MCNC: 101 MG/DL (ref 70–110)
HBA1C MFR BLD: 4.8 % (ref 4–5.6)
HCT VFR BLD AUTO: 37.7 % (ref 37–48.5)
HDLC SERPL-MCNC: 55 MG/DL (ref 40–75)
HDLC SERPL-MCNC: 55 MG/DL (ref 40–75)
HDLC SERPL: 35.3 % (ref 20–50)
HDLC SERPL: 35.3 % (ref 20–50)
HGB BLD-MCNC: 11.7 G/DL (ref 12–16)
IMM GRANULOCYTES # BLD AUTO: 0.01 K/UL (ref 0–0.04)
IMM GRANULOCYTES NFR BLD AUTO: 0.2 % (ref 0–0.5)
LDLC SERPL CALC-MCNC: 87.4 MG/DL (ref 63–159)
LDLC SERPL CALC-MCNC: 87.4 MG/DL (ref 63–159)
LYMPHOCYTES # BLD AUTO: 2.1 K/UL (ref 1–4.8)
LYMPHOCYTES NFR BLD: 42.1 % (ref 18–48)
MCH RBC QN AUTO: 30.9 PG (ref 27–31)
MCHC RBC AUTO-ENTMCNC: 31 G/DL (ref 32–36)
MCV RBC AUTO: 100 FL (ref 82–98)
MONOCYTES # BLD AUTO: 0.5 K/UL (ref 0.3–1)
MONOCYTES NFR BLD: 10.2 % (ref 4–15)
NEUTROPHILS # BLD AUTO: 2.3 K/UL (ref 1.8–7.7)
NEUTROPHILS NFR BLD: 45.1 % (ref 38–73)
NONHDLC SERPL-MCNC: 101 MG/DL
NONHDLC SERPL-MCNC: 101 MG/DL
NRBC BLD-RTO: 0 /100 WBC
PLATELET # BLD AUTO: 349 K/UL (ref 150–450)
PMV BLD AUTO: 9.4 FL (ref 9.2–12.9)
POTASSIUM SERPL-SCNC: 4.2 MMOL/L (ref 3.5–5.1)
PROT SERPL-MCNC: 7.6 G/DL (ref 6–8.4)
RBC # BLD AUTO: 3.79 M/UL (ref 4–5.4)
SODIUM SERPL-SCNC: 139 MMOL/L (ref 136–145)
TRIGL SERPL-MCNC: 68 MG/DL (ref 30–150)
TRIGL SERPL-MCNC: 68 MG/DL (ref 30–150)
TSH SERPL DL<=0.005 MIU/L-ACNC: 2.01 UIU/ML (ref 0.4–4)
WBC # BLD AUTO: 4.99 K/UL (ref 3.9–12.7)

## 2022-05-24 PROCEDURE — 36415 COLL VENOUS BLD VENIPUNCTURE: CPT | Mod: PN | Performed by: INTERNAL MEDICINE

## 2022-05-24 PROCEDURE — 3008F BODY MASS INDEX DOCD: CPT | Mod: CPTII,S$GLB,, | Performed by: INTERNAL MEDICINE

## 2022-05-24 PROCEDURE — 3288F FALL RISK ASSESSMENT DOCD: CPT | Mod: CPTII,S$GLB,, | Performed by: INTERNAL MEDICINE

## 2022-05-24 PROCEDURE — 1101F PT FALLS ASSESS-DOCD LE1/YR: CPT | Mod: CPTII,S$GLB,, | Performed by: INTERNAL MEDICINE

## 2022-05-24 PROCEDURE — 3077F PR MOST RECENT SYSTOLIC BLOOD PRESSURE >= 140 MM HG: ICD-10-PCS | Mod: CPTII,S$GLB,, | Performed by: INTERNAL MEDICINE

## 2022-05-24 PROCEDURE — 4010F PR ACE/ARB THEARPY RXD/TAKEN: ICD-10-PCS | Mod: CPTII,S$GLB,, | Performed by: INTERNAL MEDICINE

## 2022-05-24 PROCEDURE — 80053 COMPREHEN METABOLIC PANEL: CPT | Performed by: INTERNAL MEDICINE

## 2022-05-24 PROCEDURE — 4010F ACE/ARB THERAPY RXD/TAKEN: CPT | Mod: CPTII,S$GLB,, | Performed by: INTERNAL MEDICINE

## 2022-05-24 PROCEDURE — 80061 LIPID PANEL: CPT | Performed by: INTERNAL MEDICINE

## 2022-05-24 PROCEDURE — 87340 HEPATITIS B SURFACE AG IA: CPT | Performed by: INTERNAL MEDICINE

## 2022-05-24 PROCEDURE — 87389 HIV-1 AG W/HIV-1&-2 AB AG IA: CPT | Performed by: INTERNAL MEDICINE

## 2022-05-24 PROCEDURE — 84443 ASSAY THYROID STIM HORMONE: CPT | Mod: GA | Performed by: INTERNAL MEDICINE

## 2022-05-24 PROCEDURE — 3008F PR BODY MASS INDEX (BMI) DOCUMENTED: ICD-10-PCS | Mod: CPTII,S$GLB,, | Performed by: INTERNAL MEDICINE

## 2022-05-24 PROCEDURE — 3080F PR MOST RECENT DIASTOLIC BLOOD PRESSURE >= 90 MM HG: ICD-10-PCS | Mod: CPTII,S$GLB,, | Performed by: INTERNAL MEDICINE

## 2022-05-24 PROCEDURE — 1125F AMNT PAIN NOTED PAIN PRSNT: CPT | Mod: CPTII,S$GLB,, | Performed by: INTERNAL MEDICINE

## 2022-05-24 PROCEDURE — 85025 COMPLETE CBC W/AUTO DIFF WBC: CPT | Mod: GA | Performed by: INTERNAL MEDICINE

## 2022-05-24 PROCEDURE — 99999 PR PBB SHADOW E&M-EST. PATIENT-LVL III: ICD-10-PCS | Mod: PBBFAC,,, | Performed by: INTERNAL MEDICINE

## 2022-05-24 PROCEDURE — 83036 HEMOGLOBIN GLYCOSYLATED A1C: CPT | Mod: GA | Performed by: INTERNAL MEDICINE

## 2022-05-24 PROCEDURE — 99214 OFFICE O/P EST MOD 30 MIN: CPT | Mod: S$GLB,,, | Performed by: INTERNAL MEDICINE

## 2022-05-24 PROCEDURE — 86706 HEP B SURFACE ANTIBODY: CPT | Performed by: INTERNAL MEDICINE

## 2022-05-24 PROCEDURE — 1125F PR PAIN SEVERITY QUANTIFIED, PAIN PRESENT: ICD-10-PCS | Mod: CPTII,S$GLB,, | Performed by: INTERNAL MEDICINE

## 2022-05-24 PROCEDURE — 3288F PR FALLS RISK ASSESSMENT DOCUMENTED: ICD-10-PCS | Mod: CPTII,S$GLB,, | Performed by: INTERNAL MEDICINE

## 2022-05-24 PROCEDURE — 3080F DIAST BP >= 90 MM HG: CPT | Mod: CPTII,S$GLB,, | Performed by: INTERNAL MEDICINE

## 2022-05-24 PROCEDURE — 86803 HEPATITIS C AB TEST: CPT | Performed by: INTERNAL MEDICINE

## 2022-05-24 PROCEDURE — 1159F PR MEDICATION LIST DOCUMENTED IN MEDICAL RECORD: ICD-10-PCS | Mod: CPTII,S$GLB,, | Performed by: INTERNAL MEDICINE

## 2022-05-24 PROCEDURE — 1101F PR PT FALLS ASSESS DOC 0-1 FALLS W/OUT INJ PAST YR: ICD-10-PCS | Mod: CPTII,S$GLB,, | Performed by: INTERNAL MEDICINE

## 2022-05-24 PROCEDURE — 1159F MED LIST DOCD IN RCRD: CPT | Mod: CPTII,S$GLB,, | Performed by: INTERNAL MEDICINE

## 2022-05-24 PROCEDURE — 99214 PR OFFICE/OUTPT VISIT, EST, LEVL IV, 30-39 MIN: ICD-10-PCS | Mod: S$GLB,,, | Performed by: INTERNAL MEDICINE

## 2022-05-24 PROCEDURE — 99999 PR PBB SHADOW E&M-EST. PATIENT-LVL III: CPT | Mod: PBBFAC,,, | Performed by: INTERNAL MEDICINE

## 2022-05-24 PROCEDURE — 3077F SYST BP >= 140 MM HG: CPT | Mod: CPTII,S$GLB,, | Performed by: INTERNAL MEDICINE

## 2022-05-24 RX ORDER — ATORVASTATIN CALCIUM 20 MG/1
20 TABLET, FILM COATED ORAL DAILY
Qty: 90 TABLET | Refills: 1 | Status: SHIPPED | OUTPATIENT
Start: 2022-05-24 | End: 2022-11-21 | Stop reason: SDUPTHER

## 2022-05-24 RX ORDER — FOLIC ACID 1 MG/1
1 TABLET ORAL DAILY
Qty: 90 TABLET | Refills: 1 | Status: SHIPPED | OUTPATIENT
Start: 2022-05-24 | End: 2022-05-24 | Stop reason: SDUPTHER

## 2022-05-24 NOTE — PROGRESS NOTES
RHEUMATOLOGY OUTPATIENT CLINIC NOTE    5/24/2022    Attending Rheumatologist: Ata Hooks  Primary Care Provider: Sylvain Haywood MD   Physician Requesting Consultation: No referring provider defined for this encounter.  Chief Complaint/Reason For Consultation:  No chief complaint on file.      Subjective:       JOSSY De Leon is a 66 y.o. White female with Hx of Seropositive RA who comes to [facility] care.   Patient diagnosed two years ago. Reports it started in the wrist as swelling and pain, could barely move it. Then was seen by a Rheumatologist which placed her on therapy eventually escalating to Triple Therapy (SSZ, MTX, HCQ) for control. Was on MTX 8 tabs, SSZ 4 and decreased to MTX 4 and SSZ 2 in August due to Renal Function.   Patient reports good response on current therapy. Denies joint swelling, tenderness. Reports about 10-15 of morning stiffness. Reports also lower back and knee pain. Most typically with use. Was prior on NSAIDs but stopped, now on Tylenol with good relief.   FHx: Possible Mom, and Aunt with RA     Today  Patient here for follow up.   Last visit RA management continued. She notes over the last several weeks worsening joint pain, notes +swelling and prolonged morning stiffness. Using NSAIDs/Tylenol with little relief. Tolerating meds.     Review of Systems   Constitutional: Negative for appetite change, chills, fatigue, fever and unexpected weight change.   HENT: Negative for nasal congestion, ear discharge, ear pain, hearing loss, mouth sores, nosebleeds, sneezing, sore throat, tinnitus and trouble swallowing.    Eyes: Negative for photophobia, pain, discharge, redness, itching and visual disturbance.   Respiratory: Negative for cough, chest tightness, shortness of breath and wheezing.    Cardiovascular: Negative for chest pain, palpitations and leg swelling.   Gastrointestinal: Negative for abdominal distention, abdominal pain, blood in stool, constipation, diarrhea,  nausea and vomiting.   Endocrine: Negative for cold intolerance, heat intolerance, polydipsia, polyphagia and polyuria.   Genitourinary: Negative for difficulty urinating, dyspareunia, dysuria, flank pain, frequency, genital sores, hematuria, menstrual problem, pelvic pain, urgency, vaginal bleeding, vaginal discharge, vaginal pain and vaginal dryness.   Musculoskeletal: Positive for arthralgias and joint swelling. Negative for back pain, gait problem, leg pain, myalgias, neck pain, neck stiffness and joint deformity.   Integumentary:  Negative for pallor and rash.   Neurological: Negative for dizziness, seizures, weakness, light-headedness, numbness and headaches.   Hematological: Negative for adenopathy. Does not bruise/bleed easily.   Psychiatric/Behavioral: Negative for confusion, decreased concentration and sleep disturbance. The patient is not nervous/anxious.    All other systems reviewed and are negative.         Chronic comorbid conditions affecting medical decision making today:  Past Medical History:   Diagnosis Date    Rheumatoid arthritis      Past Surgical History:   Procedure Laterality Date    BREAST LUMPECTOMY Right     exc biopsy    BREAST SURGERY      [facility] 2017 [facility]    CARPAL TUNNEL RELEASE Right     SINUS SURGERY  2017    Dr. Christel FLORES and Dr. Chilel     Family History   Problem Relation Age of Onset    Arthritis Mother     Aneurysm Mother 64    Heart attack Father 70    Epilepsy Sister      Social History     Substance and Sexual Activity   Alcohol Use Yes     Social History     Tobacco Use   Smoking Status Never Smoker   Smokeless Tobacco Not on file     Social History     Substance and Sexual Activity   Drug Use Not on file       Current Outpatient Medications:     ascorbic acid, vitamin C, (VITAMIN C) 500 MG tablet, Take 500 mg by mouth., Disp: , Rfl:     atorvastatin (LIPITOR) 20 MG tablet, Take 1 tablet (20 mg total) by mouth once daily., Disp: 90  tablet, Rfl: 1    beta carotene 04674 UNIT capsule, Take 25,000 Units by mouth., Disp: , Rfl:     calcium carbonate (OS-ONEIDA) 600 mg calcium (1,500 mg) Tab, Take 600 mg by mouth., Disp: , Rfl:     cetirizine (ZYRTEC) 10 MG tablet, Take 10 mg by mouth., Disp: , Rfl:     cyanocobalamin (VITAMIN B-12) 1000 MCG tablet, Take 1,000 mcg by mouth., Disp: , Rfl:     diclofenac sodium (VOLTAREN) 1 % Gel, Apply 2 g topically 4 (four) times daily., Disp: 1 Tube, Rfl: 3    richa prim-linoleic-gamolenic ac (PRIMROSE OIL) 1,000 mg Cap, Take by mouth., Disp: , Rfl:     flaxseed oil Oil, 800 mg by Other route., Disp: , Rfl:     folic acid (FOLVITE) 1 MG tablet, Take 1 tablet (1 mg total) by mouth once daily., Disp: 90 tablet, Rfl: 1    hydrOXYchloroQUINE (PLAQUENIL) 200 mg tablet, Take 1 tablet (200 mg total) by mouth once daily., Disp: 90 tablet, Rfl: 1    iron bisgly,ps-FA-B-C#12-succ 65 mg-65 mg -1,000 mcg (24) Tab, Take by mouth., Disp: , Rfl:     losartan (COZAAR) 25 MG tablet, Take 1 tablet (25 mg total) by mouth once daily., Disp: 30 tablet, Rfl: 5    MAGNESIUM CARBONATE ORAL, Take 600 mg by mouth., Disp: , Rfl:     methotrexate 2.5 MG Tab, Take 6 tablets (15 mg total) by mouth every 7 days., Disp: 84 tablet, Rfl: 1    omega-3 fatty acids/fish oil (FISH OIL-OMEGA-3 FATTY ACIDS) 300-1,000 mg capsule, Take 2 g by mouth., Disp: , Rfl:     pantoprazole (PROTONIX) 40 MG tablet, Take 1 tablet (40 mg total) by mouth once daily., Disp: 90 tablet, Rfl: 1    sulfaSALAzine (AZULFIDINE) 500 mg Tab, Take 1 tablet (500 mg total) by mouth 2 (two) times a day., Disp: 180 tablet, Rfl: 1    vitamin E 400 UNIT capsule, Take 400 Units by mouth., Disp: , Rfl:     zinc gluconate 50 mg tablet, Take 50 mg by mouth., Disp: , Rfl:      Objective:         Vitals:    05/24/22 0822   BP: (!) 153/90   Pulse: 73   Resp: 20     Physical Exam   Constitutional: She is oriented to person, place, and time.   HENT:   Head: Normocephalic and  atraumatic.   Right Ear: External ear normal.   Left Ear: External ear normal.   Nose: Nose normal.   Mouth/Throat: Oropharynx is clear and moist.   Eyes: Pupils are equal, round, and reactive to light. Conjunctivae are normal.   Cardiovascular: Normal rate and regular rhythm.   Pulmonary/Chest: Effort normal and breath sounds normal.   Abdominal: Soft. Bowel sounds are normal.   Musculoskeletal:      Right shoulder: Normal.      Left shoulder: Normal.      Right elbow: Normal.      Left elbow: Normal.      Right wrist: Normal.      Left wrist: Normal.      Cervical back: Normal range of motion and neck supple.      Right knee: Normal.      Left knee: Normal.      Comments: Cannot make fist, synovitis MCP/Wrist    Neurological: She is alert and oriented to person, place, and time.   Skin: No rash noted. No erythema.   Psychiatric: Mood and affect normal.       Right Side Rheumatological Exam     Examination finds the shoulder, elbow, wrist, knee, 1st PIP, 1st MCP, 2nd PIP, 2nd MCP, 3rd PIP, 3rd MCP, 4th PIP, 4th MCP, 5th PIP and 5th MCP normal.    Knee Exam   Patellofemoral Crepitus: positive    Left Side Rheumatological Exam     Examination finds the shoulder, elbow, wrist, knee, 1st PIP, 1st MCP, 2nd PIP, 2nd MCP, 3rd PIP, 3rd MCP, 4th PIP, 4th MCP, 5th PIP and 5th MCP normal.    Knee Exam     Patellofemoral Crepitus: positive          Reviewed old and all outside pertinent medical records available.    All lab results personally reviewed and interpreted by me.  Lab Results   Component Value Date    WBC 5.55 11/23/2021    HGB 11.8 (L) 11/23/2021    HCT 37.0 11/23/2021     (H) 11/23/2021    MCH 32.7 (H) 11/23/2021    MCHC 31.9 (L) 11/23/2021    RDW 13.0 11/23/2021     11/23/2021    MPV 9.3 11/23/2021       Lab Results   Component Value Date     11/23/2021    K 4.7 11/23/2021     11/23/2021    CO2 28 11/23/2021    GLU 81 11/23/2021    BUN 17 11/23/2021    CALCIUM 9.8 11/23/2021    PROT 7.4  11/23/2021    ALBUMIN 3.7 11/23/2021    BILITOT 0.5 11/23/2021    AST 29 11/23/2021    ALKPHOS 51 (L) 11/23/2021    ALT 21 11/23/2021       No results found for: COLORU, APPEARANCEUA, SPECGRAV, PHUR, PHUA, PROTEINUA, GLUCOSEU, KETONESU, BLOODU, LEUKOCYTESUR, NITRITE, UROBILINOGEN    Lab Results   Component Value Date    CRP 3.0 11/03/2020       Lab Results   Component Value Date    SEDRATE 20 11/03/2020       Lab Results   Component Value Date    .0 (H) 11/03/2020    SEDRATE 20 11/03/2020       No components found for: 25OHVITDTOT, 12HAENOB5, 90MEOIID7, METHODNOTE    No results found for: URICACID    No components found for: TSPOTTB    Imaging:  All imaging reviewed and independently interpreted by me.       ASSESSMENT / PLAN:     Lula De Leon is a 66 y.o. White female with:      1. Rheumatoid arthritis involving both hands, unspecified whether rheumatoid factor present  - Patient with known diagnosis of seropositive RA, discussed current management and her feeling well  - with synovitis  - discuss biologic use, patient has needle phobia, discussed Amrit  - start Rinvoq daily, SE discussed  - continue MTX 15mg weekly,  BID, HCQ 200mg daily      - update labs   - reassurance     2. Low Back and Knee Pain likely OA   -continue Tylenol PRN  -Reassurance and Exercise     3. DMARD Toxicity Monitoring   -labs today   -Daily FA  -Annual EYE exam     4. Other specified counseling  - over 10 minutes spent regarding below topics:  - Immunization counseling done.  - Nutrition and exercise counseling.  - Limitation of alcohol consumption.  - Regular exercise:  Aerobic and resistance.  - Medication counseling provided.      Follow up in about 3 months (around 8/24/2022).    Method of contact with patient concerns: Blainehart attn Rheumatology    Disclaimer:  This note is prepared using voice recognition software and as such is likely to have errors and has not been proof read. Please contact me for questions.     Time  spent: 30 minutes in face to face discussion concerning diagnosis, prognosis, review of lab and test results, benefits of treatment as well as management of disease, counseling of patient and coordination of care between various health care providers.  Greater than half the time spent was used for coordination of care and counseling of patient.    Ata Hooks M.D.  Rheumatology Department   [facility]

## 2022-05-25 LAB
HBV SURFACE AB SER-ACNC: NEGATIVE M[IU]/ML
HBV SURFACE AG SERPL QL IA: NEGATIVE
HCV AB SERPL QL IA: NEGATIVE
HIV 1+2 AB+HIV1 P24 AG SERPL QL IA: NEGATIVE

## 2022-05-25 RX ORDER — FOLIC ACID 1 MG/1
1 TABLET ORAL DAILY
Qty: 90 TABLET | Refills: 1 | Status: SHIPPED | OUTPATIENT
Start: 2022-05-25 | End: 2022-10-11 | Stop reason: SDUPTHER

## 2022-05-27 ENCOUNTER — PATIENT MESSAGE (OUTPATIENT)
Dept: RHEUMATOLOGY | Facility: CLINIC | Age: 67
End: 2022-05-27
Payer: MEDICARE

## 2022-05-31 ENCOUNTER — PATIENT MESSAGE (OUTPATIENT)
Dept: ADMINISTRATIVE | Facility: HOSPITAL | Age: 67
End: 2022-05-31
Payer: MEDICARE

## 2022-06-02 ENCOUNTER — INITIAL CONSULT (OUTPATIENT)
Dept: VASCULAR SURGERY | Facility: CLINIC | Age: 67
End: 2022-06-02
Payer: MEDICARE

## 2022-06-02 VITALS
SYSTOLIC BLOOD PRESSURE: 158 MMHG | BODY MASS INDEX: 26.51 KG/M2 | WEIGHT: 147.25 LBS | DIASTOLIC BLOOD PRESSURE: 98 MMHG

## 2022-06-02 DIAGNOSIS — Q27.39: Primary | ICD-10-CM

## 2022-06-02 DIAGNOSIS — Q27.39: ICD-10-CM

## 2022-06-02 PROCEDURE — 99999 PR PBB SHADOW E&M-EST. PATIENT-LVL III: ICD-10-PCS | Mod: PBBFAC,,, | Performed by: SURGERY

## 2022-06-02 PROCEDURE — 99204 OFFICE O/P NEW MOD 45 MIN: CPT | Mod: S$GLB,,, | Performed by: SURGERY

## 2022-06-02 PROCEDURE — 99204 PR OFFICE/OUTPT VISIT, NEW, LEVL IV, 45-59 MIN: ICD-10-PCS | Mod: S$GLB,,, | Performed by: SURGERY

## 2022-06-02 PROCEDURE — 99999 PR PBB SHADOW E&M-EST. PATIENT-LVL III: CPT | Mod: PBBFAC,,, | Performed by: SURGERY

## 2022-06-02 NOTE — PROGRESS NOTES
Marvin Shaw MD VI                       [facility]                         06/02/2022    HPI:  Lula De Leon is a 66 y.o. female with   Patient Active Problem List   Diagnosis    Elevated blood pressure reading    Rheumatoid arthritis involving both hands    Encounter for screening for malignant neoplasm of breast    Gastroesophageal reflux disease    Hyperlipidemia    being managed by PCP and specialists who is here today for evaluation of pulsating neck structures.  US shows dilated R brachiocephalic artery.  No TIA or CVA.  No SOB or CP.  Patient states location is neck occurring for yrs.  Associated signs and symptoms include none.  Quality is dull and severity is 1/10.   Alleviating factors include none.  Worsening factors include none.    no MI  no Stroke  Tobacco use: denies    Past Medical History:   Diagnosis Date    Rheumatoid arthritis      Past Surgical History:   Procedure Laterality Date    BREAST LUMPECTOMY Right     exc biopsy    BREAST SURGERY      [facility]    CARPAL TUNNEL RELEASE Right     SINUS SURGERY  2017    Dr. Christel FLORES and Dr. Chilel     Family History   Problem Relation Age of Onset    Arthritis Mother     Aneurysm Mother 64    Heart attack Father 70    Epilepsy Sister      Social History     Socioeconomic History    Marital status:    Occupational History    Occupation: retired - used to make prosthesis and dental implants   Tobacco Use    Smoking status: Never Smoker   Substance and Sexual Activity    Alcohol use: Yes       Current Outpatient Medications:     ascorbic acid, vitamin C, (VITAMIN C) 500 MG tablet, Take 500 mg by mouth., Disp: , Rfl:     atorvastatin (LIPITOR) 20 MG tablet, Take 1 tablet (20 mg total) by mouth once daily., Disp: 90 tablet, Rfl: 1    beta carotene 43569 UNIT capsule, Take 25,000 Units by mouth., Disp: , Rfl:     calcium carbonate (OS-ONEIDA) 600 mg calcium (1,500 mg)  Tab, Take 600 mg by mouth., Disp: , Rfl:     cetirizine (ZYRTEC) 10 MG tablet, Take 10 mg by mouth., Disp: , Rfl:     cyanocobalamin (VITAMIN B-12) 1000 MCG tablet, Take 1,000 mcg by mouth., Disp: , Rfl:     diclofenac sodium (VOLTAREN) 1 % Gel, Apply 2 g topically 4 (four) times daily., Disp: 1 Tube, Rfl: 3    richa prim-linoleic-gamolenic ac (PRIMROSE OIL) 1,000 mg Cap, Take by mouth., Disp: , Rfl:     flaxseed oil Oil, 800 mg by Other route., Disp: , Rfl:     folic acid (FOLVITE) 1 MG tablet, Take 1 tablet (1 mg total) by mouth once daily., Disp: 90 tablet, Rfl: 1    hydrOXYchloroQUINE (PLAQUENIL) 200 mg tablet, Take 1 tablet (200 mg total) by mouth once daily., Disp: 90 tablet, Rfl: 1    iron bisgly,ps-FA-B-C#12-succ 65 mg-65 mg -1,000 mcg (24) Tab, Take by mouth., Disp: , Rfl:     losartan (COZAAR) 25 MG tablet, Take 1 tablet (25 mg total) by mouth once daily., Disp: 30 tablet, Rfl: 5    MAGNESIUM CARBONATE ORAL, Take 600 mg by mouth., Disp: , Rfl:     methotrexate 2.5 MG Tab, Take 6 tablets (15 mg total) by mouth every 7 days., Disp: 84 tablet, Rfl: 1    omega-3 fatty acids/fish oil (FISH OIL-OMEGA-3 FATTY ACIDS) 300-1,000 mg capsule, Take 2 g by mouth., Disp: , Rfl:     pantoprazole (PROTONIX) 40 MG tablet, Take 1 tablet (40 mg total) by mouth once daily., Disp: 90 tablet, Rfl: 1    sulfaSALAzine (AZULFIDINE) 500 mg Tab, Take 1 tablet (500 mg total) by mouth 2 (two) times a day., Disp: 180 tablet, Rfl: 1    vitamin E 400 UNIT capsule, Take 400 Units by mouth., Disp: , Rfl:     zinc gluconate 50 mg tablet, Take 50 mg by mouth., Disp: , Rfl:     REVIEW OF SYSTEMS:  General: No fevers or chills; ENT: No sore throat; Allergy and Immunology: no persistent infections; Hematological and Lymphatic: No history of bleeding or easy bruising; Endocrine: negative; Respiratory: no cough, shortness of breath, or wheezing; Cardiovascular: no chest pain or dyspnea on exertion; Gastrointestinal: no abdominal  pain/back, change in bowel habits, or bloody stools; Genito-Urinary: no dysuria, trouble voiding, or hematuria; Musculoskeletal: negative; Neurological: no TIA or stroke symptoms; Psychiatric: no nervousness, anxiety or depression.    PHYSICAL EXAM:                General appearance:  Alert, well-appearing, and in no distress.  Oriented to person, place, and time                    Neurological: Normal speech, no focal findings noted; CN II - XII grossly intact. RLE with  sensation to light touch, LLE wit sensation to light touch.            Musculoskeletal: Digits/nail without cyanosis/clubbing.  Strength 5/5 BUE.                    Neck: Supple, no significant adenopathy, no carotid bruit can be auscultated, pulsatility to neck, palpable carotid arteries, non tender                  Chest:  Clear to auscultation, no wheezes, rales or rhonchi, symmetric air entry. No use of accessory muscles               Cardiac: Normal rate and regular rhythm, S1 and S2 normal            Abdomen: Soft, nontender, nondistended, no masses or organomegaly, no hernia     No rebound tenderness noted; bowel sounds normal     Pulsatile aortic mass is non palpable.     No groin adenopathy      Extremities:   2+ R femoral pulse, 2+ L femoral pulse     1+ R popliteal pulse, 1+ L popliteal pulse     1+ R PT pulse, 1+ L PT pulse     1+ R DP pulse, 1+ L DP pulse     no RLE edema, no LLE edema    Skin: RLE no ulcer; LLE no ulcer    LAB RESULTS:  No results found for: CBC  No results found for: LABPROT, INR  Lab Results   Component Value Date     05/24/2022    K 4.2 05/24/2022     05/24/2022    CO2 28 05/24/2022     05/24/2022    BUN 14 05/24/2022    CREATININE 0.8 05/24/2022    CALCIUM 10.0 05/24/2022    ANIONGAP 6 (L) 05/24/2022    EGFRNONAA >60 05/24/2022     Lab Results   Component Value Date    WBC 4.99 05/24/2022    RBC 3.79 (L) 05/24/2022    HGB 11.7 (L) 05/24/2022    HCT 37.7 05/24/2022     (H) 05/24/2022     MCH 30.9 05/24/2022    MCHC 31.0 (L) 05/24/2022    RDW 13.8 05/24/2022     05/24/2022    MPV 9.4 05/24/2022    GRAN 2.3 05/24/2022    GRAN 45.1 05/24/2022    LYMPH 2.1 05/24/2022    LYMPH 42.1 05/24/2022    MONO 0.5 05/24/2022    MONO 10.2 05/24/2022    EOS 0.1 05/24/2022    BASO 0.03 05/24/2022    EOSINOPHIL 1.8 05/24/2022    BASOPHIL 0.6 05/24/2022    DIFFMETHOD Automated 05/24/2022     .  Lab Results   Component Value Date    HGBA1C 4.8 05/24/2022       IMAGING:  All pertinent imaging has been reviewed and interpreted independently.    IMP/PLAN:  66 y.o. 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    -Rec CTA head/neck when able  -Daily ASA  -will obtain non contrast scan at this time to evaluate for aneurysm  -RTC after scan      I spent 12 minutes evaluating this patient and greater than 50% of the time was spent counseling, coordinator care and discussing the plan of care.  All questions were answered and patient stated understanding with agreement with the above treatment plan.    Marvin Shaw MD [facility]  Vascular and Endovascular Surgery

## 2022-08-04 ENCOUNTER — PATIENT MESSAGE (OUTPATIENT)
Dept: ADMINISTRATIVE | Facility: HOSPITAL | Age: 67
End: 2022-08-04
Payer: MEDICARE

## 2022-08-04 ENCOUNTER — PATIENT OUTREACH (OUTPATIENT)
Dept: ADMINISTRATIVE | Facility: HOSPITAL | Age: 67
End: 2022-08-04
Payer: MEDICARE

## 2022-08-04 NOTE — PROGRESS NOTES
Attempted to LVM for pt to call back and schedule a nurse BP visit, visit with PCP or give a home BP reading.No VM. Immunization's updated.

## 2022-08-08 ENCOUNTER — OFFICE VISIT (OUTPATIENT)
Dept: DERMATOLOGY | Facility: CLINIC | Age: 67
End: 2022-08-08
Payer: MEDICARE

## 2022-08-08 DIAGNOSIS — D18.01 ANGIOMA OF SKIN: ICD-10-CM

## 2022-08-08 DIAGNOSIS — D48.5 NEOPLASM OF UNCERTAIN BEHAVIOR OF SKIN: Primary | ICD-10-CM

## 2022-08-08 DIAGNOSIS — M67.449 DIGITAL MUCOUS CYST: ICD-10-CM

## 2022-08-08 DIAGNOSIS — Z12.83 SCREENING EXAM FOR SKIN CANCER: ICD-10-CM

## 2022-08-08 DIAGNOSIS — L82.1 SK (SEBORRHEIC KERATOSIS): ICD-10-CM

## 2022-08-08 DIAGNOSIS — L81.4 LENTIGINES: ICD-10-CM

## 2022-08-08 PROCEDURE — 1126F AMNT PAIN NOTED NONE PRSNT: CPT | Mod: CPTII,S$GLB,, | Performed by: DERMATOLOGY

## 2022-08-08 PROCEDURE — 1101F PT FALLS ASSESS-DOCD LE1/YR: CPT | Mod: CPTII,S$GLB,, | Performed by: DERMATOLOGY

## 2022-08-08 PROCEDURE — 88305 TISSUE EXAM BY PATHOLOGIST: ICD-10-PCS | Mod: 26,,, | Performed by: PATHOLOGY

## 2022-08-08 PROCEDURE — 11102 TANGNTL BX SKIN SINGLE LES: CPT | Mod: S$GLB,,, | Performed by: DERMATOLOGY

## 2022-08-08 PROCEDURE — 3288F PR FALLS RISK ASSESSMENT DOCUMENTED: ICD-10-PCS | Mod: CPTII,S$GLB,, | Performed by: DERMATOLOGY

## 2022-08-08 PROCEDURE — 1159F PR MEDICATION LIST DOCUMENTED IN MEDICAL RECORD: ICD-10-PCS | Mod: CPTII,S$GLB,, | Performed by: DERMATOLOGY

## 2022-08-08 PROCEDURE — 4010F ACE/ARB THERAPY RXD/TAKEN: CPT | Mod: CPTII,S$GLB,, | Performed by: DERMATOLOGY

## 2022-08-08 PROCEDURE — 99213 PR OFFICE/OUTPT VISIT, EST, LEVL III, 20-29 MIN: ICD-10-PCS | Mod: 25,S$GLB,, | Performed by: DERMATOLOGY

## 2022-08-08 PROCEDURE — 1126F PR PAIN SEVERITY QUANTIFIED, NO PAIN PRESENT: ICD-10-PCS | Mod: CPTII,S$GLB,, | Performed by: DERMATOLOGY

## 2022-08-08 PROCEDURE — 99999 PR PBB SHADOW E&M-EST. PATIENT-LVL III: CPT | Mod: PBBFAC,,, | Performed by: DERMATOLOGY

## 2022-08-08 PROCEDURE — 1159F MED LIST DOCD IN RCRD: CPT | Mod: CPTII,S$GLB,, | Performed by: DERMATOLOGY

## 2022-08-08 PROCEDURE — 11102 PR TANGENTIAL BIOPSY, SKIN, SINGLE LESION: ICD-10-PCS | Mod: S$GLB,,, | Performed by: DERMATOLOGY

## 2022-08-08 PROCEDURE — 1160F PR REVIEW ALL MEDS BY PRESCRIBER/CLIN PHARMACIST DOCUMENTED: ICD-10-PCS | Mod: CPTII,S$GLB,, | Performed by: DERMATOLOGY

## 2022-08-08 PROCEDURE — 3044F HG A1C LEVEL LT 7.0%: CPT | Mod: CPTII,S$GLB,, | Performed by: DERMATOLOGY

## 2022-08-08 PROCEDURE — 88305 TISSUE EXAM BY PATHOLOGIST: CPT | Performed by: PATHOLOGY

## 2022-08-08 PROCEDURE — 99213 OFFICE O/P EST LOW 20 MIN: CPT | Mod: 25,S$GLB,, | Performed by: DERMATOLOGY

## 2022-08-08 PROCEDURE — 3044F PR MOST RECENT HEMOGLOBIN A1C LEVEL <7.0%: ICD-10-PCS | Mod: CPTII,S$GLB,, | Performed by: DERMATOLOGY

## 2022-08-08 PROCEDURE — 88305 TISSUE EXAM BY PATHOLOGIST: CPT | Mod: 26,,, | Performed by: PATHOLOGY

## 2022-08-08 PROCEDURE — 99999 PR PBB SHADOW E&M-EST. PATIENT-LVL III: ICD-10-PCS | Mod: PBBFAC,,, | Performed by: DERMATOLOGY

## 2022-08-08 PROCEDURE — 1160F RVW MEDS BY RX/DR IN RCRD: CPT | Mod: CPTII,S$GLB,, | Performed by: DERMATOLOGY

## 2022-08-08 PROCEDURE — 3288F FALL RISK ASSESSMENT DOCD: CPT | Mod: CPTII,S$GLB,, | Performed by: DERMATOLOGY

## 2022-08-08 PROCEDURE — 4010F PR ACE/ARB THEARPY RXD/TAKEN: ICD-10-PCS | Mod: CPTII,S$GLB,, | Performed by: DERMATOLOGY

## 2022-08-08 PROCEDURE — 1101F PR PT FALLS ASSESS DOC 0-1 FALLS W/OUT INJ PAST YR: ICD-10-PCS | Mod: CPTII,S$GLB,, | Performed by: DERMATOLOGY

## 2022-08-08 NOTE — PATIENT INSTRUCTIONS
Shave Biopsy Wound Care    Your doctor has performed a shave biopsy today.  A band aid and vaseline ointment has been placed over the site.  This should remain in place for NO LONGER THAN 48 hours.  It is fine to remove the bandaid after 24 hours, if the area is no longer bleeding. It is recommended that you keep the area dry (do not wet)) for the first 24 hours.  After 24 hours, wash the area with warm soap and water and apply Vaseline jelly.  Many patients prefer to use Neosporin or Bacitracin ointment.  This is acceptable; however, know that you can develop an allergy to this medication even if you have used it safely for years.  It is important to keep the area moist.  Letting it dry out and get air slows healing time, and will worsen the scar.        If you notice increasing redness, tenderness, pain, or yellow drainage at the biopsy site, please notify your doctor.  These are signs of an infection.    If your biopsy site is bleeding, apply firm pressure for 15 minutes straight.  Repeat for another 15 minutes, if it is still bleeding.   If the surgical site continues to bleed, then please contact your doctor.      For [facility] users:   You will receive your biopsy results in [facility] as soon as they are available. Please be assured that your physician/provider will review your results and will then determine what further treatment, evaluation, or planning is required. You should be contacted by your physician's/provider's office within 5 business days of receiving your results; If not, please reach out to directly. This is one more way [email or web address] is putting you first.     [facility] [place] [address]/ [phone] FAX/ [email or web address]

## 2022-08-08 NOTE — PROGRESS NOTES
Subjective:       Patient ID:  Lula De Leon is a 66 y.o. female who presents for   Chief Complaint   Patient presents with    Skin Check     History of Present Illness: The patient presents to hospitals care and for skin check.    The patient was last seen about 1 year ago by Dr. Sands and reports no hx skin cancer.    Patient with new complaint of lesion(s)  Location: right ear  Duration: 1 year ago - treated with cryo by Dr. Sands  Symptoms: won't heal  Relieving factors/Previous treatments: cryo    Patient with new complaint of lesion(s)  Location: left toe  Duration: 6 months  Symptoms: bump - no pain  Relieving factors/Previous treatments: none  Has h/o RA - on MTX and sulfasalazine and plaquenil            Review of Systems   Skin: Positive for daily sunscreen use and activity-related sunscreen use. Negative for recent sunburn.   Hematologic/Lymphatic: Does not bruise/bleed easily.        Objective:    Physical Exam   Constitutional: She appears well-developed and well-nourished. No distress.   Neurological: She is alert and oriented to person, place, and time. She is not disoriented.   Psychiatric: She has a normal mood and affect.   Skin:   Areas Examined (abnormalities noted in diagram):   Head / Face Inspection Performed  Neck Inspection Performed  Chest / Axilla Inspection Performed  Abdomen Inspection Performed  Genitals / Buttocks / Groin Inspection Performed  Back Inspection Performed  RUE Inspected  LUE Inspection Performed  RLE Inspected  LLE Inspection Performed  Nails and Digits Inspection Performed                       Diagram Legend     Erythematous scaling macule/papule c/w actinic keratosis       Vascular papule c/w angioma      Pigmented verrucoid papule/plaque c/w seborrheic keratosis      Yellow umbilicated papule c/w sebaceous hyperplasia      Irregularly shaped tan macule c/w lentigo     1-2 mm smooth white papules consistent with Milia      Movable subcutaneous cyst with punctum c/w  epidermal inclusion cyst      Subcutaneous movable cyst c/w pilar cyst      Firm pink to brown papule c/w dermatofibroma      Pedunculated fleshy papule(s) c/w skin tag(s)      Evenly pigmented macule c/w junctional nevus     Mildly variegated pigmented, slightly irregular-bordered macule c/w mildly atypical nevus      Flesh colored to evenly pigmented papule c/w intradermal nevus       Pink pearly papule/plaque c/w basal cell carcinoma      Erythematous hyperkeratotic cursted plaque c/w SCC      Surgical scar with no sign of skin cancer recurrence      Open and closed comedones      Inflammatory papules and pustules      Verrucoid papule consistent consistent with wart     Erythematous eczematous patches and plaques     Dystrophic onycholytic nail with subungual debris c/w onychomycosis     Umbilicated papule    Erythematous-base heme-crusted tan verrucoid plaque consistent with inflamed seborrheic keratosis     Erythematous Silvery Scaling Plaque c/w Psoriasis     See annotation              Assessment / Plan:      Pathology Orders:     Normal Orders This Visit    Specimen to Pathology, Dermatology     Questions:    Procedure Type: Dermatology and skin neoplasms    Number of Specimens: 1    ------------------------: -------------------------    Spec 1 Procedure: Biopsy    Spec 1 Clinical Impression: r/o bcc    Spec 1 Source: right ear    Release to patient: Immediate        Neoplasm of uncertain behavior of skin  -     Specimen to Pathology, Dermatology  Shave biopsy procedure note:    Shave biopsy performed after verbal consent including risk of infection, scar, recurrence, need for additional treatment of site. Area prepped with alcohol, anesthetized with approximately 1.0cc of 1% lidocaine with epinephrine. Lesional tissue shaved with razor blade. Hemostasis achieved with application of aluminum chloride followed by hyfrecation. No complications. Dressing applied. Wound care explained.    If biopsy positive for  malignancy, refer to Dr. Ricks for Mohs surgery consultation.      Lentigines  This is a benign hyperpigmented sun induced lesion. Recommend daily sun protection/avoidance and use of at least SPF 30, broad spectrum sunscreen (OTC drug) will reduce the number of new lesions. Treatment of these benign lesions are considered cosmetic.        SK (seborrheic keratosis)  These are benign inherited growths without a malignant potential. Reassurance given to patient. No treatment is necessary.       Angioma of skin  This is a benign vascular lesion. Reassurance given. No treatment required.       Digital mucous cyst - left 3rd toe  Reassurance given to patient. No treatment is necessary.   Discussed excision by foot ortho if desired    Screening for skin cancer  Total body skin examination performed today including at least 12 points as noted in physical examination. Suspicious lesions noted.    Recommend daily sun protection/avoidance, use of at least SPF 30, broad spectrum sunscreen (OTC drug), skin self examinations, and routine physician surveillance to optimize early detection               Follow up in about 6 months (around 2/8/2023).

## 2022-08-15 ENCOUNTER — HOSPITAL ENCOUNTER (OUTPATIENT)
Dept: RADIOLOGY | Facility: HOSPITAL | Age: 67
Discharge: HOME | End: 2022-08-15
Attending: SURGERY
Payer: MEDICARE

## 2022-08-15 DIAGNOSIS — Q27.39: ICD-10-CM

## 2022-08-15 PROCEDURE — 70490 CT NECK CHEST WITHOUT CONTRAST (XPD): ICD-10-PCS | Mod: 26,,, | Performed by: INTERNAL MEDICINE

## 2022-08-15 PROCEDURE — 70490 CT SOFT TISSUE NECK W/O DYE: CPT | Mod: 26,,, | Performed by: INTERNAL MEDICINE

## 2022-08-15 PROCEDURE — 71250 CT THORAX DX C-: CPT | Mod: 26,,, | Performed by: INTERNAL MEDICINE

## 2022-08-15 PROCEDURE — 70490 CT SOFT TISSUE NECK W/O DYE: CPT | Mod: TC

## 2022-08-15 PROCEDURE — 71250 CT NECK CHEST WITHOUT CONTRAST (XPD): ICD-10-PCS | Mod: 26,,, | Performed by: INTERNAL MEDICINE

## 2022-08-16 LAB
FINAL PATHOLOGIC DIAGNOSIS: NORMAL
GROSS: NORMAL
Lab: NORMAL
MICROSCOPIC EXAM: NORMAL

## 2022-08-16 NOTE — PROGRESS NOTES
1. Skin, right ear, shave biopsy:   - BASAL CELL CARCINOMA.   - THE TUMOR FOCALLY EXTENDS TO THE DEEP AND LATERAL BIOPSY MARGINS.     Recommend patient to Dr. Ricks for Mohs surgery consultation. Picture in chart.     General derm team: Sent patient for Mohs surgery. F/u with me in 6 months.

## 2022-08-26 DIAGNOSIS — K21.9 GASTROESOPHAGEAL REFLUX DISEASE, UNSPECIFIED WHETHER ESOPHAGITIS PRESENT: ICD-10-CM

## 2022-08-26 RX ORDER — PANTOPRAZOLE SODIUM 40 MG/1
40 TABLET, DELAYED RELEASE ORAL DAILY
Qty: 90 TABLET | Refills: 2 | Status: SHIPPED | OUTPATIENT
Start: 2022-08-26 | End: 2023-05-31 | Stop reason: SDUPTHER

## 2022-08-26 NOTE — TELEPHONE ENCOUNTER
No new care gaps identified.  [facility] Embedded Care Gaps. Reference number: [id number]. 8/26/2022   7:58:06 AM ZAIRET

## 2022-08-26 NOTE — TELEPHONE ENCOUNTER
Refill Decision Note   Lula De Leon  is requesting a refill authorization.  Brief Assessment and Rationale for Refill:  Approve     Medication Therapy Plan:       Medication Reconciliation Completed: No   Comments:     Provider Staff:     Action is required for this patient.   Please see care gap opportunities below in Care Due Message.     Thanks!  [facility]     Appointments      Date Provider   Last Visit   4/25/2022 Sylvain Haywood MD   Next Visit   10/17/2022 Sylvain Haywood MD     Note composed:9:25 AM 08/26/2022           Note composed:9:25 AM 08/26/2022

## 2022-10-10 ENCOUNTER — TELEPHONE (OUTPATIENT)
Dept: RHEUMATOLOGY | Facility: CLINIC | Age: 67
End: 2022-10-10
Payer: MEDICARE

## 2022-10-11 ENCOUNTER — SPECIALTY PHARMACY (OUTPATIENT)
Dept: PHARMACY | Facility: CLINIC | Age: 67
End: 2022-10-11

## 2022-10-11 ENCOUNTER — OFFICE VISIT (OUTPATIENT)
Dept: RHEUMATOLOGY | Facility: CLINIC | Age: 67
End: 2022-10-11
Payer: MEDICARE

## 2022-10-11 VITALS
DIASTOLIC BLOOD PRESSURE: 77 MMHG | HEIGHT: 63 IN | OXYGEN SATURATION: 98 % | WEIGHT: 153 LBS | BODY MASS INDEX: 27.11 KG/M2 | RESPIRATION RATE: 20 BRPM | HEART RATE: 88 BPM | SYSTOLIC BLOOD PRESSURE: 162 MMHG

## 2022-10-11 DIAGNOSIS — D84.821 IMMUNODEFICIENCY DUE TO TREATMENT WITH IMMUNOSUPPRESSIVE MEDICATION: ICD-10-CM

## 2022-10-11 DIAGNOSIS — M15.9 PRIMARY OSTEOARTHRITIS INVOLVING MULTIPLE JOINTS: ICD-10-CM

## 2022-10-11 DIAGNOSIS — Z71.89 COUNSELING AND COORDINATION OF CARE: ICD-10-CM

## 2022-10-11 DIAGNOSIS — M06.9 RHEUMATOID ARTHRITIS INVOLVING BOTH HANDS, UNSPECIFIED WHETHER RHEUMATOID FACTOR PRESENT: Primary | ICD-10-CM

## 2022-10-11 DIAGNOSIS — Z79.899 IMMUNODEFICIENCY DUE TO TREATMENT WITH IMMUNOSUPPRESSIVE MEDICATION: ICD-10-CM

## 2022-10-11 PROCEDURE — 3044F HG A1C LEVEL LT 7.0%: CPT | Mod: CPTII,S$GLB,, | Performed by: INTERNAL MEDICINE

## 2022-10-11 PROCEDURE — 99999 PR PBB SHADOW E&M-EST. PATIENT-LVL III: ICD-10-PCS | Mod: PBBFAC,,, | Performed by: INTERNAL MEDICINE

## 2022-10-11 PROCEDURE — 4010F ACE/ARB THERAPY RXD/TAKEN: CPT | Mod: CPTII,S$GLB,, | Performed by: INTERNAL MEDICINE

## 2022-10-11 PROCEDURE — 1101F PT FALLS ASSESS-DOCD LE1/YR: CPT | Mod: CPTII,S$GLB,, | Performed by: INTERNAL MEDICINE

## 2022-10-11 PROCEDURE — 3077F SYST BP >= 140 MM HG: CPT | Mod: CPTII,S$GLB,, | Performed by: INTERNAL MEDICINE

## 2022-10-11 PROCEDURE — 99214 PR OFFICE/OUTPT VISIT, EST, LEVL IV, 30-39 MIN: ICD-10-PCS | Mod: S$GLB,,, | Performed by: INTERNAL MEDICINE

## 2022-10-11 PROCEDURE — 1101F PR PT FALLS ASSESS DOC 0-1 FALLS W/OUT INJ PAST YR: ICD-10-PCS | Mod: CPTII,S$GLB,, | Performed by: INTERNAL MEDICINE

## 2022-10-11 PROCEDURE — 3078F PR MOST RECENT DIASTOLIC BLOOD PRESSURE < 80 MM HG: ICD-10-PCS | Mod: CPTII,S$GLB,, | Performed by: INTERNAL MEDICINE

## 2022-10-11 PROCEDURE — 1125F PR PAIN SEVERITY QUANTIFIED, PAIN PRESENT: ICD-10-PCS | Mod: CPTII,S$GLB,, | Performed by: INTERNAL MEDICINE

## 2022-10-11 PROCEDURE — 3008F BODY MASS INDEX DOCD: CPT | Mod: CPTII,S$GLB,, | Performed by: INTERNAL MEDICINE

## 2022-10-11 PROCEDURE — 3288F FALL RISK ASSESSMENT DOCD: CPT | Mod: CPTII,S$GLB,, | Performed by: INTERNAL MEDICINE

## 2022-10-11 PROCEDURE — 1159F MED LIST DOCD IN RCRD: CPT | Mod: CPTII,S$GLB,, | Performed by: INTERNAL MEDICINE

## 2022-10-11 PROCEDURE — 4010F PR ACE/ARB THEARPY RXD/TAKEN: ICD-10-PCS | Mod: CPTII,S$GLB,, | Performed by: INTERNAL MEDICINE

## 2022-10-11 PROCEDURE — 3008F PR BODY MASS INDEX (BMI) DOCUMENTED: ICD-10-PCS | Mod: CPTII,S$GLB,, | Performed by: INTERNAL MEDICINE

## 2022-10-11 PROCEDURE — 3078F DIAST BP <80 MM HG: CPT | Mod: CPTII,S$GLB,, | Performed by: INTERNAL MEDICINE

## 2022-10-11 PROCEDURE — 99214 OFFICE O/P EST MOD 30 MIN: CPT | Mod: S$GLB,,, | Performed by: INTERNAL MEDICINE

## 2022-10-11 PROCEDURE — 3288F PR FALLS RISK ASSESSMENT DOCUMENTED: ICD-10-PCS | Mod: CPTII,S$GLB,, | Performed by: INTERNAL MEDICINE

## 2022-10-11 PROCEDURE — 1125F AMNT PAIN NOTED PAIN PRSNT: CPT | Mod: CPTII,S$GLB,, | Performed by: INTERNAL MEDICINE

## 2022-10-11 PROCEDURE — 3044F PR MOST RECENT HEMOGLOBIN A1C LEVEL <7.0%: ICD-10-PCS | Mod: CPTII,S$GLB,, | Performed by: INTERNAL MEDICINE

## 2022-10-11 PROCEDURE — 3077F PR MOST RECENT SYSTOLIC BLOOD PRESSURE >= 140 MM HG: ICD-10-PCS | Mod: CPTII,S$GLB,, | Performed by: INTERNAL MEDICINE

## 2022-10-11 PROCEDURE — 1159F PR MEDICATION LIST DOCUMENTED IN MEDICAL RECORD: ICD-10-PCS | Mod: CPTII,S$GLB,, | Performed by: INTERNAL MEDICINE

## 2022-10-11 PROCEDURE — 99999 PR PBB SHADOW E&M-EST. PATIENT-LVL III: CPT | Mod: PBBFAC,,, | Performed by: INTERNAL MEDICINE

## 2022-10-11 RX ORDER — FOLIC ACID 1 MG/1
1 TABLET ORAL DAILY
Qty: 90 TABLET | Refills: 1 | Status: SHIPPED | OUTPATIENT
Start: 2022-10-11 | End: 2023-01-04 | Stop reason: SDUPTHER

## 2022-10-11 RX ORDER — HYDROXYCHLOROQUINE SULFATE 200 MG/1
200 TABLET, FILM COATED ORAL DAILY
Qty: 90 TABLET | Refills: 1 | Status: SHIPPED | OUTPATIENT
Start: 2022-10-11 | End: 2023-01-04 | Stop reason: SDUPTHER

## 2022-10-11 RX ORDER — METHOTREXATE 2.5 MG/1
15 TABLET ORAL
Qty: 84 TABLET | Refills: 1 | Status: SHIPPED | OUTPATIENT
Start: 2022-10-11 | End: 2023-01-04 | Stop reason: SDUPTHER

## 2022-10-11 RX ORDER — UPADACITINIB 15 MG/1
15 TABLET, EXTENDED RELEASE ORAL DAILY
Qty: 30 TABLET | Refills: 11 | Status: SHIPPED | OUTPATIENT
Start: 2022-10-11

## 2022-10-11 RX ORDER — SULFASALAZINE 500 MG/1
500 TABLET ORAL 2 TIMES DAILY
Qty: 180 TABLET | Refills: 1 | Status: SHIPPED | OUTPATIENT
Start: 2022-10-11 | End: 2023-09-26

## 2022-10-11 NOTE — PROGRESS NOTES
RHEUMATOLOGY OUTPATIENT CLINIC NOTE    10/11/2022    Attending Rheumatologist: Ata Hooks  Primary Care Provider: Sylvain Haywood MD   Physician Requesting Consultation: No referring provider defined for this encounter.  Chief Complaint/Reason For Consultation:  No chief complaint on file.      Subjective:       JOSSY De Leon is a 66 y.o. White female with Hx of Seropositive RA who comes to \[facility]\"" care.   Patient diagnosed two years ago. Reports it started in the wrist as swelling and pain, could barely move it. Then was seen by a Rheumatologist which placed her on therapy eventually escalating to Triple Therapy (SSZ, MTX, HCQ) for control. Was on MTX 8 tabs, SSZ 4 and decreased to MTX 4 and SSZ 2 in August due to Renal Function.   Patient reports good response on current therapy. Denies joint swelling, tenderness. Reports about 10-15 of morning stiffness. Reports also lower back and knee pain. Most typically with use. Was prior on NSAIDs but stopped, now on Tylenol with good relief.   FHx: Possible Mom, and Aunt with RA     Today  Patient here for follow up.   Last visit RA management continued and Rinvoq added due to persistent arthritis. She notes amazing response within one month. Notes resolution of joint pain, swelling and morning stiffness. Ran out about 4 weeks ago and noted right mild foot swelling Tolerating meds.     Review of Systems   Constitutional:  Negative for appetite change, chills, fatigue, fever and unexpected weight change.   HENT:  Negative for nasal congestion, ear discharge, ear pain, hearing loss, mouth sores, nosebleeds, sneezing, sore throat, tinnitus and trouble swallowing.    Eyes:  Negative for photophobia, pain, discharge, redness, itching and visual disturbance.   Respiratory:  Negative for cough, chest tightness, shortness of breath and wheezing.    Cardiovascular:  Negative for chest pain, palpitations and leg swelling.   Gastrointestinal:  Negative for  abdominal distention, abdominal pain, blood in stool, constipation, diarrhea, nausea and vomiting.   Endocrine: Negative for cold intolerance, heat intolerance, polydipsia, polyphagia and polyuria.   Genitourinary:  Negative for difficulty urinating, dyspareunia, dysuria, flank pain, frequency, genital sores, hematuria, menstrual problem, pelvic pain, urgency, vaginal bleeding, vaginal discharge, vaginal pain and vaginal dryness.   Musculoskeletal:  Positive for arthralgias and joint swelling. Negative for back pain, gait problem, leg pain, myalgias, neck pain, neck stiffness and joint deformity.   Integumentary:  Negative for pallor and rash.   Neurological:  Negative for dizziness, seizures, weakness, light-headedness, numbness and headaches.   Hematological:  Negative for adenopathy. Does not bruise/bleed easily.   Psychiatric/Behavioral:  Negative for confusion, decreased concentration and sleep disturbance. The patient is not nervous/anxious.    All other systems reviewed and are negative.       Chronic comorbid conditions affecting medical decision making today:  Past Medical History:   Diagnosis Date    Rheumatoid arthritis      Past Surgical History:   Procedure Laterality Date    BREAST LUMPECTOMY Right     exc biopsy    BREAST SURGERY      lobectomy 2017 - [facility]    CARPAL TUNNEL RELEASE Right     SINUS SURGERY  2017    Dr. Kearney [facility] and Dr. Chilel     Family History   Problem Relation Age of Onset    Arthritis Mother     Aneurysm Mother 64    Heart attack Father 70    Epilepsy Sister      Social History     Substance and Sexual Activity   Alcohol Use Yes     Social History     Tobacco Use   Smoking Status Never   Smokeless Tobacco Not on file     Social History     Substance and Sexual Activity   Drug Use Not on file       Current Outpatient Medications:     ascorbic acid, vitamin C, (VITAMIN C) 500 MG tablet, Take 500 mg by mouth., Disp: , Rfl:     atorvastatin (LIPITOR) 20 MG tablet,  Take 1 tablet (20 mg total) by mouth once daily., Disp: 90 tablet, Rfl: 1    beta carotene 61830 UNIT capsule, Take 25,000 Units by mouth., Disp: , Rfl:     calcium carbonate (OS-ONEIDA) 600 mg calcium (1,500 mg) Tab, Take 600 mg by mouth., Disp: , Rfl:     cetirizine (ZYRTEC) 10 MG tablet, Take 10 mg by mouth., Disp: , Rfl:     cyanocobalamin (VITAMIN B-12) 1000 MCG tablet, Take 1,000 mcg by mouth., Disp: , Rfl:     diclofenac sodium (VOLTAREN) 1 % Gel, Apply 2 g topically 4 (four) times daily., Disp: 1 Tube, Rfl: 3    richa prim-linoleic-gamolenic ac (PRIMROSE OIL) 1,000 mg Cap, Take by mouth., Disp: , Rfl:     flaxseed oil Oil, 800 mg by Other route., Disp: , Rfl:     folic acid (FOLVITE) 1 MG tablet, Take 1 tablet (1 mg total) by mouth once daily., Disp: 90 tablet, Rfl: 1    hydrOXYchloroQUINE (PLAQUENIL) 200 mg tablet, Take 1 tablet (200 mg total) by mouth once daily., Disp: 90 tablet, Rfl: 1    iron bisgly,ps-FA-B-C#12-succ 65 mg-65 mg -1,000 mcg (24) Tab, Take by mouth., Disp: , Rfl:     losartan (COZAAR) 25 MG tablet, Take 1 tablet (25 mg total) by mouth once daily., Disp: 90 tablet, Rfl: 1    MAGNESIUM CARBONATE ORAL, Take 600 mg by mouth., Disp: , Rfl:     methotrexate 2.5 MG Tab, Take 6 tablets (15 mg total) by mouth every 7 days., Disp: 84 tablet, Rfl: 1    omega-3 fatty acids/fish oil (FISH OIL-OMEGA-3 FATTY ACIDS) 300-1,000 mg capsule, Take 2 g by mouth., Disp: , Rfl:     pantoprazole (PROTONIX) 40 MG tablet, Take 1 tablet (40 mg total) by mouth once daily., Disp: 90 tablet, Rfl: 2    sulfaSALAzine (AZULFIDINE) 500 mg Tab, Take 1 tablet (500 mg total) by mouth 2 (two) times a day., Disp: 180 tablet, Rfl: 1    upadacitinib (RINVOQ) 15 mg 24 hr tablet, Take 1 tablet (15 mg total) by mouth once daily., Disp: 30 tablet, Rfl: 11    vitamin E 400 UNIT capsule, Take 400 Units by mouth., Disp: , Rfl:     zinc gluconate 50 mg tablet, Take 50 mg by mouth., Disp: , Rfl:      Objective:         Vitals:    10/11/22 0853    BP: (!) 162/77   Pulse: 88   Resp: 20     Physical Exam   Constitutional: She is oriented to person, place, and time.   HENT:   Head: Normocephalic and atraumatic.   Right Ear: External ear normal.   Left Ear: External ear normal.   Nose: Nose normal.   Mouth/Throat: Oropharynx is clear and moist.   Eyes: Pupils are equal, round, and reactive to light. Conjunctivae are normal.   Cardiovascular: Normal rate and regular rhythm.   Pulmonary/Chest: Effort normal and breath sounds normal.   Abdominal: Soft. Bowel sounds are normal.   Musculoskeletal:      Right shoulder: Normal.      Left shoulder: Normal.      Right elbow: Normal.      Left elbow: Normal.      Right wrist: Normal.      Left wrist: Normal.      Cervical back: Normal range of motion and neck supple.      Right knee: Normal.      Left knee: Normal.      Comments: Cannot make fist, no synovitis  Knee crepitus  Mild right midfoot warmth    Neurological: She is alert and oriented to person, place, and time.   Skin: No rash noted. No erythema.   Psychiatric: Mood and affect normal.       Right Side Rheumatological Exam     Examination finds the shoulder, elbow, wrist, knee, 1st PIP, 1st MCP, 2nd PIP, 2nd MCP, 3rd PIP, 3rd MCP, 4th PIP, 4th MCP, 5th PIP and 5th MCP normal.    Knee Exam   Patellofemoral Crepitus: positive    Left Side Rheumatological Exam     Examination finds the shoulder, elbow, wrist, knee, 1st PIP, 1st MCP, 2nd PIP, 2nd MCP, 3rd PIP, 3rd MCP, 4th PIP, 4th MCP, 5th PIP and 5th MCP normal.    Knee Exam     Patellofemoral Crepitus: positive        Reviewed old and all outside pertinent medical records available.    All lab results personally reviewed and interpreted by me.  Lab Results   Component Value Date    WBC 4.99 05/24/2022    HGB 11.7 (L) 05/24/2022    HCT 37.7 05/24/2022     (H) 05/24/2022    MCH 30.9 05/24/2022    MCHC 31.0 (L) 05/24/2022    RDW 13.8 05/24/2022     05/24/2022    MPV 9.4 05/24/2022       Lab Results    Component Value Date     05/24/2022    K 4.2 05/24/2022     05/24/2022    CO2 28 05/24/2022     05/24/2022    BUN 14 05/24/2022    CALCIUM 10.0 05/24/2022    PROT 7.6 05/24/2022    ALBUMIN 3.7 05/24/2022    BILITOT 0.5 05/24/2022    AST 34 05/24/2022    ALKPHOS 51 (L) 05/24/2022    ALT 37 05/24/2022       No results found for: COLORU, APPEARANCEUA, SPECGRAV, PHUR, PHUA, PROTEINUA, GLUCOSEU, KETONESU, BLOODU, LEUKOCYTESUR, NITRITE, UROBILINOGEN    Lab Results   Component Value Date    CRP 3.0 11/03/2020       Lab Results   Component Value Date    SEDRATE 20 11/03/2020       Lab Results   Component Value Date    .0 (H) 11/03/2020    SEDRATE 20 11/03/2020       No components found for: 25OHVITDTOT, 14JHQCXK2, 93DAJYZN6, METHODNOTE    No results found for: URICACID    No components found for: TSPOTTB    Imaging:  All imaging reviewed and independently interpreted by me.       ASSESSMENT / PLAN:     Lula De Leon is a 66 y.o. White female with:      1. Rheumatoid arthritis involving both hands, unspecified whether rheumatoid factor present  - Patient with known diagnosis of seropositive RA, discussed current management and her feeling well  - last visit persistent synovitis despite triple therapy, was given Rinvoq as she has needle phobia, she notes within one month resolution of her joint pain and swelling  - continue Rinvoq  - continue MTX 15mg weekly,  BID, HCQ 200mg daily      - update labs   - reassurance     2. Low Back and Knee Pain likely OA   -continue Tylenol PRN  -Reassurance and Exercise     3. DMARD Toxicity Monitoring   -labs today   -Daily FA  -Annual EYE exam   -no live vaccines  -vaccines per guidelines     4. Other specified counseling  - over 10 minutes spent regarding below topics:  - Immunization counseling done.  - Nutrition and exercise counseling.  - Limitation of alcohol consumption.  - Regular exercise:  Aerobic and resistance.  - Medication counseling  provided.      Follow up in about 3 months (around 1/11/2023).    Method of contact with patient concerns: [facility]    Disclaimer:  This note is prepared using voice recognition software and as such is likely to have errors and has not been proof read. Please contact me for questions.     Time spent: 30 minutes in face to face discussion concerning diagnosis, prognosis, review of lab and test results, benefits of treatment as well as management of disease, counseling of patient and coordination of care between various health care providers.  Greater than half the time spent was used for coordination of care and counseling of patient.    Ata Hooks M.D.  Rheumatology Department   [facility]

## 2022-10-11 NOTE — TELEPHONE ENCOUNTER
Med, this is Madelin Danielle with [facility].  We are working on your prescription that your doctor has sent us. We will be working with your insurance to get this approved for you. We will be calling you along the way with updates on your medication.  If you have any questions, you can reach us at [phone].    Welcome call outcome: No answer/Unable to leave voicemail    Rinvoq rx received   -PA is required.  PA submitted.

## 2022-10-17 ENCOUNTER — LAB VISIT (OUTPATIENT)
Dept: LAB | Facility: HOSPITAL | Age: 67
End: 2022-10-17
Attending: INTERNAL MEDICINE
Payer: MEDICARE

## 2022-10-17 ENCOUNTER — OFFICE VISIT (OUTPATIENT)
Dept: FAMILY MEDICINE | Facility: CLINIC | Age: 67
End: 2022-10-17
Payer: MEDICARE

## 2022-10-17 VITALS
TEMPERATURE: 98 F | HEIGHT: 63 IN | BODY MASS INDEX: 26.6 KG/M2 | WEIGHT: 150.13 LBS | OXYGEN SATURATION: 95 % | HEART RATE: 71 BPM | DIASTOLIC BLOOD PRESSURE: 74 MMHG | SYSTOLIC BLOOD PRESSURE: 164 MMHG

## 2022-10-17 DIAGNOSIS — Z78.0 ASYMPTOMATIC POSTMENOPAUSAL STATE: ICD-10-CM

## 2022-10-17 DIAGNOSIS — C44.211 BASAL CELL CARCINOMA (BCC) OF SKIN OF EAR, UNSPECIFIED LATERALITY: ICD-10-CM

## 2022-10-17 DIAGNOSIS — I10 ESSENTIAL HYPERTENSION: ICD-10-CM

## 2022-10-17 DIAGNOSIS — M06.9 RHEUMATOID ARTHRITIS INVOLVING BOTH HANDS, UNSPECIFIED WHETHER RHEUMATOID FACTOR PRESENT: ICD-10-CM

## 2022-10-17 DIAGNOSIS — Z12.11 COLON CANCER SCREENING: Primary | ICD-10-CM

## 2022-10-17 DIAGNOSIS — Z23 INFLUENZA VACCINE NEEDED: ICD-10-CM

## 2022-10-17 DIAGNOSIS — Z79.899 IMMUNODEFICIENCY DUE TO TREATMENT WITH IMMUNOSUPPRESSIVE MEDICATION: ICD-10-CM

## 2022-10-17 DIAGNOSIS — R91.1 PULMONARY NODULE: ICD-10-CM

## 2022-10-17 DIAGNOSIS — D84.821 IMMUNODEFICIENCY DUE TO TREATMENT WITH IMMUNOSUPPRESSIVE MEDICATION: ICD-10-CM

## 2022-10-17 LAB
ALBUMIN SERPL BCP-MCNC: 4.4 G/DL (ref 3.5–5.2)
ALP SERPL-CCNC: 49 U/L (ref 55–135)
ALT SERPL W/O P-5'-P-CCNC: 56 U/L (ref 10–44)
ANION GAP SERPL CALC-SCNC: 11 MMOL/L (ref 8–16)
AST SERPL-CCNC: 45 U/L (ref 10–40)
BASOPHILS # BLD AUTO: 0.02 K/UL (ref 0–0.2)
BASOPHILS NFR BLD: 0.3 % (ref 0–1.9)
BILIRUB SERPL-MCNC: 0.5 MG/DL (ref 0.1–1)
BUN SERPL-MCNC: 18 MG/DL (ref 8–23)
CALCIUM SERPL-MCNC: 10.1 MG/DL (ref 8.7–10.5)
CHLORIDE SERPL-SCNC: 105 MMOL/L (ref 95–110)
CO2 SERPL-SCNC: 24 MMOL/L (ref 23–29)
CREAT SERPL-MCNC: 0.8 MG/DL (ref 0.5–1.4)
DIFFERENTIAL METHOD: ABNORMAL
EOSINOPHIL # BLD AUTO: 0.1 K/UL (ref 0–0.5)
EOSINOPHIL NFR BLD: 1 % (ref 0–8)
ERYTHROCYTE [DISTWIDTH] IN BLOOD BY AUTOMATED COUNT: 13 % (ref 11.5–14.5)
EST. GFR  (NO RACE VARIABLE): >60 ML/MIN/1.73 M^2
GLUCOSE SERPL-MCNC: 93 MG/DL (ref 70–110)
HCT VFR BLD AUTO: 36.2 % (ref 37–48.5)
HGB BLD-MCNC: 12.1 G/DL (ref 12–16)
IMM GRANULOCYTES # BLD AUTO: 0.01 K/UL (ref 0–0.04)
IMM GRANULOCYTES NFR BLD AUTO: 0.2 % (ref 0–0.5)
LYMPHOCYTES # BLD AUTO: 3 K/UL (ref 1–4.8)
LYMPHOCYTES NFR BLD: 49.7 % (ref 18–48)
MCH RBC QN AUTO: 33.2 PG (ref 27–31)
MCHC RBC AUTO-ENTMCNC: 33.4 G/DL (ref 32–36)
MCV RBC AUTO: 99 FL (ref 82–98)
MONOCYTES # BLD AUTO: 0.7 K/UL (ref 0.3–1)
MONOCYTES NFR BLD: 11.1 % (ref 4–15)
NEUTROPHILS # BLD AUTO: 2.3 K/UL (ref 1.8–7.7)
NEUTROPHILS NFR BLD: 37.7 % (ref 38–73)
NRBC BLD-RTO: 0 /100 WBC
PLATELET # BLD AUTO: 297 K/UL (ref 150–450)
PMV BLD AUTO: 9.2 FL (ref 9.2–12.9)
POTASSIUM SERPL-SCNC: 4.5 MMOL/L (ref 3.5–5.1)
PROT SERPL-MCNC: 7.7 G/DL (ref 6–8.4)
RBC # BLD AUTO: 3.65 M/UL (ref 4–5.4)
SODIUM SERPL-SCNC: 140 MMOL/L (ref 136–145)
WBC # BLD AUTO: 6.04 K/UL (ref 3.9–12.7)

## 2022-10-17 PROCEDURE — 3077F PR MOST RECENT SYSTOLIC BLOOD PRESSURE >= 140 MM HG: ICD-10-PCS | Mod: CPTII,S$GLB,, | Performed by: INTERNAL MEDICINE

## 2022-10-17 PROCEDURE — 3078F DIAST BP <80 MM HG: CPT | Mod: CPTII,S$GLB,, | Performed by: INTERNAL MEDICINE

## 2022-10-17 PROCEDURE — 4010F ACE/ARB THERAPY RXD/TAKEN: CPT | Mod: CPTII,S$GLB,, | Performed by: INTERNAL MEDICINE

## 2022-10-17 PROCEDURE — 1160F PR REVIEW ALL MEDS BY PRESCRIBER/CLIN PHARMACIST DOCUMENTED: ICD-10-PCS | Mod: CPTII,S$GLB,, | Performed by: INTERNAL MEDICINE

## 2022-10-17 PROCEDURE — 3288F FALL RISK ASSESSMENT DOCD: CPT | Mod: CPTII,S$GLB,, | Performed by: INTERNAL MEDICINE

## 2022-10-17 PROCEDURE — 3077F SYST BP >= 140 MM HG: CPT | Mod: CPTII,S$GLB,, | Performed by: INTERNAL MEDICINE

## 2022-10-17 PROCEDURE — 99999 PR PBB SHADOW E&M-EST. PATIENT-LVL V: ICD-10-PCS | Mod: PBBFAC,,, | Performed by: INTERNAL MEDICINE

## 2022-10-17 PROCEDURE — 3044F PR MOST RECENT HEMOGLOBIN A1C LEVEL <7.0%: ICD-10-PCS | Mod: CPTII,S$GLB,, | Performed by: INTERNAL MEDICINE

## 2022-10-17 PROCEDURE — 1159F PR MEDICATION LIST DOCUMENTED IN MEDICAL RECORD: ICD-10-PCS | Mod: CPTII,S$GLB,, | Performed by: INTERNAL MEDICINE

## 2022-10-17 PROCEDURE — 1159F MED LIST DOCD IN RCRD: CPT | Mod: CPTII,S$GLB,, | Performed by: INTERNAL MEDICINE

## 2022-10-17 PROCEDURE — 1126F AMNT PAIN NOTED NONE PRSNT: CPT | Mod: CPTII,S$GLB,, | Performed by: INTERNAL MEDICINE

## 2022-10-17 PROCEDURE — 99999 PR PBB SHADOW E&M-EST. PATIENT-LVL V: CPT | Mod: PBBFAC,,, | Performed by: INTERNAL MEDICINE

## 2022-10-17 PROCEDURE — 80053 COMPREHEN METABOLIC PANEL: CPT | Performed by: INTERNAL MEDICINE

## 2022-10-17 PROCEDURE — 3044F HG A1C LEVEL LT 7.0%: CPT | Mod: CPTII,S$GLB,, | Performed by: INTERNAL MEDICINE

## 2022-10-17 PROCEDURE — 1101F PR PT FALLS ASSESS DOC 0-1 FALLS W/OUT INJ PAST YR: ICD-10-PCS | Mod: CPTII,S$GLB,, | Performed by: INTERNAL MEDICINE

## 2022-10-17 PROCEDURE — 99214 OFFICE O/P EST MOD 30 MIN: CPT | Mod: S$GLB,,, | Performed by: INTERNAL MEDICINE

## 2022-10-17 PROCEDURE — 85025 COMPLETE CBC W/AUTO DIFF WBC: CPT | Performed by: INTERNAL MEDICINE

## 2022-10-17 PROCEDURE — 3288F PR FALLS RISK ASSESSMENT DOCUMENTED: ICD-10-PCS | Mod: CPTII,S$GLB,, | Performed by: INTERNAL MEDICINE

## 2022-10-17 PROCEDURE — 4010F PR ACE/ARB THEARPY RXD/TAKEN: ICD-10-PCS | Mod: CPTII,S$GLB,, | Performed by: INTERNAL MEDICINE

## 2022-10-17 PROCEDURE — 1101F PT FALLS ASSESS-DOCD LE1/YR: CPT | Mod: CPTII,S$GLB,, | Performed by: INTERNAL MEDICINE

## 2022-10-17 PROCEDURE — 3078F PR MOST RECENT DIASTOLIC BLOOD PRESSURE < 80 MM HG: ICD-10-PCS | Mod: CPTII,S$GLB,, | Performed by: INTERNAL MEDICINE

## 2022-10-17 PROCEDURE — 99214 PR OFFICE/OUTPT VISIT, EST, LEVL IV, 30-39 MIN: ICD-10-PCS | Mod: S$GLB,,, | Performed by: INTERNAL MEDICINE

## 2022-10-17 PROCEDURE — 1160F RVW MEDS BY RX/DR IN RCRD: CPT | Mod: CPTII,S$GLB,, | Performed by: INTERNAL MEDICINE

## 2022-10-17 PROCEDURE — 36415 COLL VENOUS BLD VENIPUNCTURE: CPT | Mod: PN | Performed by: INTERNAL MEDICINE

## 2022-10-17 PROCEDURE — 1126F PR PAIN SEVERITY QUANTIFIED, NO PAIN PRESENT: ICD-10-PCS | Mod: CPTII,S$GLB,, | Performed by: INTERNAL MEDICINE

## 2022-10-17 RX ORDER — LOSARTAN POTASSIUM 25 MG/1
50 TABLET ORAL DAILY
Qty: 180 TABLET | Refills: 1 | Status: SHIPPED | OUTPATIENT
Start: 2022-10-17 | End: 2023-05-03 | Stop reason: SDUPTHER

## 2022-10-17 NOTE — PROGRESS NOTES
"HISTORY OF PRESENT ILLNESS:  Lula De Leon is a 66 y.o. female who presents to the clinic today for Follow-up (6 months)    Last seen by me 4/2022.    BCC right ear  Seeing Dr. Ricks for Mohs procedure tomorrow.    Congenital dilation of brachiocephalic artery  Seen by vascular surgery 6/2022.  Noncontrast CT showed "Mild fusiform enlargement of the brachiocephalic artery, measuring 1.5 cm in diameter.  The ascending aorta is at the upper limits of normal in size...."    HTN  On losartan.    Hyperlipidemia  Managed with statin.  The 10-year ASCVD risk score (Linda EM, et al., 2019) is: 10.3%    Values used to calculate the score:      Age: 66 years      Sex: Female      Is Non- : No      Diabetic: No      Tobacco smoker: No      Systolic Blood Pressure: 162 mmHg      Is BP treated: Yes      HDL Cholesterol: 55 mg/dL      Total Cholesterol: 156 mg/dL    Rheumatoid arthritis  Seen by Dr. Hooks.  Managed with sulfasalazine, MTX, Plaquenil.  Prescribed Rinvoq at recent visit with improvement.    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    CT Neck, chest without contrast (8/15/22)  FINDINGS:  NECK:  The pharyngeal mucosal space has a normal appearance. Structures of the hypopharynx and larynx are symmetric and appear normal.  The parotid, submandibular, and sublingual glands and spaces appear normal. , parapharyngeal, and retropharyngeal spaces appear normal.  No cervical lymphadenopathy is identified.  Mucous retention cyst in the right maxillary sinus.  Partial opacification of the mastoid air cells.  Prevertebral soft tissues are unremarkable.  The visualized segments of the orbits, globes, and intracranial compartment are unremarkable.     CHEST:  LINES/TUBES:  None.  SOFT TISSUES: No axillary or subpectoral lymphadenopathy. The thyroid gland is unremarkable.  Surgical clips in the right breast.  HEART AND " MEDIASTINUM: No mediastinal or hilar lymphadenopathy. Heart and pericardium are within normal limits.  PLEURA: No pleural effusion or pneumothorax.  LUNGS AND AIRWAYS: Airways are patent. No focal consolidation.  There are fine subpleural reticular opacities throughout both lungs, with areas of architectural distortion.  Few punctate pulmonary micro nodules, for example a 3 mm nodule in the left lower lobe on 604:135.  ABDOMEN: Moderate hiatal hernia.  No other abnormality in the visualized upper abdomen.  BONES: Osteopenia and degenerative changes.  No acute fractures. No suspicious lytic or sclerotic lesions.  VESSELS:     Scattered vascular calcifications.  Left-sided 3 vessel aortic arch.  The ascending aorta is at the upper limits of normal in size, measuring 3.5 cm in diameter.  The brachiocephalic artery measures 1.5 cm in diameter.     Impression:  Mild fusiform enlargement of the brachiocephalic artery, measuring 1.5 cm in diameter.  The ascending aorta is at the upper limits of normal in size.  Fine subpleural reticular opacities throughout both lungs with areas of architectural distortion, suggesting interstitial lung disease.  Few punctate pulmonary micro nodules.  If the patient is at high risk for lung cancer, consider follow-up chest CT in 12 months.     Moderate hiatal hernia.  PAST MEDICAL HISTORY:  Past Medical History:   Diagnosis Date    Rheumatoid arthritis        PAST SURGICAL HISTORY:  Past Surgical History:   Procedure Laterality Date    BREAST LUMPECTOMY Right     exc biopsy    BREAST SURGERY      lobectomy 2017 - [facility]    CARPAL TUNNEL RELEASE Right     SINUS SURGERY  2017    Dr. Christel FLORES and Dr. Chilel       SOCIAL HISTORY:  Social History     Socioeconomic History    Marital status:    Occupational History    Occupation: retired - used to make prosthesis and dental implants   Tobacco Use    Smoking status: Never   Substance and Sexual Activity    Alcohol use:  Yes       FAMILY HISTORY:  Family History   Problem Relation Age of Onset    Arthritis Mother     Aneurysm Mother 64    Heart attack Father 70    Epilepsy Sister        ALLERGIES AND MEDICATIONS: updated and reviewed.  Review of patient's allergies indicates:  No Known Allergies  Medication List with Changes/Refills   Current Medications    ASCORBIC ACID, VITAMIN C, (VITAMIN C) 500 MG TABLET    Take 500 mg by mouth.    ATORVASTATIN (LIPITOR) 20 MG TABLET    Take 1 tablet (20 mg total) by mouth once daily.    BETA CAROTENE 25230 UNIT CAPSULE    Take 25,000 Units by mouth.    CALCIUM CARBONATE (OS-ONEIDA) 600 MG CALCIUM (1,500 MG) TAB    Take 600 mg by mouth.    CETIRIZINE (ZYRTEC) 10 MG TABLET    Take 10 mg by mouth.    CYANOCOBALAMIN (VITAMIN B-12) 1000 MCG TABLET    Take 1,000 mcg by mouth.    DICLOFENAC SODIUM (VOLTAREN) 1 % GEL    Apply 2 g topically 4 (four) times daily.    FAWAD PRIM-LINOLEIC-GAMOLENIC AC (PRIMROSE OIL) 1,000 MG CAP    Take by mouth.    FLAXSEED OIL OIL    800 mg by Other route.    FOLIC ACID (FOLVITE) 1 MG TABLET    Take 1 tablet (1 mg total) by mouth once daily.    HYDROXYCHLOROQUINE (PLAQUENIL) 200 MG TABLET    Take 1 tablet (200 mg total) by mouth once daily.    IRON BISGLY,PS-FA-B-C#12-SUCC 65 MG-65 MG -1,000 MCG (24) TAB    Take by mouth.    LOSARTAN (COZAAR) 25 MG TABLET    Take 1 tablet (25 mg total) by mouth once daily.    MAGNESIUM CARBONATE ORAL    Take 600 mg by mouth.    METHOTREXATE 2.5 MG TAB    Take 6 tablets (15 mg total) by mouth every 7 days.    OMEGA-3 FATTY ACIDS/FISH OIL (FISH OIL-OMEGA-3 FATTY ACIDS) 300-1,000 MG CAPSULE    Take 2 g by mouth.    PANTOPRAZOLE (PROTONIX) 40 MG TABLET    Take 1 tablet (40 mg total) by mouth once daily.    SULFASALAZINE (AZULFIDINE) 500 MG TAB    Take 1 tablet (500 mg total) by mouth 2 (two) times a day.    UPADACITINIB (RINVOQ) 15 MG 24 HR TABLET    Take 1 tablet (15 mg total) by mouth once daily.    VITAMIN E 400 UNIT CAPSULE    Take 400 Units  by mouth.    ZINC GLUCONATE 50 MG TABLET    Take 50 mg by mouth.          CARE TEAM:  Patient Care Team:  Sylvain Haywood MD as PCP - General (Internal Medicine)  Ramesh Acuña MA as Care Coordinator  Eliel Rodrigez MD as Consulting Physician (Ophthalmology)  [facility]         REVIEW OF SYSTEMS:  Review of Systems   Constitutional:  Negative for chills and fever.   HENT:  Negative for congestion and postnasal drip.    Eyes:  Negative for photophobia and visual disturbance.   Respiratory:  Negative for cough and shortness of breath.    Cardiovascular:  Negative for chest pain and palpitations.   Gastrointestinal:  Negative for nausea and vomiting.   Genitourinary:  Negative for dysuria and frequency.   Musculoskeletal:  Negative for arthralgias and back pain.   Neurological:  Negative for light-headedness and headaches.   Psychiatric/Behavioral:  Negative for dysphoric mood. The patient is not nervous/anxious.        PHYSICAL EXAM:   Vitals:    10/17/22 1525   BP: (!) 164/74   Pulse:    Temp:              Body mass index is 27.02 kg/m².     General appearance - alert, well appearing, and in no distress and oriented to person, place, and time  Mental status - normal mood, behavior, speech, dress, motor activity, and thought processes  Eyes - sclera anicteric, left eye normal, right eye normal  Chest - clear to auscultation, no wheezes, rales or rhonchi, symmetric air entry  Heart - normal rate, regular rhythm, normal S1, S2, no murmurs, rubs, clicks or gallops  Neurological - alert, oriented, normal speech, no focal findings or movement disorder noted  Extremities - peripheral pulses normal, no pedal edema, no clubbing or cyanosis      ASSESSMENT AND PLAN:  Colon cancer screening        - Patient was counseled for available testing for colon cancer screening but declined to participate at this time.  She voiced understanding of the risk of missing detection of polyps  or other indicators for development of colon cancer.    Pulmonary nodule  -     CT Chest Without Contrast; Future; Expected date: 10/17/2022    Asymptomatic postmenopausal state        - Patient declined to schedule DX.    Influenza vaccine needed       - Declined by patient.    Essential hypertension  -     losartan (COZAAR) 25 MG tablet; Take 2 tablets (50 mg total) by mouth once daily.  Dispense: 180 tablet; Refill: 1  - Increased dose of losartan and follow up for nurse visit with BP log daily.    Basal cell carcinoma (BCC) of skin of ear, unspecified laterality        - Follow up dermatology.        Follow up 6 months or sooner as needed.

## 2022-10-17 NOTE — TELEPHONE ENCOUNTER
Appeal approved from 10/13/22 to 10/13/2023.     Benefits Investigation -  Insurance name: [employer] Part D   Copay: $1663.87   LIS LVL: n/a    Forwarding to FA.

## 2022-10-18 ENCOUNTER — PROCEDURE VISIT (OUTPATIENT)
Dept: DERMATOLOGY | Facility: CLINIC | Age: 67
End: 2022-10-18
Payer: MEDICARE

## 2022-10-18 VITALS
SYSTOLIC BLOOD PRESSURE: 164 MMHG | BODY MASS INDEX: 26.58 KG/M2 | DIASTOLIC BLOOD PRESSURE: 90 MMHG | WEIGHT: 150 LBS | HEIGHT: 63 IN | HEART RATE: 72 BPM

## 2022-10-18 DIAGNOSIS — C44.212 BASAL CELL CARCINOMA (BCC) OF SKIN OF RIGHT EAR: Primary | ICD-10-CM

## 2022-10-18 PROCEDURE — 17312 MOHS ADDL STAGE: CPT | Mod: S$GLB,,, | Performed by: DERMATOLOGY

## 2022-10-18 PROCEDURE — 99499 UNLISTED E&M SERVICE: CPT | Mod: S$GLB,,, | Performed by: DERMATOLOGY

## 2022-10-18 PROCEDURE — 99499 NO LOS: ICD-10-PCS | Mod: S$GLB,,, | Performed by: DERMATOLOGY

## 2022-10-18 PROCEDURE — 17312: ICD-10-PCS | Mod: S$GLB,,, | Performed by: DERMATOLOGY

## 2022-10-18 PROCEDURE — 17311 MOHS 1 STAGE H/N/HF/G: CPT | Mod: S$GLB,,, | Performed by: DERMATOLOGY

## 2022-10-18 PROCEDURE — 17311: ICD-10-PCS | Mod: S$GLB,,, | Performed by: DERMATOLOGY

## 2022-10-18 NOTE — PROGRESS NOTES
PROCEDURE: Mohs' Micrographic Surgery    INDICATION: Location in mask areas of face including central face, nose, eyelids, eyebrows, lips, chin, preauricular, temple, and ear. Size > 1.0 cm on face. Biopsy-proven skin cancer of cosmetically and functionally important areas, including head, neck, genital, hand, foot, or areas known for having difficulty in healing, such as the lower anterior legs. Tumors with aggressive clinical behavior (rapidly growing, greater than 1 cm in diameter). Tumor with ill-defined borders.    REFERRING PROVIDER: Maryanne Natarajan M.D.    CASE NUMBER:     ANESTHETIC: 1.5 cc 0.5% Lidocaine with Epi 1:200,000 mixed 1:1 with 0.5% Bupivacaine    SURGICAL PREP: Betadine    SURGEON: Yolanda Ricks MD    ASSISTANTS: Anne Franklin PA-C and Iman Sabillon, Surg Tech    PREOPERATIVE DIAGNOSIS: basal cell carcinoma    POSTOPERATIVE DIAGNOSIS: basal cell carcinoma- nodular    PATHOLOGIC DIAGNOSIS: basal cell carcinoma- nodular    HISTOLOGY OF SPECIMENS IN FIRST STAGE:   Debulking tumor confirms nodular basal cell carcinoma.  Tumor Type: Tumor seen. Nodular basal cell carcinoma: Nodular tumor in dermis composed of basaloid cells exhibiting peripheral palisading and retraction artifact.   Depth of Invasion: epidermis and dermis  Perineural Invasion: No    HISTOLOGY OF SPECIMENS IN SUBSEQUENT STAGES:  Tumor Type: Epidermal margin missing.   Depth of Invasion: epidermis  Perineural Invasion: No    STAGES OF MOHS' SURGERY PERFORMED: 3    TUMOR-FREE PLANE ACHIEVED: Yes    HEMOSTASIS: electrocoagulation     SPECIMENS: 5 (3 in stage A, 1 in stage B, and 1 in stage C)    LOCATION: right ear (scaphoid fossa). Location verified with Dr. Natarajan's clinical photograph. Patient also verified location with hand held mirror.    INITIAL LESION SIZE: 0.8 x 1.0 cm    FINAL DEFECT SIZE: 1.4 x 1.7 cm    WOUND REPAIR/DISPOSITION: When the tumor was completely removed, repair options were discussed with the patient  "including full thickness skin graft and it was decided by the patient to let the wound heal by second intention. The patient tolerated the procedure well and will consider delayed reconstruction or repair if necessary.    The area was cleaned and dressed appropriately, and the patient was given wound care instructions, as well as an appointment for follow-up evaluation in one month.    Vitals:    10/18/22 0748 10/18/22 1103   BP: (!) 170/77 (!) 164/90   BP Location: Right arm    Patient Position: Sitting    BP Method: Medium (Automatic)    Pulse: 73 72   Weight: 68 kg (150 lb)    Height: 5' 2.5" (1.588 m)          "

## 2022-10-18 NOTE — TELEPHONE ENCOUNTER
Call attempt to patient regarding Rinvoq prescription we received. Calling to inform patient Rinvoq has been approved through insurance with a high copay. No grants are currently available at this time but am able to apply patient for  assistance. WILL NEED HH SIZE, ANNUAL INCOME, & HOW PT WANTS TO RECEIVE THEIR PORTION OF APPLICATION. Will continue to follow up.     NO ANSWER/ UNABLE TO LVM.

## 2022-10-21 NOTE — TELEPHONE ENCOUNTER
Incoming call from patients  returning call regarding Rinvoq approval and FA. Informed  Rinvoq is approved by insurance with a $1663.87 copay and discussed FA options. No grants are currently available at this time but am able to apply patient for  assistance.  provided consent, HH size and annual income, and would application sent via email. Confirmed email address on file. Routing message to [place] Mejia to follow up accordingly.     requested that we contact him at [phone] regarding Rinvoq FA.

## 2022-10-21 NOTE — TELEPHONE ENCOUNTER
Patient portion of Rinvoq PAP sent to email address on file. Provider portion faxed to [phone] and staff message sent to [facility]. Follow up with patient's  at [phone] for future purposes.

## 2022-10-24 ENCOUNTER — PATIENT OUTREACH (OUTPATIENT)
Dept: ADMINISTRATIVE | Facility: HOSPITAL | Age: 67
End: 2022-10-24
Payer: MEDICARE

## 2022-10-24 ENCOUNTER — PATIENT MESSAGE (OUTPATIENT)
Dept: ADMINISTRATIVE | Facility: HOSPITAL | Age: 67
End: 2022-10-24
Payer: MEDICARE

## 2022-10-24 NOTE — PROGRESS NOTES
Reached out to pt in regards to Cologuard mailed to pt, pt unavailable,no voicemail.  Immunization's updated.

## 2022-10-31 ENCOUNTER — CLINICAL SUPPORT (OUTPATIENT)
Dept: FAMILY MEDICINE | Facility: CLINIC | Age: 67
End: 2022-10-31
Payer: MEDICARE

## 2022-10-31 VITALS — SYSTOLIC BLOOD PRESSURE: 130 MMHG | HEART RATE: 70 BPM | DIASTOLIC BLOOD PRESSURE: 70 MMHG

## 2022-10-31 DIAGNOSIS — R03.0 ELEVATED BLOOD PRESSURE READING: Primary | ICD-10-CM

## 2022-10-31 PROCEDURE — 99999 PR PBB SHADOW E&M-EST. PATIENT-LVL II: ICD-10-PCS | Mod: PBBFAC,,,

## 2022-10-31 PROCEDURE — 99999 PR PBB SHADOW E&M-EST. PATIENT-LVL II: CPT | Mod: PBBFAC,,,

## 2022-10-31 PROCEDURE — 99499 NO LOS: ICD-10-PCS | Mod: S$GLB,,, | Performed by: INTERNAL MEDICINE

## 2022-10-31 PROCEDURE — 99499 UNLISTED E&M SERVICE: CPT | Mod: S$GLB,,, | Performed by: INTERNAL MEDICINE

## 2022-10-31 NOTE — PROGRESS NOTES
Sirena De Leon 66 y.o. female is here today for Blood Pressure check.   History of HTN yes.    Review of patient's allergies indicates:  No Known Allergies  Creatinine   Date Value Ref Range Status   10/17/2022 0.8 0.5 - 1.4 mg/dL Final     Sodium   Date Value Ref Range Status   10/17/2022 140 136 - 145 mmol/L Final     Potassium   Date Value Ref Range Status   10/17/2022 4.5 3.5 - 5.1 mmol/L Final   ]  Patient verifies taking blood pressure medications on a regular basis at the same time of the day.     Current Outpatient Medications:     ascorbic acid, vitamin C, (VITAMIN C) 500 MG tablet, Take 500 mg by mouth., Disp: , Rfl:     atorvastatin (LIPITOR) 20 MG tablet, Take 1 tablet (20 mg total) by mouth once daily., Disp: 90 tablet, Rfl: 1    beta carotene 81454 UNIT capsule, Take 25,000 Units by mouth., Disp: , Rfl:     calcium carbonate (OS-ONEIDA) 600 mg calcium (1,500 mg) Tab, Take 600 mg by mouth., Disp: , Rfl:     cetirizine (ZYRTEC) 10 MG tablet, Take 10 mg by mouth., Disp: , Rfl:     cyanocobalamin (VITAMIN B-12) 1000 MCG tablet, Take 1,000 mcg by mouth., Disp: , Rfl:     diclofenac sodium (VOLTAREN) 1 % Gel, Apply 2 g topically 4 (four) times daily., Disp: 1 Tube, Rfl: 3    richa prim-linoleic-gamolenic ac (PRIMROSE OIL) 1,000 mg Cap, Take by mouth., Disp: , Rfl:     flaxseed oil Oil, 800 mg by Other route., Disp: , Rfl:     folic acid (FOLVITE) 1 MG tablet, Take 1 tablet (1 mg total) by mouth once daily., Disp: 90 tablet, Rfl: 1    hydrOXYchloroQUINE (PLAQUENIL) 200 mg tablet, Take 1 tablet (200 mg total) by mouth once daily., Disp: 90 tablet, Rfl: 1    iron bisgly,ps-FA-B-C#12-succ 65 mg-65 mg -1,000 mcg (24) Tab, Take by mouth., Disp: , Rfl:     losartan (COZAAR) 25 MG tablet, Take 2 tablets (50 mg total) by mouth once daily., Disp: 180 tablet, Rfl: 1    MAGNESIUM CARBONATE ORAL, Take 600 mg by mouth., Disp: , Rfl:     methotrexate 2.5 MG Tab, Take 6 tablets (15 mg total) by mouth every 7 days., Disp: 84  tablet, Rfl: 1    omega-3 fatty acids/fish oil (FISH OIL-OMEGA-3 FATTY ACIDS) 300-1,000 mg capsule, Take 2 g by mouth., Disp: , Rfl:     pantoprazole (PROTONIX) 40 MG tablet, Take 1 tablet (40 mg total) by mouth once daily., Disp: 90 tablet, Rfl: 2    sulfaSALAzine (AZULFIDINE) 500 mg Tab, Take 1 tablet (500 mg total) by mouth 2 (two) times a day., Disp: 180 tablet, Rfl: 1    upadacitinib (RINVOQ) 15 mg 24 hr tablet, Take 1 tablet (15 mg total) by mouth once daily., Disp: 30 tablet, Rfl: 11    vitamin E 400 UNIT capsule, Take 400 Units by mouth., Disp: , Rfl:     zinc gluconate 50 mg tablet, Take 50 mg by mouth., Disp: , Rfl:   Does patient have record of home blood pressure readings yes. Readings have been averaging 120-160/60-80.   Last dose of blood pressure medication was taken at 10/31/2022.  Patient is asymptomatic.   Complains of n/a.    BP: (!) 160/80 , Pulse: 65 .    Blood pressure reading after 15 minutes was 130/70, Pulse 70.  Dr. Haywood notified.

## 2022-11-01 NOTE — TELEPHONE ENCOUNTER
Outgoing call to Sabrina Garzon. Representative Earl stated patient does not qualify for Rinvoq PAP based on income. She can appeal this decision with OOP expense report. Outgoing call to patient's  to inform him of this. Patient's  verbalized understanding and expense report document has been sent to e-mail address on file.

## 2022-11-03 NOTE — TELEPHONE ENCOUNTER
Outgoing call to [employer] Assist. Rep Anna stated OOP expense form has been received and application is processing. May take 48 hours for decision.

## 2022-11-07 ENCOUNTER — PATIENT MESSAGE (OUTPATIENT)
Dept: FAMILY MEDICINE | Facility: CLINIC | Age: 67
End: 2022-11-07

## 2022-11-07 ENCOUNTER — OFFICE VISIT (OUTPATIENT)
Dept: FAMILY MEDICINE | Facility: CLINIC | Age: 67
End: 2022-11-07
Payer: MEDICARE

## 2022-11-07 VITALS
SYSTOLIC BLOOD PRESSURE: 136 MMHG | HEIGHT: 63 IN | TEMPERATURE: 98 F | WEIGHT: 151 LBS | OXYGEN SATURATION: 96 % | HEART RATE: 97 BPM | DIASTOLIC BLOOD PRESSURE: 68 MMHG | BODY MASS INDEX: 26.75 KG/M2

## 2022-11-07 DIAGNOSIS — J06.9 UPPER RESPIRATORY TRACT INFECTION, UNSPECIFIED TYPE: ICD-10-CM

## 2022-11-07 DIAGNOSIS — J10.1 INFLUENZA A: Primary | ICD-10-CM

## 2022-11-07 LAB
CTP QC/QA: YES
POC MOLECULAR INFLUENZA A AGN: POSITIVE
POC MOLECULAR INFLUENZA B AGN: NEGATIVE

## 2022-11-07 PROCEDURE — 3288F PR FALLS RISK ASSESSMENT DOCUMENTED: ICD-10-PCS | Mod: CPTII,S$GLB,, | Performed by: INTERNAL MEDICINE

## 2022-11-07 PROCEDURE — 1101F PT FALLS ASSESS-DOCD LE1/YR: CPT | Mod: CPTII,S$GLB,, | Performed by: INTERNAL MEDICINE

## 2022-11-07 PROCEDURE — 4010F ACE/ARB THERAPY RXD/TAKEN: CPT | Mod: CPTII,S$GLB,, | Performed by: INTERNAL MEDICINE

## 2022-11-07 PROCEDURE — 1126F PR PAIN SEVERITY QUANTIFIED, NO PAIN PRESENT: ICD-10-PCS | Mod: CPTII,S$GLB,, | Performed by: INTERNAL MEDICINE

## 2022-11-07 PROCEDURE — 1159F PR MEDICATION LIST DOCUMENTED IN MEDICAL RECORD: ICD-10-PCS | Mod: CPTII,S$GLB,, | Performed by: INTERNAL MEDICINE

## 2022-11-07 PROCEDURE — 99999 PR PBB SHADOW E&M-EST. PATIENT-LVL V: ICD-10-PCS | Mod: PBBFAC,,, | Performed by: INTERNAL MEDICINE

## 2022-11-07 PROCEDURE — 87502 INFLUENZA DNA AMP PROBE: CPT | Mod: QW,S$GLB,, | Performed by: INTERNAL MEDICINE

## 2022-11-07 PROCEDURE — 1160F RVW MEDS BY RX/DR IN RCRD: CPT | Mod: CPTII,S$GLB,, | Performed by: INTERNAL MEDICINE

## 2022-11-07 PROCEDURE — 1160F PR REVIEW ALL MEDS BY PRESCRIBER/CLIN PHARMACIST DOCUMENTED: ICD-10-PCS | Mod: CPTII,S$GLB,, | Performed by: INTERNAL MEDICINE

## 2022-11-07 PROCEDURE — 3078F PR MOST RECENT DIASTOLIC BLOOD PRESSURE < 80 MM HG: ICD-10-PCS | Mod: CPTII,S$GLB,, | Performed by: INTERNAL MEDICINE

## 2022-11-07 PROCEDURE — 99214 OFFICE O/P EST MOD 30 MIN: CPT | Mod: S$GLB,,, | Performed by: INTERNAL MEDICINE

## 2022-11-07 PROCEDURE — 3044F PR MOST RECENT HEMOGLOBIN A1C LEVEL <7.0%: ICD-10-PCS | Mod: CPTII,S$GLB,, | Performed by: INTERNAL MEDICINE

## 2022-11-07 PROCEDURE — 3288F FALL RISK ASSESSMENT DOCD: CPT | Mod: CPTII,S$GLB,, | Performed by: INTERNAL MEDICINE

## 2022-11-07 PROCEDURE — 87502 POCT INFLUENZA A/B MOLECULAR: ICD-10-PCS | Mod: QW,S$GLB,, | Performed by: INTERNAL MEDICINE

## 2022-11-07 PROCEDURE — 3075F SYST BP GE 130 - 139MM HG: CPT | Mod: CPTII,S$GLB,, | Performed by: INTERNAL MEDICINE

## 2022-11-07 PROCEDURE — 3044F HG A1C LEVEL LT 7.0%: CPT | Mod: CPTII,S$GLB,, | Performed by: INTERNAL MEDICINE

## 2022-11-07 PROCEDURE — 3008F PR BODY MASS INDEX (BMI) DOCUMENTED: ICD-10-PCS | Mod: CPTII,S$GLB,, | Performed by: INTERNAL MEDICINE

## 2022-11-07 PROCEDURE — 3008F BODY MASS INDEX DOCD: CPT | Mod: CPTII,S$GLB,, | Performed by: INTERNAL MEDICINE

## 2022-11-07 PROCEDURE — 3078F DIAST BP <80 MM HG: CPT | Mod: CPTII,S$GLB,, | Performed by: INTERNAL MEDICINE

## 2022-11-07 PROCEDURE — 1159F MED LIST DOCD IN RCRD: CPT | Mod: CPTII,S$GLB,, | Performed by: INTERNAL MEDICINE

## 2022-11-07 PROCEDURE — 99214 PR OFFICE/OUTPT VISIT, EST, LEVL IV, 30-39 MIN: ICD-10-PCS | Mod: S$GLB,,, | Performed by: INTERNAL MEDICINE

## 2022-11-07 PROCEDURE — 1126F AMNT PAIN NOTED NONE PRSNT: CPT | Mod: CPTII,S$GLB,, | Performed by: INTERNAL MEDICINE

## 2022-11-07 PROCEDURE — 99999 PR PBB SHADOW E&M-EST. PATIENT-LVL V: CPT | Mod: PBBFAC,,, | Performed by: INTERNAL MEDICINE

## 2022-11-07 PROCEDURE — 3075F PR MOST RECENT SYSTOLIC BLOOD PRESS GE 130-139MM HG: ICD-10-PCS | Mod: CPTII,S$GLB,, | Performed by: INTERNAL MEDICINE

## 2022-11-07 PROCEDURE — 1101F PR PT FALLS ASSESS DOC 0-1 FALLS W/OUT INJ PAST YR: ICD-10-PCS | Mod: CPTII,S$GLB,, | Performed by: INTERNAL MEDICINE

## 2022-11-07 PROCEDURE — 4010F PR ACE/ARB THEARPY RXD/TAKEN: ICD-10-PCS | Mod: CPTII,S$GLB,, | Performed by: INTERNAL MEDICINE

## 2022-11-07 RX ORDER — PROMETHAZINE HYDROCHLORIDE AND CODEINE PHOSPHATE 6.25; 1 MG/5ML; MG/5ML
5 SOLUTION ORAL NIGHTLY PRN
Qty: 80 ML | Refills: 0 | Status: SHIPPED | OUTPATIENT
Start: 2022-11-07 | End: 2022-11-07 | Stop reason: SDUPTHER

## 2022-11-07 RX ORDER — OSELTAMIVIR PHOSPHATE 75 MG/1
75 CAPSULE ORAL 2 TIMES DAILY
Qty: 10 CAPSULE | Refills: 0 | Status: SHIPPED | OUTPATIENT
Start: 2022-11-07 | End: 2022-11-12

## 2022-11-07 RX ORDER — BENZONATATE 200 MG/1
200 CAPSULE ORAL 3 TIMES DAILY PRN
Qty: 20 CAPSULE | Refills: 0 | Status: SHIPPED | OUTPATIENT
Start: 2022-11-07 | End: 2022-11-17

## 2022-11-07 RX ORDER — ONDANSETRON 4 MG/1
4 TABLET, ORALLY DISINTEGRATING ORAL EVERY 8 HOURS PRN
Qty: 15 TABLET | Refills: 0 | Status: SHIPPED | OUTPATIENT
Start: 2022-11-07

## 2022-11-07 NOTE — PROGRESS NOTES
HISTORY OF PRESENT ILLNESS:  Lula De Leon is a 66 y.o. female who presents to the clinic today for Sinus Problem (Started yesterday), Headache, and Nausea    Last seen by me 10/17/22.    Since last night: HA, cough, sinus pressure, nausea without vomiting.  No fever, chills, body aches.  Grandson with flu recently.  Home remedies: DayQuil.  On daily antihistamine.  Sometimes uses Nasacort.    PAST MEDICAL HISTORY:  Past Medical History:   Diagnosis Date    Basal cell carcinoma of right ear 10/18/2022    Rheumatoid arthritis        PAST SURGICAL HISTORY:  Past Surgical History:   Procedure Laterality Date    BREAST LUMPECTOMY Right     exc biopsy    BREAST SURGERY      lobectomy 2017 - [facility]    CARPAL TUNNEL RELEASE Right     SINUS SURGERY  2017    Dr. Christel FLORES and Dr. Chilel       SOCIAL HISTORY:  Social History     Socioeconomic History    Marital status:    Occupational History    Occupation: retired - used to make prosthesis and dental implants   Tobacco Use    Smoking status: Former     Packs/day: 1.00     Years: 6.00     Pack years: 6.00     Types: Cigarettes     Quit date:      Years since quittin.8    Smokeless tobacco: Never   Substance and Sexual Activity    Alcohol use: Yes       FAMILY HISTORY:  Family History   Problem Relation Age of Onset    Arthritis Mother     Aneurysm Mother 64    Heart attack Father 70    Epilepsy Sister        ALLERGIES AND MEDICATIONS: updated and reviewed.  Review of patient's allergies indicates:  No Known Allergies  Medication List with Changes/Refills   Current Medications    ASCORBIC ACID, VITAMIN C, (VITAMIN C) 500 MG TABLET    Take 500 mg by mouth.    ATORVASTATIN (LIPITOR) 20 MG TABLET    Take 1 tablet (20 mg total) by mouth once daily.    BETA CAROTENE 78215 UNIT CAPSULE    Take 25,000 Units by mouth.    CALCIUM CARBONATE (OS-ONEIDA) 600 MG CALCIUM (1,500 MG) TAB    Take 600 mg by mouth.    CETIRIZINE (ZYRTEC) 10 MG TABLET    Take 10 mg  by mouth.    CYANOCOBALAMIN (VITAMIN B-12) 1000 MCG TABLET    Take 1,000 mcg by mouth.    DICLOFENAC SODIUM (VOLTAREN) 1 % GEL    Apply 2 g topically 4 (four) times daily.    FAWAD PRIM-LINOLEIC-GAMOLENIC AC (PRIMROSE OIL) 1,000 MG CAP    Take by mouth.    FLAXSEED OIL OIL    800 mg by Other route.    FOLIC ACID (FOLVITE) 1 MG TABLET    Take 1 tablet (1 mg total) by mouth once daily.    HYDROXYCHLOROQUINE (PLAQUENIL) 200 MG TABLET    Take 1 tablet (200 mg total) by mouth once daily.    IRON BISGLY,PS-FA-B-C#12-SUCC 65 MG-65 MG -1,000 MCG (24) TAB    Take by mouth.    LOSARTAN (COZAAR) 25 MG TABLET    Take 2 tablets (50 mg total) by mouth once daily.    MAGNESIUM CARBONATE ORAL    Take 600 mg by mouth.    METHOTREXATE 2.5 MG TAB    Take 6 tablets (15 mg total) by mouth every 7 days.    OMEGA-3 FATTY ACIDS/FISH OIL (FISH OIL-OMEGA-3 FATTY ACIDS) 300-1,000 MG CAPSULE    Take 2 g by mouth.    PANTOPRAZOLE (PROTONIX) 40 MG TABLET    Take 1 tablet (40 mg total) by mouth once daily.    SULFASALAZINE (AZULFIDINE) 500 MG TAB    Take 1 tablet (500 mg total) by mouth 2 (two) times a day.    UPADACITINIB (RINVOQ) 15 MG 24 HR TABLET    Take 1 tablet (15 mg total) by mouth once daily.    VITAMIN E 400 UNIT CAPSULE    Take 400 Units by mouth.    ZINC GLUCONATE 50 MG TABLET    Take 50 mg by mouth.          CARE TEAM:  Patient Care Team:  Sylvain Haywood MD as PCP - General (Internal Medicine)  Ramesh Acuña MA as Care Coordinator  Eliel Rodrigez MD as Consulting Physician (Ophthalmology)  [facility]         REVIEW OF SYSTEMS:  Review of Systems   Constitutional:  Positive for fatigue. Negative for chills and fever.   HENT:  Positive for congestion and sinus pressure. Negative for ear pain and sore throat.    Eyes:  Negative for photophobia and visual disturbance.   Respiratory:  Negative for cough and shortness of breath.    Cardiovascular:  Negative for chest pain and palpitations.    Gastrointestinal:  Positive for nausea. Negative for abdominal pain and vomiting.   Genitourinary:  Negative for dysuria and frequency.   Musculoskeletal:  Negative for arthralgias and back pain.   Neurological:  Negative for light-headedness and headaches.   Psychiatric/Behavioral:  Negative for dysphoric mood.        PHYSICAL EXAM:   Vitals:    11/07/22 0829   BP: 136/68   Pulse: 97   Temp: 98.3 °F (36.8 °C)             Body mass index is 27.18 kg/m².     General appearance - alert, well appearing, and in no distress and oriented to person, place, and time  Mental status - normal mood, behavior, speech, dress, motor activity, and thought processes  Eyes - sclera anicteric, left eye normal, right eye normal  Chest - no tachypnea, retractions or cyanosis  Neurological - alert, oriented, normal speech, no focal findings or movement disorder noted  Extremities - no pedal edema, no clubbing or cyanosis      ASSESSMENT AND PLAN:  Influenza A  -     oseltamivir (TAMIFLU) 75 MG capsule; Take 1 capsule (75 mg total) by mouth 2 (two) times daily. for 5 days  Dispense: 10 capsule; Refill: 0  - Patient advised for supportive measures at home and educational material provided.    Upper respiratory tract infection, unspecified type  -     benzonatate (TESSALON) 200 MG capsule; Take 1 capsule (200 mg total) by mouth 3 (three) times daily as needed.  Dispense: 20 capsule; Refill: 0  -     promethazine-codeine 6.25-10 mg/5 ml (PHENERGAN WITH CODEINE) 6.25-10 mg/5 mL syrup; Take 5 mLs by mouth nightly as needed for Cough.  Dispense: 80 mL; Refill: 0  -     ondansetron (ZOFRAN-ODT) 4 MG TbDL; Take 1 tablet (4 mg total) by mouth every 8 (eight) hours as needed (nausea).  Dispense: 15 tablet; Refill: 0  -     POCT Influenza A/B Molecular - POSITIVE            Follow up in one week if not better or sooner as needed.

## 2022-11-07 NOTE — TELEPHONE ENCOUNTER
Outgoing call to [facility] Assist for status check. Rep Johnny stated that application is still under review.

## 2022-11-07 NOTE — TELEPHONE ENCOUNTER
----- Message from Constanza Lester sent at 11/7/2022  9:11 AM CST -----  Type: Patient Call Back    Who called: Shweta/ Sherrie     What is the request in detail:States [facility] don't have   promethazine-codeine 6.25-10 mg/5 ml (PHENERGAN WITH CODEINE) 6.25-10 mg/5 mL syrup 80 mL     Can the clinic reply by [facility]? No     Would the patient rather a call back or a response via [facility]? Call     Best call back number: Phone: [phone]  Fax: [phone]

## 2022-11-08 RX ORDER — PROMETHAZINE HYDROCHLORIDE AND CODEINE PHOSPHATE 6.25; 1 MG/5ML; MG/5ML
5 SOLUTION ORAL NIGHTLY PRN
Qty: 80 ML | Refills: 0 | Status: SHIPPED | OUTPATIENT
Start: 2022-11-08 | End: 2022-11-24

## 2022-11-09 NOTE — TELEPHONE ENCOUNTER
Outgoing call to [facility] Assist for status check. Representative stated application is still under review.

## 2022-11-11 NOTE — TELEPHONE ENCOUNTER
Patient is denied for [employer] program due to not meeting the eligibility criteria with income over the limit. Rinvoq PAP denial scanned to media. Outgoing call to patient. Spoke with  about denial and informed him that [facility] would let provider know and look into alternative therapies that may be more affordable.

## 2022-11-11 NOTE — TELEPHONE ENCOUNTER
Staff message sent to MD informing of PAP denial and requesting referral for alternative agent.

## 2022-11-15 ENCOUNTER — OFFICE VISIT (OUTPATIENT)
Dept: DERMATOLOGY | Facility: CLINIC | Age: 67
End: 2022-11-15
Payer: MEDICARE

## 2022-11-15 DIAGNOSIS — C44.212 BASAL CELL CARCINOMA (BCC) OF SKIN OF RIGHT EAR: Primary | ICD-10-CM

## 2022-11-15 PROCEDURE — 3044F PR MOST RECENT HEMOGLOBIN A1C LEVEL <7.0%: ICD-10-PCS | Mod: CPTII,S$GLB,, | Performed by: DERMATOLOGY

## 2022-11-15 PROCEDURE — 1126F AMNT PAIN NOTED NONE PRSNT: CPT | Mod: CPTII,S$GLB,, | Performed by: DERMATOLOGY

## 2022-11-15 PROCEDURE — 4010F ACE/ARB THERAPY RXD/TAKEN: CPT | Mod: CPTII,S$GLB,, | Performed by: DERMATOLOGY

## 2022-11-15 PROCEDURE — 99212 PR OFFICE/OUTPT VISIT, EST, LEVL II, 10-19 MIN: ICD-10-PCS | Mod: S$GLB,,, | Performed by: DERMATOLOGY

## 2022-11-15 PROCEDURE — 1160F PR REVIEW ALL MEDS BY PRESCRIBER/CLIN PHARMACIST DOCUMENTED: ICD-10-PCS | Mod: CPTII,S$GLB,, | Performed by: DERMATOLOGY

## 2022-11-15 PROCEDURE — 1159F PR MEDICATION LIST DOCUMENTED IN MEDICAL RECORD: ICD-10-PCS | Mod: CPTII,S$GLB,, | Performed by: DERMATOLOGY

## 2022-11-15 PROCEDURE — 99212 OFFICE O/P EST SF 10 MIN: CPT | Mod: S$GLB,,, | Performed by: DERMATOLOGY

## 2022-11-15 PROCEDURE — 99999 PR PBB SHADOW E&M-EST. PATIENT-LVL II: ICD-10-PCS | Mod: PBBFAC,,, | Performed by: DERMATOLOGY

## 2022-11-15 PROCEDURE — 1126F PR PAIN SEVERITY QUANTIFIED, NO PAIN PRESENT: ICD-10-PCS | Mod: CPTII,S$GLB,, | Performed by: DERMATOLOGY

## 2022-11-15 PROCEDURE — 99999 PR PBB SHADOW E&M-EST. PATIENT-LVL II: CPT | Mod: PBBFAC,,, | Performed by: DERMATOLOGY

## 2022-11-15 PROCEDURE — 1160F RVW MEDS BY RX/DR IN RCRD: CPT | Mod: CPTII,S$GLB,, | Performed by: DERMATOLOGY

## 2022-11-15 PROCEDURE — 3288F FALL RISK ASSESSMENT DOCD: CPT | Mod: CPTII,S$GLB,, | Performed by: DERMATOLOGY

## 2022-11-15 PROCEDURE — 4010F PR ACE/ARB THEARPY RXD/TAKEN: ICD-10-PCS | Mod: CPTII,S$GLB,, | Performed by: DERMATOLOGY

## 2022-11-15 PROCEDURE — 3044F HG A1C LEVEL LT 7.0%: CPT | Mod: CPTII,S$GLB,, | Performed by: DERMATOLOGY

## 2022-11-15 PROCEDURE — 3288F PR FALLS RISK ASSESSMENT DOCUMENTED: ICD-10-PCS | Mod: CPTII,S$GLB,, | Performed by: DERMATOLOGY

## 2022-11-15 PROCEDURE — 1101F PR PT FALLS ASSESS DOC 0-1 FALLS W/OUT INJ PAST YR: ICD-10-PCS | Mod: CPTII,S$GLB,, | Performed by: DERMATOLOGY

## 2022-11-15 PROCEDURE — 1101F PT FALLS ASSESS-DOCD LE1/YR: CPT | Mod: CPTII,S$GLB,, | Performed by: DERMATOLOGY

## 2022-11-15 PROCEDURE — 1159F MED LIST DOCD IN RCRD: CPT | Mod: CPTII,S$GLB,, | Performed by: DERMATOLOGY

## 2022-11-15 NOTE — PROGRESS NOTES
66 y.o. female patient is here for wound check after surgery.    Patient reports no problems.    WOUND PE:  The R ear wound is well healed with 100% re-epithelialization.       IMPRESSION:  Healing operative site from Mohs' surgery BCC, R ear s/p Mohs with 2nd intention healing. Postop week #4, now all healed in.    PLAN:    D/c wound care  Keep moist x 1 more week  Recommended massage tid.  Reassured patient that redness and firmness will fade with time.  Daily SPF.  Regular skin checks.    RTC:  In 3-6 months with Maryanne Natarajan M.D. for skin check or sooner if new concern arises.

## 2022-11-18 ENCOUNTER — PATIENT OUTREACH (OUTPATIENT)
Dept: ADMINISTRATIVE | Facility: HOSPITAL | Age: 67
End: 2022-11-18
Payer: MEDICARE

## 2022-11-18 NOTE — PROGRESS NOTES
Last documented BP reading normal. BP report updated. Immunization's updated.     Pt reports having hx of mono and is now having shortness of breath.  He reports difficulty breathing and swelling in throat.

## 2022-11-21 ENCOUNTER — PATIENT MESSAGE (OUTPATIENT)
Dept: FAMILY MEDICINE | Facility: CLINIC | Age: 67
End: 2022-11-21
Payer: MEDICARE

## 2022-11-21 DIAGNOSIS — E78.5 HYPERLIPIDEMIA, UNSPECIFIED HYPERLIPIDEMIA TYPE: ICD-10-CM

## 2022-11-21 DIAGNOSIS — M06.9 RHEUMATOID ARTHRITIS INVOLVING BOTH HANDS, UNSPECIFIED WHETHER RHEUMATOID FACTOR PRESENT: ICD-10-CM

## 2022-11-22 RX ORDER — ATORVASTATIN CALCIUM 20 MG/1
20 TABLET, FILM COATED ORAL DAILY
Qty: 90 TABLET | Refills: 1 | Status: SHIPPED | OUTPATIENT
Start: 2022-11-22 | End: 2023-05-31 | Stop reason: SDUPTHER

## 2022-11-22 NOTE — TELEPHONE ENCOUNTER
Refill Decision Note   Lulaana rosa De Leon  is requesting a refill authorization.  Brief Assessment and Rationale for Refill:  Approve     Medication Therapy Plan:       Medication Reconciliation Completed: No   Comments:     No Care Gaps recommended.     Note composed:3:08 PM 11/22/2022

## 2022-12-27 ENCOUNTER — OFFICE VISIT (OUTPATIENT)
Dept: VASCULAR SURGERY | Facility: CLINIC | Age: 67
End: 2022-12-27
Payer: MEDICARE

## 2022-12-27 VITALS
BODY MASS INDEX: 27.11 KG/M2 | HEIGHT: 63 IN | DIASTOLIC BLOOD PRESSURE: 88 MMHG | SYSTOLIC BLOOD PRESSURE: 132 MMHG | WEIGHT: 153 LBS

## 2022-12-27 DIAGNOSIS — Q27.39: Primary | ICD-10-CM

## 2022-12-27 PROCEDURE — 1101F PR PT FALLS ASSESS DOC 0-1 FALLS W/OUT INJ PAST YR: ICD-10-PCS | Mod: CPTII,S$GLB,, | Performed by: SURGERY

## 2022-12-27 PROCEDURE — 1126F AMNT PAIN NOTED NONE PRSNT: CPT | Mod: CPTII,S$GLB,, | Performed by: SURGERY

## 2022-12-27 PROCEDURE — 3075F SYST BP GE 130 - 139MM HG: CPT | Mod: CPTII,S$GLB,, | Performed by: SURGERY

## 2022-12-27 PROCEDURE — 3079F DIAST BP 80-89 MM HG: CPT | Mod: CPTII,S$GLB,, | Performed by: SURGERY

## 2022-12-27 PROCEDURE — 3008F BODY MASS INDEX DOCD: CPT | Mod: CPTII,S$GLB,, | Performed by: SURGERY

## 2022-12-27 PROCEDURE — 99214 PR OFFICE/OUTPT VISIT, EST, LEVL IV, 30-39 MIN: ICD-10-PCS | Mod: S$GLB,,, | Performed by: SURGERY

## 2022-12-27 PROCEDURE — 4010F PR ACE/ARB THEARPY RXD/TAKEN: ICD-10-PCS | Mod: CPTII,S$GLB,, | Performed by: SURGERY

## 2022-12-27 PROCEDURE — 3044F HG A1C LEVEL LT 7.0%: CPT | Mod: CPTII,S$GLB,, | Performed by: SURGERY

## 2022-12-27 PROCEDURE — 3008F PR BODY MASS INDEX (BMI) DOCUMENTED: ICD-10-PCS | Mod: CPTII,S$GLB,, | Performed by: SURGERY

## 2022-12-27 PROCEDURE — 3288F PR FALLS RISK ASSESSMENT DOCUMENTED: ICD-10-PCS | Mod: CPTII,S$GLB,, | Performed by: SURGERY

## 2022-12-27 PROCEDURE — 3288F FALL RISK ASSESSMENT DOCD: CPT | Mod: CPTII,S$GLB,, | Performed by: SURGERY

## 2022-12-27 PROCEDURE — 1159F PR MEDICATION LIST DOCUMENTED IN MEDICAL RECORD: ICD-10-PCS | Mod: CPTII,S$GLB,, | Performed by: SURGERY

## 2022-12-27 PROCEDURE — 3075F PR MOST RECENT SYSTOLIC BLOOD PRESS GE 130-139MM HG: ICD-10-PCS | Mod: CPTII,S$GLB,, | Performed by: SURGERY

## 2022-12-27 PROCEDURE — 3044F PR MOST RECENT HEMOGLOBIN A1C LEVEL <7.0%: ICD-10-PCS | Mod: CPTII,S$GLB,, | Performed by: SURGERY

## 2022-12-27 PROCEDURE — 1159F MED LIST DOCD IN RCRD: CPT | Mod: CPTII,S$GLB,, | Performed by: SURGERY

## 2022-12-27 PROCEDURE — 99999 PR PBB SHADOW E&M-EST. PATIENT-LVL III: CPT | Mod: PBBFAC,,, | Performed by: SURGERY

## 2022-12-27 PROCEDURE — 3079F PR MOST RECENT DIASTOLIC BLOOD PRESSURE 80-89 MM HG: ICD-10-PCS | Mod: CPTII,S$GLB,, | Performed by: SURGERY

## 2022-12-27 PROCEDURE — 1101F PT FALLS ASSESS-DOCD LE1/YR: CPT | Mod: CPTII,S$GLB,, | Performed by: SURGERY

## 2022-12-27 PROCEDURE — 1126F PR PAIN SEVERITY QUANTIFIED, NO PAIN PRESENT: ICD-10-PCS | Mod: CPTII,S$GLB,, | Performed by: SURGERY

## 2022-12-27 PROCEDURE — 99999 PR PBB SHADOW E&M-EST. PATIENT-LVL III: ICD-10-PCS | Mod: PBBFAC,,, | Performed by: SURGERY

## 2022-12-27 PROCEDURE — 99214 OFFICE O/P EST MOD 30 MIN: CPT | Mod: S$GLB,,, | Performed by: SURGERY

## 2022-12-27 PROCEDURE — 4010F ACE/ARB THERAPY RXD/TAKEN: CPT | Mod: CPTII,S$GLB,, | Performed by: SURGERY

## 2022-12-27 NOTE — PROGRESS NOTES
Marvin Shaw MD VI                       [facility]                         2022    HPI:  Lula De Leon is a 67 y.o. female with   Patient Active Problem List   Diagnosis    Elevated blood pressure reading    Rheumatoid arthritis involving both hands    Encounter for screening for malignant neoplasm of breast    Gastroesophageal reflux disease    Hyperlipidemia    being managed by PCP and specialists who is here today for evaluation of pulsating neck structures.  US shows dilated R brachiocephalic artery.  No TIA or CVA.  No SOB or CP.  Patient states location is neck occurring for yrs.  Associated signs and symptoms include none.  Quality is dull and severity is 1/10.   Alleviating factors include none.  Worsening factors include none.    no MI  no Stroke  Tobacco use: denies    2022:  No new complaints.    Past Medical History:   Diagnosis Date    Basal cell carcinoma of right ear 10/18/2022    Rheumatoid arthritis      Past Surgical History:   Procedure Laterality Date    BREAST LUMPECTOMY Right     exc biopsy    BREAST SURGERY      lobectomy 2017 - [facility]    CARPAL TUNNEL RELEASE Right     SINUS SURGERY  2017    Dr. Christel FLORES and Dr. Chilel     Family History   Problem Relation Age of Onset    Arthritis Mother     Aneurysm Mother 64    Heart attack Father 70    Epilepsy Sister      Social History     Socioeconomic History    Marital status:    Occupational History    Occupation: retired - used to make prosthesis and dental implants   Tobacco Use    Smoking status: Former     Packs/day: 1.00     Years: 6.00     Pack years: 6.00     Types: Cigarettes     Quit date:      Years since quittin.0    Smokeless tobacco: Never   Substance and Sexual Activity    Alcohol use: Yes       Current Outpatient Medications:     ascorbic acid, vitamin C, (VITAMIN C) 500 MG tablet, Take 500 mg by mouth., Disp: , Rfl:     atorvastatin (LIPITOR) 20 MG tablet,  Take 1 tablet (20 mg total) by mouth once daily., Disp: 90 tablet, Rfl: 1    beta carotene 18156 UNIT capsule, Take 25,000 Units by mouth., Disp: , Rfl:     calcium carbonate (OS-ONEIDA) 600 mg calcium (1,500 mg) Tab, Take 600 mg by mouth., Disp: , Rfl:     cetirizine (ZYRTEC) 10 MG tablet, Take 10 mg by mouth., Disp: , Rfl:     cyanocobalamin (VITAMIN B-12) 1000 MCG tablet, Take 1,000 mcg by mouth., Disp: , Rfl:     diclofenac sodium (VOLTAREN) 1 % Gel, Apply 2 g topically 4 (four) times daily., Disp: 1 Tube, Rfl: 3    richa prim-linoleic-gamolenic ac (PRIMROSE OIL) 1,000 mg Cap, Take by mouth., Disp: , Rfl:     flaxseed oil Oil, 800 mg by Other route., Disp: , Rfl:     folic acid (FOLVITE) 1 MG tablet, Take 1 tablet (1 mg total) by mouth once daily., Disp: 90 tablet, Rfl: 1    hydrOXYchloroQUINE (PLAQUENIL) 200 mg tablet, Take 1 tablet (200 mg total) by mouth once daily., Disp: 90 tablet, Rfl: 1    iron bisgly,ps-FA-B-C#12-succ 65 mg-65 mg -1,000 mcg (24) Tab, Take by mouth., Disp: , Rfl:     losartan (COZAAR) 25 MG tablet, Take 2 tablets (50 mg total) by mouth once daily., Disp: 180 tablet, Rfl: 1    MAGNESIUM CARBONATE ORAL, Take 600 mg by mouth., Disp: , Rfl:     methotrexate 2.5 MG Tab, Take 6 tablets (15 mg total) by mouth every 7 days., Disp: 84 tablet, Rfl: 1    omega-3 fatty acids/fish oil (FISH OIL-OMEGA-3 FATTY ACIDS) 300-1,000 mg capsule, Take 2 g by mouth., Disp: , Rfl:     ondansetron (ZOFRAN-ODT) 4 MG TbDL, Take 1 tablet (4 mg total) by mouth every 8 (eight) hours as needed (nausea)., Disp: 15 tablet, Rfl: 0    pantoprazole (PROTONIX) 40 MG tablet, Take 1 tablet (40 mg total) by mouth once daily., Disp: 90 tablet, Rfl: 2    sulfaSALAzine (AZULFIDINE) 500 mg Tab, Take 1 tablet (500 mg total) by mouth 2 (two) times a day., Disp: 180 tablet, Rfl: 1    upadacitinib (RINVOQ) 15 mg 24 hr tablet, Take 1 tablet (15 mg total) by mouth once daily., Disp: 30 tablet, Rfl: 11    vitamin E 400 UNIT capsule, Take 400  Units by mouth., Disp: , Rfl:     zinc gluconate 50 mg tablet, Take 50 mg by mouth., Disp: , Rfl:     REVIEW OF SYSTEMS:  General: No fevers or chills; ENT: No sore throat; Allergy and Immunology: no persistent infections; Hematological and Lymphatic: No history of bleeding or easy bruising; Endocrine: negative; Respiratory: no cough, shortness of breath, or wheezing; Cardiovascular: no chest pain or dyspnea on exertion; Gastrointestinal: no abdominal pain/back, change in bowel habits, or bloody stools; Genito-Urinary: no dysuria, trouble voiding, or hematuria; Musculoskeletal: negative; Neurological: no TIA or stroke symptoms; Psychiatric: no nervousness, anxiety or depression.    PHYSICAL EXAM:                General appearance:  Alert, well-appearing, and in no distress.  Oriented to person, place, and time                    Neurological: Normal speech, no focal findings noted; CN II - XII grossly intact. RLE with  sensation to light touch, LLE wit sensation to light touch.            Musculoskeletal: Digits/nail without cyanosis/clubbing.  Strength 5/5 BUE.                    Neck: Supple, no significant adenopathy, no carotid bruit can be auscultated, pulsatility to neck, palpable carotid arteries, non tender                  Chest:  Clear to auscultation, no wheezes, rales or rhonchi, symmetric air entry. No use of accessory muscles               Cardiac: Normal rate and regular rhythm, S1 and S2 normal            Abdomen: Soft, nontender, nondistended, no masses or organomegaly, no hernia     No rebound tenderness noted; bowel sounds normal     Pulsatile aortic mass is non palpable.     No groin adenopathy      Extremities:   2+ R femoral pulse, 2+ L femoral pulse     1+ R popliteal pulse, 1+ L popliteal pulse     1+ R PT pulse, 1+ L PT pulse     1+ R DP pulse, 1+ L DP pulse     no RLE edema, no LLE edema    Skin: RLE no ulcer; LLE no ulcer    LAB RESULTS:  No results found for: CBC  No results found for:  LABPROT, INR  Lab Results   Component Value Date     10/17/2022    K 4.5 10/17/2022     10/17/2022    CO2 24 10/17/2022    GLU 93 10/17/2022    BUN 18 10/17/2022    CREATININE 0.8 10/17/2022    CALCIUM 10.1 10/17/2022    ANIONGAP 11 10/17/2022    EGFRNONAA >60 05/24/2022     Lab Results   Component Value Date    WBC 6.04 10/17/2022    RBC 3.65 (L) 10/17/2022    HGB 12.1 10/17/2022    HCT 36.2 (L) 10/17/2022    MCV 99 (H) 10/17/2022    MCH 33.2 (H) 10/17/2022    MCHC 33.4 10/17/2022    RDW 13.0 10/17/2022     10/17/2022    MPV 9.2 10/17/2022    GRAN 2.3 10/17/2022    GRAN 37.7 (L) 10/17/2022    LYMPH 3.0 10/17/2022    LYMPH 49.7 (H) 10/17/2022    MONO 0.7 10/17/2022    MONO 11.1 10/17/2022    EOS 0.1 10/17/2022    BASO 0.02 10/17/2022    EOSINOPHIL 1.0 10/17/2022    BASOPHIL 0.3 10/17/2022    DIFFMETHOD Automated 10/17/2022     .  Lab Results   Component Value Date    HGBA1C 4.8 05/24/2022       IMAGING:  All pertinent imaging has been reviewed and interpreted independently.    IMP/PLAN:  67 y.o. female with   Patient Active Problem List   Diagnosis    Elevated blood pressure reading    Rheumatoid arthritis involving both hands    Encounter for screening for malignant neoplasm of breast    Gastroesophageal reflux disease    Hyperlipidemia    being managed by PCP and specialists who is here today for evaluation of dilated R brachiocephalic artery.    -Rec CTA head/neck   -Daily ASA  -RTC 6 mo    I spent 12 minutes evaluating this patient and greater than 50% of the time was spent counseling, coordinator care and discussing the plan of care.  All questions were answered and patient stated understanding with agreement with the above treatment plan.    Marvin Shaw MD [facility]  Vascular and Endovascular Surgery

## 2022-12-28 DIAGNOSIS — Q27.39: Primary | ICD-10-CM

## 2023-01-04 ENCOUNTER — LAB VISIT (OUTPATIENT)
Dept: LAB | Facility: HOSPITAL | Age: 68
End: 2023-01-04
Attending: INTERNAL MEDICINE
Payer: MEDICARE

## 2023-01-04 ENCOUNTER — OFFICE VISIT (OUTPATIENT)
Dept: RHEUMATOLOGY | Facility: CLINIC | Age: 68
End: 2023-01-04
Payer: MEDICARE

## 2023-01-04 VITALS
DIASTOLIC BLOOD PRESSURE: 75 MMHG | OXYGEN SATURATION: 97 % | SYSTOLIC BLOOD PRESSURE: 139 MMHG | HEART RATE: 76 BPM | HEIGHT: 63 IN | WEIGHT: 152.81 LBS | BODY MASS INDEX: 27.07 KG/M2 | RESPIRATION RATE: 20 BRPM

## 2023-01-04 DIAGNOSIS — M06.9 RHEUMATOID ARTHRITIS INVOLVING BOTH HANDS, UNSPECIFIED WHETHER RHEUMATOID FACTOR PRESENT: ICD-10-CM

## 2023-01-04 DIAGNOSIS — D84.821 IMMUNODEFICIENCY DUE TO TREATMENT WITH IMMUNOSUPPRESSIVE MEDICATION: ICD-10-CM

## 2023-01-04 DIAGNOSIS — Z79.899 IMMUNODEFICIENCY DUE TO TREATMENT WITH IMMUNOSUPPRESSIVE MEDICATION: ICD-10-CM

## 2023-01-04 DIAGNOSIS — Z71.89 COUNSELING AND COORDINATION OF CARE: ICD-10-CM

## 2023-01-04 DIAGNOSIS — M06.9 RHEUMATOID ARTHRITIS INVOLVING BOTH HANDS, UNSPECIFIED WHETHER RHEUMATOID FACTOR PRESENT: Primary | ICD-10-CM

## 2023-01-04 DIAGNOSIS — M15.9 PRIMARY OSTEOARTHRITIS INVOLVING MULTIPLE JOINTS: ICD-10-CM

## 2023-01-04 LAB
ALBUMIN SERPL BCP-MCNC: 4.2 G/DL (ref 3.5–5.2)
ALP SERPL-CCNC: 42 U/L (ref 55–135)
ALT SERPL W/O P-5'-P-CCNC: 40 U/L (ref 10–44)
ANION GAP SERPL CALC-SCNC: 4 MMOL/L (ref 8–16)
AST SERPL-CCNC: 36 U/L (ref 10–40)
BASOPHILS # BLD AUTO: 0.03 K/UL (ref 0–0.2)
BASOPHILS NFR BLD: 0.5 % (ref 0–1.9)
BILIRUB SERPL-MCNC: 0.4 MG/DL (ref 0.1–1)
BUN SERPL-MCNC: 21 MG/DL (ref 8–23)
CALCIUM SERPL-MCNC: 10 MG/DL (ref 8.7–10.5)
CHLORIDE SERPL-SCNC: 106 MMOL/L (ref 95–110)
CO2 SERPL-SCNC: 30 MMOL/L (ref 23–29)
CREAT SERPL-MCNC: 0.9 MG/DL (ref 0.5–1.4)
DIFFERENTIAL METHOD: ABNORMAL
EOSINOPHIL # BLD AUTO: 0.1 K/UL (ref 0–0.5)
EOSINOPHIL NFR BLD: 1 % (ref 0–8)
ERYTHROCYTE [DISTWIDTH] IN BLOOD BY AUTOMATED COUNT: 13.3 % (ref 11.5–14.5)
EST. GFR  (NO RACE VARIABLE): >60 ML/MIN/1.73 M^2
GLUCOSE SERPL-MCNC: 94 MG/DL (ref 70–110)
HCT VFR BLD AUTO: 36.5 % (ref 37–48.5)
HGB BLD-MCNC: 11.6 G/DL (ref 12–16)
IMM GRANULOCYTES # BLD AUTO: 0.01 K/UL (ref 0–0.04)
IMM GRANULOCYTES NFR BLD AUTO: 0.2 % (ref 0–0.5)
LYMPHOCYTES # BLD AUTO: 2.7 K/UL (ref 1–4.8)
LYMPHOCYTES NFR BLD: 47.5 % (ref 18–48)
MCH RBC QN AUTO: 33.9 PG (ref 27–31)
MCHC RBC AUTO-ENTMCNC: 31.8 G/DL (ref 32–36)
MCV RBC AUTO: 107 FL (ref 82–98)
MONOCYTES # BLD AUTO: 0.6 K/UL (ref 0.3–1)
MONOCYTES NFR BLD: 10.1 % (ref 4–15)
NEUTROPHILS # BLD AUTO: 2.3 K/UL (ref 1.8–7.7)
NEUTROPHILS NFR BLD: 40.7 % (ref 38–73)
NRBC BLD-RTO: 0 /100 WBC
PLATELET # BLD AUTO: 313 K/UL (ref 150–450)
PMV BLD AUTO: 9.9 FL (ref 9.2–12.9)
POTASSIUM SERPL-SCNC: 4.7 MMOL/L (ref 3.5–5.1)
PROT SERPL-MCNC: 7.5 G/DL (ref 6–8.4)
RBC # BLD AUTO: 3.42 M/UL (ref 4–5.4)
SODIUM SERPL-SCNC: 140 MMOL/L (ref 136–145)
WBC # BLD AUTO: 5.73 K/UL (ref 3.9–12.7)

## 2023-01-04 PROCEDURE — 3008F BODY MASS INDEX DOCD: CPT | Mod: CPTII,S$GLB,, | Performed by: INTERNAL MEDICINE

## 2023-01-04 PROCEDURE — 99999 PR PBB SHADOW E&M-EST. PATIENT-LVL III: CPT | Mod: PBBFAC,,, | Performed by: INTERNAL MEDICINE

## 2023-01-04 PROCEDURE — 1101F PR PT FALLS ASSESS DOC 0-1 FALLS W/OUT INJ PAST YR: ICD-10-PCS | Mod: CPTII,S$GLB,, | Performed by: INTERNAL MEDICINE

## 2023-01-04 PROCEDURE — 99214 OFFICE O/P EST MOD 30 MIN: CPT | Mod: S$GLB,,, | Performed by: INTERNAL MEDICINE

## 2023-01-04 PROCEDURE — 80053 COMPREHEN METABOLIC PANEL: CPT | Performed by: INTERNAL MEDICINE

## 2023-01-04 PROCEDURE — 3288F FALL RISK ASSESSMENT DOCD: CPT | Mod: CPTII,S$GLB,, | Performed by: INTERNAL MEDICINE

## 2023-01-04 PROCEDURE — 3078F DIAST BP <80 MM HG: CPT | Mod: CPTII,S$GLB,, | Performed by: INTERNAL MEDICINE

## 2023-01-04 PROCEDURE — 3008F PR BODY MASS INDEX (BMI) DOCUMENTED: ICD-10-PCS | Mod: CPTII,S$GLB,, | Performed by: INTERNAL MEDICINE

## 2023-01-04 PROCEDURE — 1125F PR PAIN SEVERITY QUANTIFIED, PAIN PRESENT: ICD-10-PCS | Mod: CPTII,S$GLB,, | Performed by: INTERNAL MEDICINE

## 2023-01-04 PROCEDURE — 3075F PR MOST RECENT SYSTOLIC BLOOD PRESS GE 130-139MM HG: ICD-10-PCS | Mod: CPTII,S$GLB,, | Performed by: INTERNAL MEDICINE

## 2023-01-04 PROCEDURE — 1159F PR MEDICATION LIST DOCUMENTED IN MEDICAL RECORD: ICD-10-PCS | Mod: CPTII,S$GLB,, | Performed by: INTERNAL MEDICINE

## 2023-01-04 PROCEDURE — 99999 PR PBB SHADOW E&M-EST. PATIENT-LVL III: ICD-10-PCS | Mod: PBBFAC,,, | Performed by: INTERNAL MEDICINE

## 2023-01-04 PROCEDURE — 36415 COLL VENOUS BLD VENIPUNCTURE: CPT | Mod: PO | Performed by: INTERNAL MEDICINE

## 2023-01-04 PROCEDURE — 99214 PR OFFICE/OUTPT VISIT, EST, LEVL IV, 30-39 MIN: ICD-10-PCS | Mod: S$GLB,,, | Performed by: INTERNAL MEDICINE

## 2023-01-04 PROCEDURE — 3075F SYST BP GE 130 - 139MM HG: CPT | Mod: CPTII,S$GLB,, | Performed by: INTERNAL MEDICINE

## 2023-01-04 PROCEDURE — 1125F AMNT PAIN NOTED PAIN PRSNT: CPT | Mod: CPTII,S$GLB,, | Performed by: INTERNAL MEDICINE

## 2023-01-04 PROCEDURE — 85025 COMPLETE CBC W/AUTO DIFF WBC: CPT | Performed by: INTERNAL MEDICINE

## 2023-01-04 PROCEDURE — 1101F PT FALLS ASSESS-DOCD LE1/YR: CPT | Mod: CPTII,S$GLB,, | Performed by: INTERNAL MEDICINE

## 2023-01-04 PROCEDURE — 3288F PR FALLS RISK ASSESSMENT DOCUMENTED: ICD-10-PCS | Mod: CPTII,S$GLB,, | Performed by: INTERNAL MEDICINE

## 2023-01-04 PROCEDURE — 3078F PR MOST RECENT DIASTOLIC BLOOD PRESSURE < 80 MM HG: ICD-10-PCS | Mod: CPTII,S$GLB,, | Performed by: INTERNAL MEDICINE

## 2023-01-04 PROCEDURE — 1159F MED LIST DOCD IN RCRD: CPT | Mod: CPTII,S$GLB,, | Performed by: INTERNAL MEDICINE

## 2023-01-04 RX ORDER — METHOTREXATE 2.5 MG/1
15 TABLET ORAL
Qty: 84 TABLET | Refills: 1 | Status: SHIPPED | OUTPATIENT
Start: 2023-01-04 | End: 2023-07-03 | Stop reason: SDUPTHER

## 2023-01-04 RX ORDER — FOLIC ACID 1 MG/1
1 TABLET ORAL DAILY
Qty: 90 TABLET | Refills: 1 | Status: SHIPPED | OUTPATIENT
Start: 2023-01-04 | End: 2023-12-04 | Stop reason: SDUPTHER

## 2023-01-04 RX ORDER — HYDROXYCHLOROQUINE SULFATE 200 MG/1
200 TABLET, FILM COATED ORAL DAILY
Qty: 90 TABLET | Refills: 1 | Status: SHIPPED | OUTPATIENT
Start: 2023-01-04 | End: 2023-12-11 | Stop reason: SDUPTHER

## 2023-01-04 NOTE — PROGRESS NOTES
RHEUMATOLOGY OUTPATIENT CLINIC NOTE    1/4/2023    Attending Rheumatologist: Ata Hooks  Primary Care Provider: Sylvain Haywood MD   Physician Requesting Consultation: No referring provider defined for this encounter.  Chief Complaint/Reason For Consultation:  No chief complaint on file.        Subjective:       JOSSY De Leon is a 67 y.o. White female with Hx of Seropositive RA who comes to [facility] care.   Patient diagnosed two years ago. Reports it started in the wrist as swelling and pain, could barely move it. Then was seen by a Rheumatologist which placed her on therapy eventually escalating to Triple Therapy (SSZ, MTX, HCQ) for control. Was on MTX 8 tabs, SSZ 4 and decreased to MTX 4 and SSZ 2 in August due to Renal Function.   Patient reports good response on current therapy. Denies joint swelling, tenderness. Reports about 10-15 of morning stiffness. Reports also lower back and knee pain. Most typically with use. Was prior on NSAIDs but stopped, now on Tylenol with good relief.   FHx: Possible Mom, and Aunt with RA     Today  Patient here for follow up.   Last visit RA management continued. She notes since starting the Rinvoq no further joint swelling or pain. Feels well, no complaints. Tolerating meds.    Review of Systems   Constitutional:  Negative for appetite change, chills, fatigue, fever and unexpected weight change.   HENT:  Negative for nasal congestion, ear discharge, ear pain, hearing loss, mouth sores, nosebleeds, sneezing, sore throat, tinnitus and trouble swallowing.    Eyes:  Negative for photophobia, pain, discharge, redness, itching and visual disturbance.   Respiratory:  Negative for cough, chest tightness, shortness of breath and wheezing.    Cardiovascular:  Negative for chest pain, palpitations and leg swelling.   Gastrointestinal:  Negative for abdominal distention, abdominal pain, blood in stool, constipation, diarrhea, nausea and vomiting.   Endocrine: Negative  for cold intolerance, heat intolerance, polydipsia, polyphagia and polyuria.   Genitourinary:  Negative for difficulty urinating, dyspareunia, dysuria, flank pain, frequency, genital sores, hematuria, menstrual problem, pelvic pain, urgency, vaginal bleeding, vaginal discharge, vaginal pain and vaginal dryness.   Musculoskeletal:  Positive for arthralgias and joint swelling. Negative for back pain, gait problem, leg pain, myalgias, neck pain, neck stiffness and joint deformity.   Integumentary:  Negative for pallor and rash.   Neurological:  Negative for dizziness, seizures, weakness, light-headedness, numbness and headaches.   Hematological:  Negative for adenopathy. Does not bruise/bleed easily.   Psychiatric/Behavioral:  Negative for confusion, decreased concentration and sleep disturbance. The patient is not nervous/anxious.    All other systems reviewed and are negative.       Chronic comorbid conditions affecting medical decision making today:  Past Medical History:   Diagnosis Date    Basal cell carcinoma of right ear 10/18/2022    Rheumatoid arthritis      Past Surgical History:   Procedure Laterality Date    BREAST LUMPECTOMY Right     exc biopsy    BREAST SURGERY      lobectomy 2017 - [facility]    CARPAL TUNNEL RELEASE Right     SINUS SURGERY  2017    Dr. Christel FLORES and Dr. Chilel     Family History   Problem Relation Age of Onset    Arthritis Mother     Aneurysm Mother 64    Heart attack Father 70    Epilepsy Sister      Social History     Substance and Sexual Activity   Alcohol Use Yes     Social History     Tobacco Use   Smoking Status Former    Packs/day: 1.00    Years: 6.00    Pack years: 6.00    Types: Cigarettes    Quit date:     Years since quittin.0   Smokeless Tobacco Never     Social History     Substance and Sexual Activity   Drug Use Not on file       Current Outpatient Medications:     ascorbic acid, vitamin C, (VITAMIN C) 500 MG tablet, Take 500 mg by mouth., Disp: ,  Rfl:     atorvastatin (LIPITOR) 20 MG tablet, Take 1 tablet (20 mg total) by mouth once daily., Disp: 90 tablet, Rfl: 1    beta carotene 43906 UNIT capsule, Take 25,000 Units by mouth., Disp: , Rfl:     calcium carbonate (OS-ONEIDA) 600 mg calcium (1,500 mg) Tab, Take 600 mg by mouth., Disp: , Rfl:     cetirizine (ZYRTEC) 10 MG tablet, Take 10 mg by mouth., Disp: , Rfl:     cyanocobalamin (VITAMIN B-12) 1000 MCG tablet, Take 1,000 mcg by mouth., Disp: , Rfl:     diclofenac sodium (VOLTAREN) 1 % Gel, Apply 2 g topically 4 (four) times daily., Disp: 1 Tube, Rfl: 3    richa prim-linoleic-gamolenic ac (PRIMROSE OIL) 1,000 mg Cap, Take by mouth., Disp: , Rfl:     flaxseed oil Oil, 800 mg by Other route., Disp: , Rfl:     iron bisgly,ps-FA-B-C#12-succ 65 mg-65 mg -1,000 mcg (24) Tab, Take by mouth., Disp: , Rfl:     losartan (COZAAR) 25 MG tablet, Take 2 tablets (50 mg total) by mouth once daily., Disp: 180 tablet, Rfl: 1    MAGNESIUM CARBONATE ORAL, Take 600 mg by mouth., Disp: , Rfl:     omega-3 fatty acids/fish oil (FISH OIL-OMEGA-3 FATTY ACIDS) 300-1,000 mg capsule, Take 2 g by mouth., Disp: , Rfl:     ondansetron (ZOFRAN-ODT) 4 MG TbDL, Take 1 tablet (4 mg total) by mouth every 8 (eight) hours as needed (nausea)., Disp: 15 tablet, Rfl: 0    pantoprazole (PROTONIX) 40 MG tablet, Take 1 tablet (40 mg total) by mouth once daily., Disp: 90 tablet, Rfl: 2    sulfaSALAzine (AZULFIDINE) 500 mg Tab, Take 1 tablet (500 mg total) by mouth 2 (two) times a day., Disp: 180 tablet, Rfl: 1    upadacitinib (RINVOQ) 15 mg 24 hr tablet, Take 1 tablet (15 mg total) by mouth once daily., Disp: 30 tablet, Rfl: 11    vitamin E 400 UNIT capsule, Take 400 Units by mouth., Disp: , Rfl:     zinc gluconate 50 mg tablet, Take 50 mg by mouth., Disp: , Rfl:     folic acid (FOLVITE) 1 MG tablet, Take 1 tablet (1 mg total) by mouth once daily., Disp: 90 tablet, Rfl: 1    hydrOXYchloroQUINE (PLAQUENIL) 200 mg tablet, Take 1 tablet (200 mg total) by mouth  once daily., Disp: 90 tablet, Rfl: 1    methotrexate 2.5 MG Tab, Take 6 tablets (15 mg total) by mouth every 7 days., Disp: 84 tablet, Rfl: 1     Objective:         Vitals:    01/04/23 1143   BP: 139/75   Pulse: 76   Resp: 20     Physical Exam   Constitutional: She is oriented to person, place, and time.   HENT:   Head: Normocephalic and atraumatic.   Right Ear: External ear normal.   Left Ear: External ear normal.   Nose: Nose normal.   Mouth/Throat: Oropharynx is clear and moist.   Eyes: Pupils are equal, round, and reactive to light. Conjunctivae are normal.   Cardiovascular: Normal rate and regular rhythm.   Pulmonary/Chest: Effort normal and breath sounds normal.   Abdominal: Soft. Bowel sounds are normal.   Musculoskeletal:      Right shoulder: Normal.      Left shoulder: Normal.      Right elbow: Normal.      Left elbow: Normal.      Right wrist: Normal.      Left wrist: Normal.      Cervical back: Normal range of motion and neck supple.      Right knee: Normal.      Left knee: Normal.      Comments: Cannot make fist, no synovitis  Knee crepitus  Mild right midfoot warmth    Neurological: She is alert and oriented to person, place, and time.   Skin: No rash noted. No erythema.   Psychiatric: Mood and affect normal.       Right Side Rheumatological Exam     Examination finds the shoulder, elbow, wrist, knee, 1st PIP, 1st MCP, 2nd PIP, 2nd MCP, 3rd PIP, 3rd MCP, 4th PIP, 4th MCP, 5th PIP and 5th MCP normal.    Knee Exam   Patellofemoral Crepitus: positive    Left Side Rheumatological Exam     Examination finds the shoulder, elbow, wrist, knee, 1st PIP, 1st MCP, 2nd PIP, 2nd MCP, 3rd PIP, 3rd MCP, 4th PIP, 4th MCP, 5th PIP and 5th MCP normal.    Knee Exam     Patellofemoral Crepitus: positive        Reviewed old and all outside pertinent medical records available.    All lab results personally reviewed and interpreted by me.  Lab Results   Component Value Date    WBC 6.04 10/17/2022    HGB 12.1 10/17/2022    HCT  36.2 (L) 10/17/2022    MCV 99 (H) 10/17/2022    MCH 33.2 (H) 10/17/2022    MCHC 33.4 10/17/2022    RDW 13.0 10/17/2022     10/17/2022    MPV 9.2 10/17/2022       Lab Results   Component Value Date     10/17/2022    K 4.5 10/17/2022     10/17/2022    CO2 24 10/17/2022    GLU 93 10/17/2022    BUN 18 10/17/2022    CALCIUM 10.1 10/17/2022    PROT 7.7 10/17/2022    ALBUMIN 4.4 10/17/2022    BILITOT 0.5 10/17/2022    AST 45 (H) 10/17/2022    ALKPHOS 49 (L) 10/17/2022    ALT 56 (H) 10/17/2022       No results found for: COLORU, APPEARANCEUA, SPECGRAV, PHUR, PHUA, PROTEINUA, GLUCOSEU, KETONESU, BLOODU, LEUKOCYTESUR, NITRITE, UROBILINOGEN    Lab Results   Component Value Date    CRP 3.0 11/03/2020       Lab Results   Component Value Date    SEDRATE 20 11/03/2020       Lab Results   Component Value Date    .0 (H) 11/03/2020    SEDRATE 20 11/03/2020       No components found for: 25OHVITDTOT, 85NEJYAP4, 55GRRQKO4, METHODNOTE    No results found for: URICACID    No components found for: TSPOTTB    Imaging:  All imaging reviewed and independently interpreted by me.       ASSESSMENT / PLAN:     Lula De Leon is a 67 y.o. White female with:      1. Rheumatoid arthritis involving both hands, unspecified whether rheumatoid factor present  - Patient with known diagnosis of seropositive RA  - in remission  - will taper off SSZ and see how she does, since getting on Rinvoq no further issues  - continue Rinvoq  - continue MTX 15mg weekly, HCQ 200mg daily      - update labs   - reassurance     2. Low Back and Knee Pain likely OA   -continue Tylenol PRN  -Reassurance and Exercise     3. DMARD Toxicity Monitoring   -labs today   -Daily FA  -Annual EYE exam   -no live vaccines  -vaccines per guidelines     4. Other specified counseling  - over 10 minutes spent regarding below topics:  - Immunization counseling done.  - Nutrition and exercise counseling.  - Limitation of alcohol consumption.  - Regular exercise:   Aerobic and resistance.  - Medication counseling provided.      Follow up in about 3 months (around 4/4/2023).    Method of contact with patient concerns: [facility]    Disclaimer:  This note is prepared using voice recognition software and as such is likely to have errors and has not been proof read. Please contact me for questions.     Time spent: 30 minutes in face to face discussion concerning diagnosis, prognosis, review of lab and test results, benefits of treatment as well as management of disease, counseling of patient and coordination of care between various health care providers.  Greater than half the time spent was used for coordination of care and counseling of patient.    Ata Hooks M.D.  Rheumatology Department   [facility]

## 2023-03-06 ENCOUNTER — OFFICE VISIT (OUTPATIENT)
Dept: DERMATOLOGY | Facility: CLINIC | Age: 68
End: 2023-03-06
Payer: MEDICARE

## 2023-03-06 DIAGNOSIS — L84 CLAVUS: ICD-10-CM

## 2023-03-06 DIAGNOSIS — L82.1 SK (SEBORRHEIC KERATOSIS): ICD-10-CM

## 2023-03-06 DIAGNOSIS — L57.0 ACTINIC KERATOSIS: Primary | ICD-10-CM

## 2023-03-06 DIAGNOSIS — L81.4 LENTIGINES: ICD-10-CM

## 2023-03-06 DIAGNOSIS — Z85.828 HISTORY OF NONMELANOMA SKIN CANCER: ICD-10-CM

## 2023-03-06 PROCEDURE — 1101F PR PT FALLS ASSESS DOC 0-1 FALLS W/OUT INJ PAST YR: ICD-10-PCS | Mod: CPTII,S$GLB,, | Performed by: DERMATOLOGY

## 2023-03-06 PROCEDURE — 17000 DESTRUCT PREMALG LESION: CPT | Mod: S$GLB,,, | Performed by: DERMATOLOGY

## 2023-03-06 PROCEDURE — 99999 PR PBB SHADOW E&M-EST. PATIENT-LVL III: CPT | Mod: PBBFAC,,, | Performed by: DERMATOLOGY

## 2023-03-06 PROCEDURE — 99999 PR PBB SHADOW E&M-EST. PATIENT-LVL III: ICD-10-PCS | Mod: PBBFAC,,, | Performed by: DERMATOLOGY

## 2023-03-06 PROCEDURE — 3288F FALL RISK ASSESSMENT DOCD: CPT | Mod: CPTII,S$GLB,, | Performed by: DERMATOLOGY

## 2023-03-06 PROCEDURE — 99213 PR OFFICE/OUTPT VISIT, EST, LEVL III, 20-29 MIN: ICD-10-PCS | Mod: 25,S$GLB,, | Performed by: DERMATOLOGY

## 2023-03-06 PROCEDURE — 3288F PR FALLS RISK ASSESSMENT DOCUMENTED: ICD-10-PCS | Mod: CPTII,S$GLB,, | Performed by: DERMATOLOGY

## 2023-03-06 PROCEDURE — 1160F RVW MEDS BY RX/DR IN RCRD: CPT | Mod: CPTII,S$GLB,, | Performed by: DERMATOLOGY

## 2023-03-06 PROCEDURE — 1126F AMNT PAIN NOTED NONE PRSNT: CPT | Mod: CPTII,S$GLB,, | Performed by: DERMATOLOGY

## 2023-03-06 PROCEDURE — 17003 DESTRUCT PREMALG LES 2-14: CPT | Mod: S$GLB,,, | Performed by: DERMATOLOGY

## 2023-03-06 PROCEDURE — 1160F PR REVIEW ALL MEDS BY PRESCRIBER/CLIN PHARMACIST DOCUMENTED: ICD-10-PCS | Mod: CPTII,S$GLB,, | Performed by: DERMATOLOGY

## 2023-03-06 PROCEDURE — 99213 OFFICE O/P EST LOW 20 MIN: CPT | Mod: 25,S$GLB,, | Performed by: DERMATOLOGY

## 2023-03-06 PROCEDURE — 17003 DESTRUCTION, PREMALIGNANT LESIONS; SECOND THROUGH 14 LESIONS: ICD-10-PCS | Mod: S$GLB,,, | Performed by: DERMATOLOGY

## 2023-03-06 PROCEDURE — 1159F MED LIST DOCD IN RCRD: CPT | Mod: CPTII,S$GLB,, | Performed by: DERMATOLOGY

## 2023-03-06 PROCEDURE — 17000 PR DESTRUCTION(LASER SURGERY,CRYOSURGERY,CHEMOSURGERY),PREMALIGNANT LESIONS,FIRST LESION: ICD-10-PCS | Mod: S$GLB,,, | Performed by: DERMATOLOGY

## 2023-03-06 PROCEDURE — 4010F ACE/ARB THERAPY RXD/TAKEN: CPT | Mod: CPTII,S$GLB,, | Performed by: DERMATOLOGY

## 2023-03-06 PROCEDURE — 1159F PR MEDICATION LIST DOCUMENTED IN MEDICAL RECORD: ICD-10-PCS | Mod: CPTII,S$GLB,, | Performed by: DERMATOLOGY

## 2023-03-06 PROCEDURE — 1126F PR PAIN SEVERITY QUANTIFIED, NO PAIN PRESENT: ICD-10-PCS | Mod: CPTII,S$GLB,, | Performed by: DERMATOLOGY

## 2023-03-06 PROCEDURE — 4010F PR ACE/ARB THEARPY RXD/TAKEN: ICD-10-PCS | Mod: CPTII,S$GLB,, | Performed by: DERMATOLOGY

## 2023-03-06 PROCEDURE — 1101F PT FALLS ASSESS-DOCD LE1/YR: CPT | Mod: CPTII,S$GLB,, | Performed by: DERMATOLOGY

## 2023-03-06 NOTE — PATIENT INSTRUCTIONS

## 2023-03-06 NOTE — PROGRESS NOTES
Subjective:       Patient ID:  Lula De Leon is a 67 y.o. female who presents for   Chief Complaint   Patient presents with    Lesion     R cheek. R foot     History of Present Illness: The patient presents for follow up of skin check.    The patient was last seen on: 8/8/22 for skin check and bx to the right ear - BCC excised by SSW on 10/18/22  This is a high risk patient here to check for the development of new lesions.    Other skin complaints:   Patient with new complaint of lesion(s)  Location: R cheek  Duration: 3 months  Symptoms: bleeds occ  Relieving factors/Previous treatments: antibiotic oint qday    Patient with new complaint of lesion(s)  Location: R foot - 5th toe  Duration: 3+ months  Symptoms: none  Relieving factors/Previous treatments: neosporin bid          Review of Systems   Skin:  Positive for daily sunscreen use, activity-related sunscreen use and wears hat (activities). Negative for recent sunburn.   Hematologic/Lymphatic: Does not bruise/bleed easily.      Objective:    Physical Exam   Constitutional: She appears well-developed and well-nourished. No distress.   Neurological: She is alert and oriented to person, place, and time. She is not disoriented.   Psychiatric: She has a normal mood and affect.   Skin:   Areas Examined (abnormalities noted in diagram):   Head / Face Inspection Performed  Neck Inspection Performed  Chest / Axilla Inspection Performed  Back Inspection Performed  RUE Inspected  LUE Inspection Performed  Nails and Digits Inspection Performed                     Diagram Legend     Erythematous scaling macule/papule c/w actinic keratosis       Vascular papule c/w angioma      Pigmented verrucoid papule/plaque c/w seborrheic keratosis      Yellow umbilicated papule c/w sebaceous hyperplasia      Irregularly shaped tan macule c/w lentigo     1-2 mm smooth white papules consistent with Milia      Movable subcutaneous cyst with punctum c/w epidermal inclusion cyst       Subcutaneous movable cyst c/w pilar cyst      Firm pink to brown papule c/w dermatofibroma      Pedunculated fleshy papule(s) c/w skin tag(s)      Evenly pigmented macule c/w junctional nevus     Mildly variegated pigmented, slightly irregular-bordered macule c/w mildly atypical nevus      Flesh colored to evenly pigmented papule c/w intradermal nevus       Pink pearly papule/plaque c/w basal cell carcinoma      Erythematous hyperkeratotic cursted plaque c/w SCC      Surgical scar with no sign of skin cancer recurrence      Open and closed comedones      Inflammatory papules and pustules      Verrucoid papule consistent consistent with wart     Erythematous eczematous patches and plaques     Dystrophic onycholytic nail with subungual debris c/w onychomycosis     Umbilicated papule    Erythematous-base heme-crusted tan verrucoid plaque consistent with inflamed seborrheic keratosis     Erythematous Silvery Scaling Plaque c/w Psoriasis     See annotation      Assessment / Plan:        Actinic keratosis  Cryosurgery Procedure Note    Verbal consent from the patient is obtained including, but not limited to, risk of hypopigmentation/hyperpigmentation, scar, recurrence of lesion. The patient is aware of the precancerous quality and need for treatment of these lesions. Liquid nitrogen cryosurgery is applied to the 9 actinic keratoses, as detailed in the physical exam, to produce a freeze injury. The patient is aware that blisters may form and is instructed on wound care with gentle cleansing and use of vaseline ointment to keep moist until healed. The patient is supplied a handout on cryosurgery and is instructed to call if lesions do not completely resolve.    SK (seborrheic keratosis)  These are benign inherited growths without a malignant potential. Reassurance given to patient. No treatment is necessary.     Lentigines  This is a benign hyperpigmented sun induced lesion. Recommend daily sun protection/avoidance and use  of at least SPF 30, broad spectrum sunscreen (OTC drug) will reduce the number of new lesions. Treatment of these benign lesions are considered cosmetic.    Clavus - right b/t 4th and 5th toe  2/2 pressure  Recommend alleviating pressure from 5th toe (spacers)    History of nonmelanoma skin cancer  Area(s) of previous NMSC evaluated with no signs of recurrence.    Upper body skin examination performed today including at least 6 points as noted in physical examination. No lesions suspicious for malignancy noted.    Recommend daily sun protection/avoidance and use of at least SPF 30, broad spectrum sunscreen (OTC drug).              Follow up in about 1 year (around 3/6/2024).

## 2023-03-16 ENCOUNTER — PATIENT OUTREACH (OUTPATIENT)
Dept: ADMINISTRATIVE | Facility: HOSPITAL | Age: 68
End: 2023-03-16
Payer: MEDICARE

## 2023-03-16 DIAGNOSIS — Z12.31 BREAST CANCER SCREENING BY MAMMOGRAM: Primary | ICD-10-CM

## 2023-03-16 NOTE — PROGRESS NOTES
Reached out to pt in regards to overdue Colon Cancer Screening,no voicemail available/unavailable. Office visit scheduled for 04/17/23.Immunization's updated.

## 2023-03-17 ENCOUNTER — PATIENT MESSAGE (OUTPATIENT)
Dept: RESEARCH | Facility: HOSPITAL | Age: 68
End: 2023-03-17
Payer: MEDICARE

## 2023-04-12 ENCOUNTER — PATIENT MESSAGE (OUTPATIENT)
Dept: ADMINISTRATIVE | Facility: HOSPITAL | Age: 68
End: 2023-04-12
Payer: MEDICARE

## 2023-04-17 ENCOUNTER — OFFICE VISIT (OUTPATIENT)
Dept: FAMILY MEDICINE | Facility: CLINIC | Age: 68
End: 2023-04-17
Payer: MEDICARE

## 2023-04-17 VITALS
OXYGEN SATURATION: 97 % | HEIGHT: 63 IN | DIASTOLIC BLOOD PRESSURE: 78 MMHG | HEART RATE: 85 BPM | TEMPERATURE: 99 F | SYSTOLIC BLOOD PRESSURE: 138 MMHG | BODY MASS INDEX: 27.57 KG/M2 | WEIGHT: 155.63 LBS

## 2023-04-17 DIAGNOSIS — Z00.00 HEALTHCARE MAINTENANCE: ICD-10-CM

## 2023-04-17 DIAGNOSIS — Z13.220 ENCOUNTER FOR LIPID SCREENING FOR CARDIOVASCULAR DISEASE: ICD-10-CM

## 2023-04-17 DIAGNOSIS — Z12.11 COLON CANCER SCREENING: ICD-10-CM

## 2023-04-17 DIAGNOSIS — R79.9 ABNORMAL FINDING OF BLOOD CHEMISTRY, UNSPECIFIED: ICD-10-CM

## 2023-04-17 DIAGNOSIS — F41.9 ANXIETY: Primary | ICD-10-CM

## 2023-04-17 DIAGNOSIS — Z12.39 ENCOUNTER FOR SCREENING FOR MALIGNANT NEOPLASM OF BREAST, UNSPECIFIED SCREENING MODALITY: ICD-10-CM

## 2023-04-17 DIAGNOSIS — I70.0 AORTIC ATHEROSCLEROSIS: ICD-10-CM

## 2023-04-17 DIAGNOSIS — G47.00 INSOMNIA, UNSPECIFIED TYPE: ICD-10-CM

## 2023-04-17 DIAGNOSIS — Z13.6 ENCOUNTER FOR LIPID SCREENING FOR CARDIOVASCULAR DISEASE: ICD-10-CM

## 2023-04-17 DIAGNOSIS — Z12.31 ENCOUNTER FOR SCREENING MAMMOGRAM FOR MALIGNANT NEOPLASM OF BREAST: ICD-10-CM

## 2023-04-17 DIAGNOSIS — E78.5 HYPERLIPIDEMIA, UNSPECIFIED HYPERLIPIDEMIA TYPE: ICD-10-CM

## 2023-04-17 DIAGNOSIS — Z78.0 ASYMPTOMATIC POSTMENOPAUSAL STATE: ICD-10-CM

## 2023-04-17 DIAGNOSIS — Q27.39: ICD-10-CM

## 2023-04-17 DIAGNOSIS — I10 ESSENTIAL HYPERTENSION: ICD-10-CM

## 2023-04-17 DIAGNOSIS — M06.9 RHEUMATOID ARTHRITIS INVOLVING BOTH HANDS, UNSPECIFIED WHETHER RHEUMATOID FACTOR PRESENT: ICD-10-CM

## 2023-04-17 PROCEDURE — 1160F RVW MEDS BY RX/DR IN RCRD: CPT | Mod: CPTII,S$GLB,, | Performed by: INTERNAL MEDICINE

## 2023-04-17 PROCEDURE — 4010F PR ACE/ARB THEARPY RXD/TAKEN: ICD-10-PCS | Mod: CPTII,S$GLB,, | Performed by: INTERNAL MEDICINE

## 2023-04-17 PROCEDURE — 1101F PR PT FALLS ASSESS DOC 0-1 FALLS W/OUT INJ PAST YR: ICD-10-PCS | Mod: CPTII,S$GLB,, | Performed by: INTERNAL MEDICINE

## 2023-04-17 PROCEDURE — 3288F FALL RISK ASSESSMENT DOCD: CPT | Mod: CPTII,S$GLB,, | Performed by: INTERNAL MEDICINE

## 2023-04-17 PROCEDURE — 93005 ELECTROCARDIOGRAM TRACING: CPT | Mod: S$GLB,,, | Performed by: INTERNAL MEDICINE

## 2023-04-17 PROCEDURE — 3008F BODY MASS INDEX DOCD: CPT | Mod: CPTII,S$GLB,, | Performed by: INTERNAL MEDICINE

## 2023-04-17 PROCEDURE — 3008F PR BODY MASS INDEX (BMI) DOCUMENTED: ICD-10-PCS | Mod: CPTII,S$GLB,, | Performed by: INTERNAL MEDICINE

## 2023-04-17 PROCEDURE — 99999 PR PBB SHADOW E&M-EST. PATIENT-LVL V: ICD-10-PCS | Mod: PBBFAC,,, | Performed by: INTERNAL MEDICINE

## 2023-04-17 PROCEDURE — 3078F DIAST BP <80 MM HG: CPT | Mod: CPTII,S$GLB,, | Performed by: INTERNAL MEDICINE

## 2023-04-17 PROCEDURE — 1159F MED LIST DOCD IN RCRD: CPT | Mod: CPTII,S$GLB,, | Performed by: INTERNAL MEDICINE

## 2023-04-17 PROCEDURE — 93010 ELECTROCARDIOGRAM REPORT: CPT | Mod: S$GLB,,, | Performed by: INTERNAL MEDICINE

## 2023-04-17 PROCEDURE — 3288F PR FALLS RISK ASSESSMENT DOCUMENTED: ICD-10-PCS | Mod: CPTII,S$GLB,, | Performed by: INTERNAL MEDICINE

## 2023-04-17 PROCEDURE — 1126F PR PAIN SEVERITY QUANTIFIED, NO PAIN PRESENT: ICD-10-PCS | Mod: CPTII,S$GLB,, | Performed by: INTERNAL MEDICINE

## 2023-04-17 PROCEDURE — 3078F PR MOST RECENT DIASTOLIC BLOOD PRESSURE < 80 MM HG: ICD-10-PCS | Mod: CPTII,S$GLB,, | Performed by: INTERNAL MEDICINE

## 2023-04-17 PROCEDURE — 1160F PR REVIEW ALL MEDS BY PRESCRIBER/CLIN PHARMACIST DOCUMENTED: ICD-10-PCS | Mod: CPTII,S$GLB,, | Performed by: INTERNAL MEDICINE

## 2023-04-17 PROCEDURE — 3075F SYST BP GE 130 - 139MM HG: CPT | Mod: CPTII,S$GLB,, | Performed by: INTERNAL MEDICINE

## 2023-04-17 PROCEDURE — 1126F AMNT PAIN NOTED NONE PRSNT: CPT | Mod: CPTII,S$GLB,, | Performed by: INTERNAL MEDICINE

## 2023-04-17 PROCEDURE — 99214 OFFICE O/P EST MOD 30 MIN: CPT | Mod: S$GLB,,, | Performed by: INTERNAL MEDICINE

## 2023-04-17 PROCEDURE — 93010 EKG 12-LEAD: ICD-10-PCS | Mod: S$GLB,,, | Performed by: INTERNAL MEDICINE

## 2023-04-17 PROCEDURE — 1101F PT FALLS ASSESS-DOCD LE1/YR: CPT | Mod: CPTII,S$GLB,, | Performed by: INTERNAL MEDICINE

## 2023-04-17 PROCEDURE — 99214 PR OFFICE/OUTPT VISIT, EST, LEVL IV, 30-39 MIN: ICD-10-PCS | Mod: S$GLB,,, | Performed by: INTERNAL MEDICINE

## 2023-04-17 PROCEDURE — 3075F PR MOST RECENT SYSTOLIC BLOOD PRESS GE 130-139MM HG: ICD-10-PCS | Mod: CPTII,S$GLB,, | Performed by: INTERNAL MEDICINE

## 2023-04-17 PROCEDURE — 1159F PR MEDICATION LIST DOCUMENTED IN MEDICAL RECORD: ICD-10-PCS | Mod: CPTII,S$GLB,, | Performed by: INTERNAL MEDICINE

## 2023-04-17 PROCEDURE — 93005 EKG 12-LEAD: ICD-10-PCS | Mod: S$GLB,,, | Performed by: INTERNAL MEDICINE

## 2023-04-17 PROCEDURE — 4010F ACE/ARB THERAPY RXD/TAKEN: CPT | Mod: CPTII,S$GLB,, | Performed by: INTERNAL MEDICINE

## 2023-04-17 PROCEDURE — 99999 PR PBB SHADOW E&M-EST. PATIENT-LVL V: CPT | Mod: PBBFAC,,, | Performed by: INTERNAL MEDICINE

## 2023-04-17 RX ORDER — TRAZODONE HYDROCHLORIDE 50 MG/1
50 TABLET ORAL NIGHTLY PRN
Qty: 30 TABLET | Refills: 0 | Status: SHIPPED | OUTPATIENT
Start: 2023-04-17 | End: 2023-05-22 | Stop reason: SDUPTHER

## 2023-04-17 RX ORDER — BUSPIRONE HYDROCHLORIDE 5 MG/1
TABLET ORAL
Qty: 120 TABLET | Refills: 5 | Status: SHIPPED | OUTPATIENT
Start: 2023-04-17 | End: 2023-06-26 | Stop reason: SDUPTHER

## 2023-04-17 NOTE — PROGRESS NOTES
HISTORY OF PRESENT ILLNESS:  Lula De Leon is a 67 y.o. female who presents to the clinic today for Follow-up and Anxiety (X4 months)    Last seen by me 11/2022.    Anxiety  Increased anxiety for 4 months.  Life changes include: Daughter and her family moving to [place].   retired.  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  No panic attacks but notes episodes when heart rate feels elevated.  Hard to sleep at night b/c up thinking about things.  Sleeps about 4-5 hrs per night and used to sleep better before.    Walks 2 miles per day 3-4 times per week.  No change in diet.    SLEEP ROUTINE:  Activity the hour prior to sleep: iPad  Bed partner:    Time to bed:  8:30-9 pm  Lights off:  8:30-9 pm  Sleep onset latency:  1.5-2 hrs  Disruptions or awakenings:  hear things in the house  Wakeup time:   4:30 am.   Perceived sleep quality:  fair  Daytime naps:   no  Weekend sleep routine:  same  Caffeine use:  2 cups in am  Alcohol use:  one glass of wine two to three times per week.  Exercise habit:   Walks 2 miles per day 3-4 times per week.     RA  Stable on Rinvoq, MTX and hydroxychloroqine and followed by Dr. Hooks.  Symptoms improved.    Congenital dilatation brachiocephalic artery  Being seen by vascular surgery with plan for CTA.    PAST MEDICAL HISTORY:  Past Medical History:   Diagnosis Date    Basal cell carcinoma of right ear 10/18/2022    Rheumatoid arthritis        PAST SURGICAL HISTORY:  Past Surgical History:   Procedure Laterality Date    BREAST LUMPECTOMY Right     exc biopsy    BREAST SURGERY      lobectomy 2017 - [facility]    CARPAL TUNNEL RELEASE Right     SINUS SURGERY  2017    Dr. Christel FLORES and Dr. Chilel       SOCIAL HISTORY:  Social History     Socioeconomic History    Marital status:    Occupational History    Occupation: retired - used to make prosthesis and dental implants   Tobacco Use    Smoking status: Former     Packs/day: 1.00     Years: 6.00     Pack years:  6.00     Types: Cigarettes     Quit date:      Years since quittin.3    Smokeless tobacco: Never   Substance and Sexual Activity    Alcohol use: Yes       FAMILY HISTORY:  Family History   Problem Relation Age of Onset    Arthritis Mother     Aneurysm Mother 64    Heart attack Father 70    Epilepsy Sister        ALLERGIES AND MEDICATIONS: updated and reviewed.  Review of patient's allergies indicates:  No Known Allergies  Medication List with Changes/Refills   Current Medications    ASCORBIC ACID, VITAMIN C, (VITAMIN C) 500 MG TABLET    Take 500 mg by mouth.    ATORVASTATIN (LIPITOR) 20 MG TABLET    Take 1 tablet (20 mg total) by mouth once daily.    BETA CAROTENE 84122 UNIT CAPSULE    Take 25,000 Units by mouth.    CALCIUM CARBONATE (OS-ONEIDA) 600 MG CALCIUM (1,500 MG) TAB    Take 600 mg by mouth.    CETIRIZINE (ZYRTEC) 10 MG TABLET    Take 10 mg by mouth.    CYANOCOBALAMIN (VITAMIN B-12) 1000 MCG TABLET    Take 1,000 mcg by mouth.    DICLOFENAC SODIUM (VOLTAREN) 1 % GEL    Apply 2 g topically 4 (four) times daily.    FAWAD PRIM-LINOLEIC-GAMOLENIC AC (PRIMROSE OIL) 1,000 MG CAP    Take by mouth.    FLAXSEED OIL OIL    800 mg by Other route.    FOLIC ACID (FOLVITE) 1 MG TABLET    Take 1 tablet (1 mg total) by mouth once daily.    HYDROXYCHLOROQUINE (PLAQUENIL) 200 MG TABLET    Take 1 tablet (200 mg total) by mouth once daily.    IRON BISGLY,PS-FA-B-C#12-SUCC 65 MG-65 MG -1,000 MCG (24) TAB    Take by mouth.    LOSARTAN (COZAAR) 25 MG TABLET    Take 2 tablets (50 mg total) by mouth once daily.    MAGNESIUM CARBONATE ORAL    Take 600 mg by mouth.    METHOTREXATE 2.5 MG TAB    Take 6 tablets (15 mg total) by mouth every 7 days.    OMEGA-3 FATTY ACIDS/FISH OIL (FISH OIL-OMEGA-3 FATTY ACIDS) 300-1,000 MG CAPSULE    Take 2 g by mouth.    ONDANSETRON (ZOFRAN-ODT) 4 MG TBDL    Take 1 tablet (4 mg total) by mouth every 8 (eight) hours as needed (nausea).    PANTOPRAZOLE (PROTONIX) 40 MG TABLET    Take 1 tablet (40 mg  total) by mouth once daily.    SULFASALAZINE (AZULFIDINE) 500 MG TAB    Take 1 tablet (500 mg total) by mouth 2 (two) times a day.    UPADACITINIB (RINVOQ) 15 MG 24 HR TABLET    Take 1 tablet (15 mg total) by mouth once daily.    VITAMIN E 400 UNIT CAPSULE    Take 400 Units by mouth.    ZINC GLUCONATE 50 MG TABLET    Take 50 mg by mouth.          CARE TEAM:  Patient Care Team:  Sylvain Haywood MD as PCP - General (Internal Medicine)  Eliel Rodrigez MD as Consulting Physician (Ophthalmology)  [facility]         REVIEW OF SYSTEMS:  Review of Systems   Constitutional:  Negative for chills and fever.   HENT:  Negative for congestion and postnasal drip.    Eyes:  Negative for photophobia and visual disturbance.   Respiratory:  Negative for cough and shortness of breath.    Cardiovascular:  Negative for chest pain and palpitations.   Gastrointestinal:  Negative for nausea and vomiting.   Genitourinary:  Negative for dysuria and frequency.   Musculoskeletal:  Negative for arthralgias and back pain.   Neurological:  Negative for light-headedness and headaches.   Psychiatric/Behavioral:  Positive for dysphoric mood and sleep disturbance. The patient is nervous/anxious.        PHYSICAL EXAM:   Vitals:    04/17/23 1443   BP: 138/78   Pulse: 85   Temp: 98.7 °F (37.1 °C)             Body mass index is 28.01 kg/m².     General appearance - alert, well appearing, and in no distress and oriented to person, place, and time  Mental status - normal mood, behavior, speech, dress, motor activity, and thought processes  Eyes - sclera anicteric, left eye normal, right eye normal  Chest - no tachypnea, retractions or cyanosis  Neurological - alert, oriented, normal speech, no focal findings or movement disorder noted  Extremities - no pedal edema, no clubbing or cyanosis      ASSESSMENT AND PLAN:  Anxiety  -     busPIRone (BUSPAR) 5 MG Tab; Take one tab po bid for 2 weeks and if no side effects,  may increase to two tab po bid after that.  Dispense: 120 tablet; Refill: 5  -     EKG 12-lead  - Close follow up in 6-8 week to assess response.    Insomnia, unspecified type  -     traZODone (DESYREL) 50 MG tablet; Take 1 tablet (50 mg total) by mouth nightly as needed for Insomnia.  Dispense: 30 tablet; Refill: 0    Hyperlipidemia, unspecified hyperlipidemia type  Aortic atherosclerosis       - Stable on statin.    Rheumatoid arthritis involving both hands, unspecified whether rheumatoid factor present       - Stable on current medication and follows with rheumatology.    Colon cancer screening      - Patient was counseled and educated regarding colon cancer screening guidelines with option for colonoscopy, Cologuard and Fit Kit explained.  She voiced understanding and declined to schedule screening.    Essential hypertension      - Stable on current medication.    Asymptomatic postmenopausal state  -     DXA Bone Density Axial Skeleton 1 or more sites; Future; Expected date: 04/17/2023    Healthcare maintenance  -     DXA Bone Density Axial Skeleton 1 or more sites; Future; Expected date: 04/17/2023  -     Mammo Digital Screening Bilat w/ [place]; Future; Expected date: 05/04/2023  -     Hemoglobin A1C; Future; Expected date: 05/17/2023  -     Comprehensive Metabolic Panel; Future; Expected date: 05/17/2023  -     Lipid Panel; Future; Expected date: 05/17/2023  -     CBC Auto Differential; Future; Expected date: 05/17/2023  -     TSH; Future; Expected date: 05/17/2023  -     Vitamin D; Future; Expected date: 05/17/2023  -     Iron and TIBC; Future; Expected date: 05/17/2023  -     Ferritin; Future; Expected date: 05/17/2023    Congenital dilatation of brachiocephalic artery       - Follow up with vascular surgery.    Encounter for screening for malignant neoplasm of breast, unspecified screening modality  -     Mammo Digital Screening Bilat w/ [place]; Future; Expected date: 05/04/2023    Encounter for screening  mammogram for malignant neoplasm of breast  -     Mammo Digital Screening Bilat w/ [place]; Future; Expected date: 05/04/2023    Abnormal finding of blood chemistry, unspecified  -     Hemoglobin A1C; Future; Expected date: 05/17/2023  -     CBC Auto Differential; Future; Expected date: 05/17/2023    Encounter for lipid screening for cardiovascular disease  -     Lipid Panel; Future; Expected date: 05/17/2023              Follow up 6-8 weeks or sooner as needed.

## 2023-04-25 ENCOUNTER — PATIENT MESSAGE (OUTPATIENT)
Dept: ADMINISTRATIVE | Facility: HOSPITAL | Age: 68
End: 2023-04-25
Payer: MEDICARE

## 2023-05-03 DIAGNOSIS — I10 ESSENTIAL HYPERTENSION: ICD-10-CM

## 2023-05-06 RX ORDER — LOSARTAN POTASSIUM 25 MG/1
50 TABLET ORAL DAILY
Qty: 180 TABLET | Refills: 1 | Status: SHIPPED | OUTPATIENT
Start: 2023-05-06 | End: 2023-12-03 | Stop reason: SDUPTHER

## 2023-05-22 ENCOUNTER — LAB VISIT (OUTPATIENT)
Dept: LAB | Facility: HOSPITAL | Age: 68
End: 2023-05-22
Attending: INTERNAL MEDICINE
Payer: MEDICARE

## 2023-05-22 DIAGNOSIS — R79.9 ABNORMAL FINDING OF BLOOD CHEMISTRY, UNSPECIFIED: ICD-10-CM

## 2023-05-22 DIAGNOSIS — G47.00 INSOMNIA, UNSPECIFIED TYPE: ICD-10-CM

## 2023-05-22 DIAGNOSIS — Z00.00 HEALTHCARE MAINTENANCE: ICD-10-CM

## 2023-05-22 DIAGNOSIS — Z13.220 ENCOUNTER FOR LIPID SCREENING FOR CARDIOVASCULAR DISEASE: ICD-10-CM

## 2023-05-22 DIAGNOSIS — Z13.6 ENCOUNTER FOR LIPID SCREENING FOR CARDIOVASCULAR DISEASE: ICD-10-CM

## 2023-05-22 LAB
25(OH)D3+25(OH)D2 SERPL-MCNC: 47 NG/ML (ref 30–96)
ALBUMIN SERPL BCP-MCNC: 4.2 G/DL (ref 3.5–5.2)
ALP SERPL-CCNC: 41 U/L (ref 55–135)
ALT SERPL W/O P-5'-P-CCNC: 49 U/L (ref 10–44)
ANION GAP SERPL CALC-SCNC: 7 MMOL/L (ref 8–16)
AST SERPL-CCNC: 38 U/L (ref 10–40)
BASOPHILS # BLD AUTO: 0.02 K/UL (ref 0–0.2)
BASOPHILS NFR BLD: 0.4 % (ref 0–1.9)
BILIRUB SERPL-MCNC: 0.6 MG/DL (ref 0.1–1)
BUN SERPL-MCNC: 25 MG/DL (ref 8–23)
CALCIUM SERPL-MCNC: 9.5 MG/DL (ref 8.7–10.5)
CHLORIDE SERPL-SCNC: 106 MMOL/L (ref 95–110)
CHOLEST SERPL-MCNC: 184 MG/DL (ref 120–199)
CHOLEST/HDLC SERPL: 2.7 {RATIO} (ref 2–5)
CO2 SERPL-SCNC: 28 MMOL/L (ref 23–29)
CREAT SERPL-MCNC: 1 MG/DL (ref 0.5–1.4)
DIFFERENTIAL METHOD: ABNORMAL
EOSINOPHIL # BLD AUTO: 0.1 K/UL (ref 0–0.5)
EOSINOPHIL NFR BLD: 1.3 % (ref 0–8)
ERYTHROCYTE [DISTWIDTH] IN BLOOD BY AUTOMATED COUNT: 13.6 % (ref 11.5–14.5)
EST. GFR  (NO RACE VARIABLE): >60 ML/MIN/1.73 M^2
ESTIMATED AVG GLUCOSE: 100 MG/DL (ref 68–131)
FERRITIN SERPL-MCNC: 92 NG/ML (ref 20–300)
GLUCOSE SERPL-MCNC: 99 MG/DL (ref 70–110)
HBA1C MFR BLD: 5.1 % (ref 4–5.6)
HCT VFR BLD AUTO: 36.4 % (ref 37–48.5)
HDLC SERPL-MCNC: 68 MG/DL (ref 40–75)
HDLC SERPL: 37 % (ref 20–50)
HGB BLD-MCNC: 12.1 G/DL (ref 12–16)
IMM GRANULOCYTES # BLD AUTO: 0.04 K/UL (ref 0–0.04)
IMM GRANULOCYTES NFR BLD AUTO: 0.9 % (ref 0–0.5)
IRON SERPL-MCNC: 161 UG/DL (ref 30–160)
LDLC SERPL CALC-MCNC: 99.4 MG/DL (ref 63–159)
LYMPHOCYTES # BLD AUTO: 1.6 K/UL (ref 1–4.8)
LYMPHOCYTES NFR BLD: 34.3 % (ref 18–48)
MCH RBC QN AUTO: 32.4 PG (ref 27–31)
MCHC RBC AUTO-ENTMCNC: 33.2 G/DL (ref 32–36)
MCV RBC AUTO: 98 FL (ref 82–98)
MONOCYTES # BLD AUTO: 0.5 K/UL (ref 0.3–1)
MONOCYTES NFR BLD: 10.3 % (ref 4–15)
NEUTROPHILS # BLD AUTO: 2.5 K/UL (ref 1.8–7.7)
NEUTROPHILS NFR BLD: 52.8 % (ref 38–73)
NONHDLC SERPL-MCNC: 116 MG/DL
NRBC BLD-RTO: 0 /100 WBC
PLATELET # BLD AUTO: 252 K/UL (ref 150–450)
PMV BLD AUTO: 10.4 FL (ref 9.2–12.9)
POTASSIUM SERPL-SCNC: 4.5 MMOL/L (ref 3.5–5.1)
PROT SERPL-MCNC: 7.3 G/DL (ref 6–8.4)
RBC # BLD AUTO: 3.73 M/UL (ref 4–5.4)
SATURATED IRON: 36 % (ref 20–50)
SODIUM SERPL-SCNC: 141 MMOL/L (ref 136–145)
TOTAL IRON BINDING CAPACITY: 448 UG/DL (ref 250–450)
TRANSFERRIN SERPL-MCNC: 303 MG/DL (ref 200–375)
TRIGL SERPL-MCNC: 83 MG/DL (ref 30–150)
TSH SERPL DL<=0.005 MIU/L-ACNC: 1.61 UIU/ML (ref 0.4–4)
WBC # BLD AUTO: 4.64 K/UL (ref 3.9–12.7)

## 2023-05-22 PROCEDURE — 85025 COMPLETE CBC W/AUTO DIFF WBC: CPT | Performed by: INTERNAL MEDICINE

## 2023-05-22 PROCEDURE — 83036 HEMOGLOBIN GLYCOSYLATED A1C: CPT | Performed by: INTERNAL MEDICINE

## 2023-05-22 PROCEDURE — 84443 ASSAY THYROID STIM HORMONE: CPT | Mod: GA | Performed by: INTERNAL MEDICINE

## 2023-05-22 PROCEDURE — 84466 ASSAY OF TRANSFERRIN: CPT | Performed by: INTERNAL MEDICINE

## 2023-05-22 PROCEDURE — 80061 LIPID PANEL: CPT | Performed by: INTERNAL MEDICINE

## 2023-05-22 PROCEDURE — 82728 ASSAY OF FERRITIN: CPT | Performed by: INTERNAL MEDICINE

## 2023-05-22 PROCEDURE — 36415 COLL VENOUS BLD VENIPUNCTURE: CPT | Mod: PN | Performed by: INTERNAL MEDICINE

## 2023-05-22 PROCEDURE — 82306 VITAMIN D 25 HYDROXY: CPT | Mod: GA | Performed by: INTERNAL MEDICINE

## 2023-05-22 PROCEDURE — 80053 COMPREHEN METABOLIC PANEL: CPT | Performed by: INTERNAL MEDICINE

## 2023-05-22 NOTE — TELEPHONE ENCOUNTER
No care due was identified.  [facility] Embedded Care Due Messages. Reference number: [id number].   5/22/2023 3:35:38 PM CDT

## 2023-05-23 RX ORDER — TRAZODONE HYDROCHLORIDE 50 MG/1
50 TABLET ORAL NIGHTLY PRN
Qty: 30 TABLET | Refills: 5 | Status: SHIPPED | OUTPATIENT
Start: 2023-05-23 | End: 2023-08-23 | Stop reason: SDUPTHER

## 2023-05-26 ENCOUNTER — PATIENT MESSAGE (OUTPATIENT)
Dept: FAMILY MEDICINE | Facility: CLINIC | Age: 68
End: 2023-05-26
Payer: MEDICARE

## 2023-05-29 DIAGNOSIS — K21.9 GASTROESOPHAGEAL REFLUX DISEASE, UNSPECIFIED WHETHER ESOPHAGITIS PRESENT: ICD-10-CM

## 2023-05-29 NOTE — TELEPHONE ENCOUNTER
No care due was identified.  [facility] Embedded Care Due Messages. Reference number: [id number].   5/29/2023 4:45:42 PM CDT

## 2023-06-01 RX ORDER — PANTOPRAZOLE SODIUM 40 MG/1
40 TABLET, DELAYED RELEASE ORAL DAILY
Qty: 90 TABLET | Refills: 2 | Status: SHIPPED | OUTPATIENT
Start: 2023-06-01 | End: 2023-09-26

## 2023-06-26 ENCOUNTER — TELEPHONE (OUTPATIENT)
Dept: FAMILY MEDICINE | Facility: CLINIC | Age: 68
End: 2023-06-26
Payer: MEDICARE

## 2023-06-26 ENCOUNTER — OFFICE VISIT (OUTPATIENT)
Dept: FAMILY MEDICINE | Facility: CLINIC | Age: 68
End: 2023-06-26
Payer: MEDICARE

## 2023-06-26 DIAGNOSIS — F41.9 ANXIETY: ICD-10-CM

## 2023-06-26 PROCEDURE — 1159F MED LIST DOCD IN RCRD: CPT | Mod: CPTII,95,, | Performed by: INTERNAL MEDICINE

## 2023-06-26 PROCEDURE — 1159F PR MEDICATION LIST DOCUMENTED IN MEDICAL RECORD: ICD-10-PCS | Mod: CPTII,95,, | Performed by: INTERNAL MEDICINE

## 2023-06-26 PROCEDURE — 99441 PR PHYSICIAN TELEPHONE EVALUATION 5-10 MIN: CPT | Mod: 95,,, | Performed by: INTERNAL MEDICINE

## 2023-06-26 PROCEDURE — 4010F PR ACE/ARB THEARPY RXD/TAKEN: ICD-10-PCS | Mod: CPTII,95,, | Performed by: INTERNAL MEDICINE

## 2023-06-26 PROCEDURE — 1160F PR REVIEW ALL MEDS BY PRESCRIBER/CLIN PHARMACIST DOCUMENTED: ICD-10-PCS | Mod: CPTII,95,, | Performed by: INTERNAL MEDICINE

## 2023-06-26 PROCEDURE — 3044F PR MOST RECENT HEMOGLOBIN A1C LEVEL <7.0%: ICD-10-PCS | Mod: CPTII,95,, | Performed by: INTERNAL MEDICINE

## 2023-06-26 PROCEDURE — 1160F RVW MEDS BY RX/DR IN RCRD: CPT | Mod: CPTII,95,, | Performed by: INTERNAL MEDICINE

## 2023-06-26 PROCEDURE — 3044F HG A1C LEVEL LT 7.0%: CPT | Mod: CPTII,95,, | Performed by: INTERNAL MEDICINE

## 2023-06-26 PROCEDURE — 4010F ACE/ARB THERAPY RXD/TAKEN: CPT | Mod: CPTII,95,, | Performed by: INTERNAL MEDICINE

## 2023-06-26 PROCEDURE — 99441 PR PHYSICIAN TELEPHONE EVALUATION 5-10 MIN: ICD-10-PCS | Mod: 95,,, | Performed by: INTERNAL MEDICINE

## 2023-06-26 RX ORDER — BUSPIRONE HYDROCHLORIDE 5 MG/1
TABLET ORAL
Qty: 120 TABLET | Refills: 5 | Status: SHIPPED | OUTPATIENT
Start: 2023-06-26 | End: 2023-09-26

## 2023-06-26 NOTE — TELEPHONE ENCOUNTER
----- Message from Sylvain Haywood MD sent at 6/26/2023 11:57 AM CDT -----  Please call to offer follow up at 3 months.

## 2023-06-26 NOTE — PROGRESS NOTES
Established Patient - Audio Only Telehealth Visit     The patient location is: [place]  The chief complaint leading to consultation is: Anxiety  Visit type: Virtual visit with audio only (telephone)  Total time spent with patient: 5 minutes       The reason for the audio only service rather than synchronous audio and video virtual visit was related to technical difficulties or patient preference/necessity.     Each patient to whom I provide medical services by telemedicine is:  (1) informed of the relationship between the physician and patient and the respective role of any other health care provider with respect to management of the patient; and (2) notified that they may decline to receive medical services by telemedicine and may withdraw from such care at any time. Patient verbally consented to receive this service via voice-only telephone call.       HPI: Last seen by me 4/2023.  Prescribed buspirone and trazodone.  Notes improvement in symptoms     Assessment and plan:    Anxiety  -     busPIRone (BUSPAR) 5 MG Tab; Take one tab po bid for 2 weeks and if no side effects, may increase to two tab po bid after that.  Dispense: 120 tablet; Refill: 5                 This service was not originating from a related E/M service provided within the previous 7 days nor will  to an E/M service or procedure within the next 24 hours or my soonest available appointment.  Prevailing standard of care was able to be met in this audio-only visit.

## 2023-06-29 ENCOUNTER — HOSPITAL ENCOUNTER (OUTPATIENT)
Dept: RADIOLOGY | Facility: HOSPITAL | Age: 68
Discharge: HOME | End: 2023-06-29
Attending: SURGERY
Payer: MEDICARE

## 2023-06-29 DIAGNOSIS — Q27.39: ICD-10-CM

## 2023-06-29 PROCEDURE — 25500020 PHARM REV CODE 255: Performed by: SURGERY

## 2023-06-29 PROCEDURE — 70498 CTA NECK: ICD-10-PCS | Mod: 26,,, | Performed by: RADIOLOGY

## 2023-06-29 PROCEDURE — 70498 CT ANGIOGRAPHY NECK: CPT | Mod: TC

## 2023-06-29 PROCEDURE — 70498 CT ANGIOGRAPHY NECK: CPT | Mod: 26,,, | Performed by: RADIOLOGY

## 2023-06-29 RX ADMIN — IOHEXOL 75 ML: 350 INJECTION, SOLUTION INTRAVENOUS at 09:06

## 2023-07-03 ENCOUNTER — OFFICE VISIT (OUTPATIENT)
Dept: RHEUMATOLOGY | Facility: CLINIC | Age: 68
End: 2023-07-03
Payer: MEDICARE

## 2023-07-03 VITALS
OXYGEN SATURATION: 98 % | HEART RATE: 81 BPM | BODY MASS INDEX: 27.36 KG/M2 | DIASTOLIC BLOOD PRESSURE: 83 MMHG | SYSTOLIC BLOOD PRESSURE: 151 MMHG | HEIGHT: 63 IN | WEIGHT: 154.38 LBS

## 2023-07-03 DIAGNOSIS — Z79.899 IMMUNODEFICIENCY DUE TO TREATMENT WITH IMMUNOSUPPRESSIVE MEDICATION: ICD-10-CM

## 2023-07-03 DIAGNOSIS — M06.9 RHEUMATOID ARTHRITIS INVOLVING BOTH HANDS, UNSPECIFIED WHETHER RHEUMATOID FACTOR PRESENT: Primary | ICD-10-CM

## 2023-07-03 DIAGNOSIS — D84.821 IMMUNODEFICIENCY DUE TO TREATMENT WITH IMMUNOSUPPRESSIVE MEDICATION: ICD-10-CM

## 2023-07-03 DIAGNOSIS — M15.9 PRIMARY OSTEOARTHRITIS INVOLVING MULTIPLE JOINTS: ICD-10-CM

## 2023-07-03 DIAGNOSIS — Z71.89 COUNSELING AND COORDINATION OF CARE: ICD-10-CM

## 2023-07-03 PROCEDURE — 3079F DIAST BP 80-89 MM HG: CPT | Mod: CPTII,S$GLB,, | Performed by: INTERNAL MEDICINE

## 2023-07-03 PROCEDURE — 4010F PR ACE/ARB THEARPY RXD/TAKEN: ICD-10-PCS | Mod: CPTII,S$GLB,, | Performed by: INTERNAL MEDICINE

## 2023-07-03 PROCEDURE — 3044F PR MOST RECENT HEMOGLOBIN A1C LEVEL <7.0%: ICD-10-PCS | Mod: CPTII,S$GLB,, | Performed by: INTERNAL MEDICINE

## 2023-07-03 PROCEDURE — 3077F PR MOST RECENT SYSTOLIC BLOOD PRESSURE >= 140 MM HG: ICD-10-PCS | Mod: CPTII,S$GLB,, | Performed by: INTERNAL MEDICINE

## 2023-07-03 PROCEDURE — 99214 PR OFFICE/OUTPT VISIT, EST, LEVL IV, 30-39 MIN: ICD-10-PCS | Mod: S$GLB,,, | Performed by: INTERNAL MEDICINE

## 2023-07-03 PROCEDURE — 1101F PT FALLS ASSESS-DOCD LE1/YR: CPT | Mod: CPTII,S$GLB,, | Performed by: INTERNAL MEDICINE

## 2023-07-03 PROCEDURE — 3288F FALL RISK ASSESSMENT DOCD: CPT | Mod: CPTII,S$GLB,, | Performed by: INTERNAL MEDICINE

## 2023-07-03 PROCEDURE — 3079F PR MOST RECENT DIASTOLIC BLOOD PRESSURE 80-89 MM HG: ICD-10-PCS | Mod: CPTII,S$GLB,, | Performed by: INTERNAL MEDICINE

## 2023-07-03 PROCEDURE — 3077F SYST BP >= 140 MM HG: CPT | Mod: CPTII,S$GLB,, | Performed by: INTERNAL MEDICINE

## 2023-07-03 PROCEDURE — 3288F PR FALLS RISK ASSESSMENT DOCUMENTED: ICD-10-PCS | Mod: CPTII,S$GLB,, | Performed by: INTERNAL MEDICINE

## 2023-07-03 PROCEDURE — 1126F PR PAIN SEVERITY QUANTIFIED, NO PAIN PRESENT: ICD-10-PCS | Mod: CPTII,S$GLB,, | Performed by: INTERNAL MEDICINE

## 2023-07-03 PROCEDURE — 1159F MED LIST DOCD IN RCRD: CPT | Mod: CPTII,S$GLB,, | Performed by: INTERNAL MEDICINE

## 2023-07-03 PROCEDURE — 1126F AMNT PAIN NOTED NONE PRSNT: CPT | Mod: CPTII,S$GLB,, | Performed by: INTERNAL MEDICINE

## 2023-07-03 PROCEDURE — 99999 PR PBB SHADOW E&M-EST. PATIENT-LVL V: ICD-10-PCS | Mod: PBBFAC,,, | Performed by: INTERNAL MEDICINE

## 2023-07-03 PROCEDURE — 99214 OFFICE O/P EST MOD 30 MIN: CPT | Mod: S$GLB,,, | Performed by: INTERNAL MEDICINE

## 2023-07-03 PROCEDURE — 1101F PR PT FALLS ASSESS DOC 0-1 FALLS W/OUT INJ PAST YR: ICD-10-PCS | Mod: CPTII,S$GLB,, | Performed by: INTERNAL MEDICINE

## 2023-07-03 PROCEDURE — 1159F PR MEDICATION LIST DOCUMENTED IN MEDICAL RECORD: ICD-10-PCS | Mod: CPTII,S$GLB,, | Performed by: INTERNAL MEDICINE

## 2023-07-03 PROCEDURE — 3008F BODY MASS INDEX DOCD: CPT | Mod: CPTII,S$GLB,, | Performed by: INTERNAL MEDICINE

## 2023-07-03 PROCEDURE — 4010F ACE/ARB THERAPY RXD/TAKEN: CPT | Mod: CPTII,S$GLB,, | Performed by: INTERNAL MEDICINE

## 2023-07-03 PROCEDURE — 3044F HG A1C LEVEL LT 7.0%: CPT | Mod: CPTII,S$GLB,, | Performed by: INTERNAL MEDICINE

## 2023-07-03 PROCEDURE — 3008F PR BODY MASS INDEX (BMI) DOCUMENTED: ICD-10-PCS | Mod: CPTII,S$GLB,, | Performed by: INTERNAL MEDICINE

## 2023-07-03 PROCEDURE — 99999 PR PBB SHADOW E&M-EST. PATIENT-LVL V: CPT | Mod: PBBFAC,,, | Performed by: INTERNAL MEDICINE

## 2023-07-03 RX ORDER — METHOTREXATE 2.5 MG/1
15 TABLET ORAL
Qty: 84 TABLET | Refills: 1 | Status: SHIPPED | OUTPATIENT
Start: 2023-07-03 | End: 2024-02-21

## 2023-07-03 NOTE — PROGRESS NOTES
Answers submitted by the patient for this visit:  Rheumatology Questionnaire (Submitted on 6/30/2023)  fever: No  eye redness: No  mouth sores: No  headaches: No  shortness of breath: No  chest pain: No  trouble swallowing: No  diarrhea: No  constipation: No  unexpected weight change: No  genital sore: No  dysuria: No  During the last 3 days, have you had a skin rash?: No  Bruises or bleeds easily: No  cough: No

## 2023-07-03 NOTE — PROGRESS NOTES
RHEUMATOLOGY OUTPATIENT CLINIC NOTE    7/3/2023    Attending Rheumatologist: Ata Hooks  Primary Care Provider: Sylvain Haywood MD   Physician Requesting Consultation: No referring provider defined for this encounter.  Chief Complaint/Reason For Consultation:  Joint Pain        Subjective:       JOSSY De Leon is a 67 y.o. White female with Hx of Seropositive RA who comes to [facility] care.   Patient diagnosed two years ago. Reports it started in the wrist as swelling and pain, could barely move it. Then was seen by a Rheumatologist which placed her on therapy eventually escalating to Triple Therapy (SSZ, MTX, HCQ) for control. Was on MTX 8 tabs, SSZ 4 and decreased to MTX 4 and SSZ 2 in August due to Renal Function.   Patient reports good response on current therapy. Denies joint swelling, tenderness. Reports about 10-15 of morning stiffness. Reports also lower back and knee pain. Most typically with use. Was prior on NSAIDs but stopped, now on Tylenol with good relief.   FHx: Possible Mom, and Aunt with RA     Today  Patient here for follow up.   Last visit RA management continued. Has tapered off SSZ now with no recurrence, doing well, tolerating meds.     Review of Systems   Constitutional:  Negative for appetite change, chills, fatigue, fever and unexpected weight change.   HENT:  Negative for nasal congestion, ear discharge, ear pain, hearing loss, mouth sores, nosebleeds, sneezing, sore throat, tinnitus and trouble swallowing.    Eyes:  Negative for photophobia, pain, discharge, redness, itching and visual disturbance.   Respiratory:  Negative for cough, chest tightness, shortness of breath and wheezing.    Cardiovascular:  Negative for chest pain, palpitations and leg swelling.   Gastrointestinal:  Negative for abdominal distention, abdominal pain, blood in stool, constipation, diarrhea, nausea and vomiting.   Endocrine: Negative for cold intolerance, heat intolerance, polydipsia,  polyphagia and polyuria.   Genitourinary:  Negative for difficulty urinating, dyspareunia, dysuria, flank pain, frequency, genital sores, hematuria, menstrual problem, pelvic pain, urgency, vaginal bleeding, vaginal discharge, vaginal pain and vaginal dryness.   Musculoskeletal:  Positive for arthralgias and joint swelling. Negative for back pain, gait problem, leg pain, myalgias, neck pain, neck stiffness and joint deformity.   Integumentary:  Negative for pallor and rash.   Neurological:  Negative for dizziness, seizures, weakness, light-headedness, numbness and headaches.   Hematological:  Negative for adenopathy. Does not bruise/bleed easily.   Psychiatric/Behavioral:  Negative for confusion, decreased concentration and sleep disturbance. The patient is not nervous/anxious.    All other systems reviewed and are negative.       Chronic comorbid conditions affecting medical decision making today:  Past Medical History:   Diagnosis Date    Basal cell carcinoma of right ear 10/18/2022    Rheumatoid arthritis      Past Surgical History:   Procedure Laterality Date    BREAST LUMPECTOMY Right     exc biopsy    BREAST SURGERY      lobectomy 2017 - [facility]    CARPAL TUNNEL RELEASE Right     SINUS SURGERY      Dr. Kearney BR and Dr. Chilel     Family History   Problem Relation Age of Onset    Arthritis Mother     Aneurysm Mother 64    Heart attack Father 70    Epilepsy Sister      Social History     Substance and Sexual Activity   Alcohol Use Yes     Social History     Tobacco Use   Smoking Status Former    Packs/day: 1.00    Years: 6.00    Pack years: 6.00    Types: Cigarettes    Quit date:     Years since quittin.5   Smokeless Tobacco Never     Social History     Substance and Sexual Activity   Drug Use Not on file       Current Outpatient Medications:     ascorbic acid, vitamin C, (VITAMIN C) 500 MG tablet, Take 500 mg by mouth., Disp: , Rfl:     atorvastatin (LIPITOR) 20 MG tablet, Take 1  tablet (20 mg total) by mouth once daily., Disp: 90 tablet, Rfl: 3    beta carotene 44903 UNIT capsule, Take 25,000 Units by mouth., Disp: , Rfl:     busPIRone (BUSPAR) 5 MG Tab, Take one tab po bid for 2 weeks and if no side effects, may increase to two tab po bid after that., Disp: 120 tablet, Rfl: 5    calcium carbonate (OS-ONEIDA) 600 mg calcium (1,500 mg) Tab, Take 600 mg by mouth., Disp: , Rfl:     cetirizine (ZYRTEC) 10 MG tablet, Take 10 mg by mouth., Disp: , Rfl:     cyanocobalamin (VITAMIN B-12) 1000 MCG tablet, Take 1,000 mcg by mouth., Disp: , Rfl:     diclofenac sodium (VOLTAREN) 1 % Gel, Apply 2 g topically 4 (four) times daily., Disp: 1 Tube, Rfl: 3    richa prim-linoleic-gamolenic ac (PRIMROSE OIL) 1,000 mg Cap, Take by mouth., Disp: , Rfl:     flaxseed oil Oil, 800 mg by Other route., Disp: , Rfl:     folic acid (FOLVITE) 1 MG tablet, Take 1 tablet (1 mg total) by mouth once daily., Disp: 90 tablet, Rfl: 1    hydrOXYchloroQUINE (PLAQUENIL) 200 mg tablet, Take 1 tablet (200 mg total) by mouth once daily., Disp: 90 tablet, Rfl: 1    iron bisgly,ps-FA-B-C#12-succ 65 mg-65 mg -1,000 mcg (24) Tab, Take by mouth., Disp: , Rfl:     losartan (COZAAR) 25 MG tablet, Take 2 tablets (50 mg total) by mouth once daily., Disp: 180 tablet, Rfl: 1    MAGNESIUM CARBONATE ORAL, Take 600 mg by mouth., Disp: , Rfl:     omega-3 fatty acids/fish oil (FISH OIL-OMEGA-3 FATTY ACIDS) 300-1,000 mg capsule, Take 2 g by mouth., Disp: , Rfl:     ondansetron (ZOFRAN-ODT) 4 MG TbDL, Take 1 tablet (4 mg total) by mouth every 8 (eight) hours as needed (nausea)., Disp: 15 tablet, Rfl: 0    pantoprazole (PROTONIX) 40 MG tablet, Take 1 tablet (40 mg total) by mouth once daily., Disp: 90 tablet, Rfl: 2    pantoprazole (PROTONIX) 40 MG tablet, Take 1 tablet (40 mg total) by mouth once daily., Disp: 90 tablet, Rfl: 3    sulfaSALAzine (AZULFIDINE) 500 mg Tab, Take 1 tablet (500 mg total) by mouth 2 (two) times a day., Disp: 180 tablet, Rfl: 1     traZODone (DESYREL) 50 MG tablet, Take 1 tablet (50 mg total) by mouth nightly as needed for Insomnia., Disp: 30 tablet, Rfl: 5    upadacitinib (RINVOQ) 15 mg 24 hr tablet, Take 1 tablet (15 mg total) by mouth once daily., Disp: 30 tablet, Rfl: 11    vitamin E 400 UNIT capsule, Take 400 Units by mouth., Disp: , Rfl:     zinc gluconate 50 mg tablet, Take 50 mg by mouth., Disp: , Rfl:     methotrexate 2.5 MG Tab, Take 6 tablets (15 mg total) by mouth every 7 days., Disp: 84 tablet, Rfl: 1     Objective:         Vitals:    07/03/23 1147   BP: (!) 151/83   Pulse: 81     Physical Exam   Constitutional: She is oriented to person, place, and time.   HENT:   Head: Normocephalic and atraumatic.   Right Ear: External ear normal.   Left Ear: External ear normal.   Nose: Nose normal.   Mouth/Throat: Oropharynx is clear and moist.   Eyes: Pupils are equal, round, and reactive to light. Conjunctivae are normal.   Cardiovascular: Normal rate and regular rhythm.   Pulmonary/Chest: Effort normal and breath sounds normal.   Abdominal: Soft. Bowel sounds are normal.   Musculoskeletal:      Right shoulder: Normal.      Left shoulder: Normal.      Right elbow: Normal.      Left elbow: Normal.      Right wrist: Normal.      Left wrist: Normal.      Cervical back: Normal range of motion and neck supple.      Right knee: Normal.      Left knee: Normal.      Comments: Can make fist, no synovitis  Knee crepitus     Neurological: She is alert and oriented to person, place, and time.   Skin: No rash noted. No erythema.   Psychiatric: Mood and affect normal.       Right Side Rheumatological Exam     Examination finds the shoulder, elbow, wrist, knee, 1st PIP, 1st MCP, 2nd PIP, 2nd MCP, 3rd PIP, 3rd MCP, 4th PIP, 4th MCP, 5th PIP and 5th MCP normal.    Knee Exam   Patellofemoral Crepitus: positive    Left Side Rheumatological Exam     Examination finds the shoulder, elbow, wrist, knee, 1st PIP, 1st MCP, 2nd PIP, 2nd MCP, 3rd PIP, 3rd MCP, 4th  PIP, 4th MCP, 5th PIP and 5th MCP normal.    Knee Exam     Patellofemoral Crepitus: positive        Reviewed old and all outside pertinent medical records available.    All lab results personally reviewed and interpreted by me.  Lab Results   Component Value Date    WBC 4.64 05/22/2023    HGB 12.1 05/22/2023    HCT 36.4 (L) 05/22/2023    MCV 98 05/22/2023    MCH 32.4 (H) 05/22/2023    MCHC 33.2 05/22/2023    RDW 13.6 05/22/2023     05/22/2023    MPV 10.4 05/22/2023       Lab Results   Component Value Date     06/29/2023    K 4.5 06/29/2023     06/29/2023    CO2 27 06/29/2023     06/29/2023    BUN 19 06/29/2023    CALCIUM 9.5 06/29/2023    PROT 7.3 05/22/2023    ALBUMIN 4.2 05/22/2023    BILITOT 0.6 05/22/2023    AST 38 05/22/2023    ALKPHOS 41 (L) 05/22/2023    ALT 49 (H) 05/22/2023       No results found for: COLORU, APPEARANCEUA, SPECGRAV, PHUR, PHUA, PROTEINUA, GLUCOSEU, KETONESU, BLOODU, LEUKOCYTESUR, NITRITE, UROBILINOGEN    Lab Results   Component Value Date    CRP 3.0 11/03/2020       Lab Results   Component Value Date    SEDRATE 20 11/03/2020       Lab Results   Component Value Date    .0 (H) 11/03/2020    SEDRATE 20 11/03/2020       No components found for: 25OHVITDTOT, 52XSPQKO5, 94PZJRQO6, METHODNOTE    No results found for: URICACID    No components found for: TSPOTTB    Imaging:  All imaging reviewed and independently interpreted by me.       ASSESSMENT / PLAN:     Lula De Leon is a 67 y.o. White female with:      1. Rheumatoid arthritis involving both hands, unspecified whether rheumatoid factor present  - Patient with known diagnosis of seropositive RA  - in remission  - recent labs reviewed  - tapered off SSZ  - will now taper off HCQ   - continue Rinvoq  - continue MTX 15mg weekly    - reassurance     2. Low Back and Knee Pain likely OA   -continue Tylenol PRN  -Reassurance and Exercise     3. DMARD Toxicity Monitoring   -monitor labs  -Daily FA  -no live  vaccines  -vaccines per guidelines   -immunosuppression/infectious precautions discussed     4. Other specified counseling  - over 10 minutes spent regarding below topics:  - Immunization counseling done.  - Nutrition and exercise counseling.  - Limitation of alcohol consumption.  - Regular exercise:  Aerobic and resistance.  - Medication counseling provided.      Follow up in about 3 months (around 10/3/2023).    Method of contact with patient concerns: [facility]    Disclaimer:  This note is prepared using voice recognition software and as such is likely to have errors and has not been proof read. Please contact me for questions.     Time spent: 30 minutes in face to face discussion concerning diagnosis, prognosis, review of lab and test results, benefits of treatment as well as management of disease, counseling of patient and coordination of care between various health care providers.  Greater than half the time spent was used for coordination of care and counseling of patient.    Ata Hooks M.D.  Rheumatology Department   [facility]

## 2023-08-08 ENCOUNTER — HOSPITAL ENCOUNTER (OUTPATIENT)
Dept: RADIOLOGY | Facility: HOSPITAL | Age: 68
Discharge: HOME | End: 2023-08-08
Attending: INTERNAL MEDICINE
Payer: MEDICARE

## 2023-08-08 VITALS — BODY MASS INDEX: 27.34 KG/M2 | WEIGHT: 154.31 LBS | HEIGHT: 63 IN

## 2023-08-08 DIAGNOSIS — Z12.39 ENCOUNTER FOR SCREENING FOR MALIGNANT NEOPLASM OF BREAST, UNSPECIFIED SCREENING MODALITY: ICD-10-CM

## 2023-08-08 DIAGNOSIS — Z00.00 HEALTHCARE MAINTENANCE: ICD-10-CM

## 2023-08-08 DIAGNOSIS — Z12.31 ENCOUNTER FOR SCREENING MAMMOGRAM FOR MALIGNANT NEOPLASM OF BREAST: ICD-10-CM

## 2023-08-08 PROCEDURE — 77063 MAMMO DIGITAL SCREENING BILAT WITH TOMO: ICD-10-PCS | Mod: 26,,, | Performed by: RADIOLOGY

## 2023-08-08 PROCEDURE — 77067 MAMMO DIGITAL SCREENING BILAT WITH TOMO: ICD-10-PCS | Mod: 26,,, | Performed by: RADIOLOGY

## 2023-08-08 PROCEDURE — 77067 SCR MAMMO BI INCL CAD: CPT | Mod: TC

## 2023-08-08 PROCEDURE — 77063 BREAST TOMOSYNTHESIS BI: CPT | Mod: 26,,, | Performed by: RADIOLOGY

## 2023-08-08 PROCEDURE — 77067 SCR MAMMO BI INCL CAD: CPT | Mod: 26,,, | Performed by: RADIOLOGY

## 2023-08-23 ENCOUNTER — PATIENT MESSAGE (OUTPATIENT)
Dept: FAMILY MEDICINE | Facility: CLINIC | Age: 68
End: 2023-08-23
Payer: MEDICARE

## 2023-08-23 DIAGNOSIS — G47.00 INSOMNIA, UNSPECIFIED TYPE: ICD-10-CM

## 2023-08-23 NOTE — TELEPHONE ENCOUNTER
No care due was identified.  [facility] Embedded Care Due Messages. Reference number: [id number].   8/23/2023 9:24:05 AM CDT

## 2023-08-24 RX ORDER — TRAZODONE HYDROCHLORIDE 50 MG/1
50 TABLET ORAL NIGHTLY PRN
Qty: 30 TABLET | Refills: 5 | Status: SHIPPED | OUTPATIENT
Start: 2023-08-24 | End: 2024-08-23

## 2023-09-26 ENCOUNTER — PATIENT MESSAGE (OUTPATIENT)
Dept: FAMILY MEDICINE | Facility: CLINIC | Age: 68
End: 2023-09-26

## 2023-09-26 ENCOUNTER — OFFICE VISIT (OUTPATIENT)
Dept: FAMILY MEDICINE | Facility: CLINIC | Age: 68
End: 2023-09-26
Payer: MEDICARE

## 2023-09-26 VITALS
HEART RATE: 79 BPM | TEMPERATURE: 98 F | OXYGEN SATURATION: 96 % | DIASTOLIC BLOOD PRESSURE: 74 MMHG | HEIGHT: 63 IN | SYSTOLIC BLOOD PRESSURE: 132 MMHG | WEIGHT: 158.5 LBS | BODY MASS INDEX: 28.08 KG/M2

## 2023-09-26 DIAGNOSIS — Z00.00 HEALTHCARE MAINTENANCE: ICD-10-CM

## 2023-09-26 DIAGNOSIS — Z12.11 COLON CANCER SCREENING: ICD-10-CM

## 2023-09-26 DIAGNOSIS — E78.5 HYPERLIPIDEMIA, UNSPECIFIED HYPERLIPIDEMIA TYPE: ICD-10-CM

## 2023-09-26 DIAGNOSIS — M06.9 RHEUMATOID ARTHRITIS INVOLVING BOTH HANDS, UNSPECIFIED WHETHER RHEUMATOID FACTOR PRESENT: ICD-10-CM

## 2023-09-26 DIAGNOSIS — I70.0 AORTIC ATHEROSCLEROSIS: ICD-10-CM

## 2023-09-26 DIAGNOSIS — Z78.0 ASYMPTOMATIC POSTMENOPAUSAL STATE: ICD-10-CM

## 2023-09-26 DIAGNOSIS — E66.3 OVERWEIGHT (BMI 25.0-29.9): ICD-10-CM

## 2023-09-26 DIAGNOSIS — F41.9 ANXIETY: ICD-10-CM

## 2023-09-26 DIAGNOSIS — Q27.39: ICD-10-CM

## 2023-09-26 DIAGNOSIS — I10 ESSENTIAL HYPERTENSION: Primary | ICD-10-CM

## 2023-09-26 DIAGNOSIS — R79.9 ABNORMAL FINDING OF BLOOD CHEMISTRY, UNSPECIFIED: ICD-10-CM

## 2023-09-26 PROCEDURE — 1159F MED LIST DOCD IN RCRD: CPT | Mod: CPTII,S$GLB,, | Performed by: INTERNAL MEDICINE

## 2023-09-26 PROCEDURE — 3288F PR FALLS RISK ASSESSMENT DOCUMENTED: ICD-10-PCS | Mod: CPTII,S$GLB,, | Performed by: INTERNAL MEDICINE

## 2023-09-26 PROCEDURE — 3075F PR MOST RECENT SYSTOLIC BLOOD PRESS GE 130-139MM HG: ICD-10-PCS | Mod: CPTII,S$GLB,, | Performed by: INTERNAL MEDICINE

## 2023-09-26 PROCEDURE — 1126F PR PAIN SEVERITY QUANTIFIED, NO PAIN PRESENT: ICD-10-PCS | Mod: CPTII,S$GLB,, | Performed by: INTERNAL MEDICINE

## 2023-09-26 PROCEDURE — 99999 PR PBB SHADOW E&M-EST. PATIENT-LVL V: CPT | Mod: PBBFAC,,, | Performed by: INTERNAL MEDICINE

## 2023-09-26 PROCEDURE — 1126F AMNT PAIN NOTED NONE PRSNT: CPT | Mod: CPTII,S$GLB,, | Performed by: INTERNAL MEDICINE

## 2023-09-26 PROCEDURE — 1101F PR PT FALLS ASSESS DOC 0-1 FALLS W/OUT INJ PAST YR: ICD-10-PCS | Mod: CPTII,S$GLB,, | Performed by: INTERNAL MEDICINE

## 2023-09-26 PROCEDURE — 3044F HG A1C LEVEL LT 7.0%: CPT | Mod: CPTII,S$GLB,, | Performed by: INTERNAL MEDICINE

## 2023-09-26 PROCEDURE — 99999 PR PBB SHADOW E&M-EST. PATIENT-LVL V: ICD-10-PCS | Mod: PBBFAC,,, | Performed by: INTERNAL MEDICINE

## 2023-09-26 PROCEDURE — 99214 OFFICE O/P EST MOD 30 MIN: CPT | Mod: S$GLB,,, | Performed by: INTERNAL MEDICINE

## 2023-09-26 PROCEDURE — 1160F PR REVIEW ALL MEDS BY PRESCRIBER/CLIN PHARMACIST DOCUMENTED: ICD-10-PCS | Mod: CPTII,S$GLB,, | Performed by: INTERNAL MEDICINE

## 2023-09-26 PROCEDURE — 3078F PR MOST RECENT DIASTOLIC BLOOD PRESSURE < 80 MM HG: ICD-10-PCS | Mod: CPTII,S$GLB,, | Performed by: INTERNAL MEDICINE

## 2023-09-26 PROCEDURE — 1159F PR MEDICATION LIST DOCUMENTED IN MEDICAL RECORD: ICD-10-PCS | Mod: CPTII,S$GLB,, | Performed by: INTERNAL MEDICINE

## 2023-09-26 PROCEDURE — 4010F ACE/ARB THERAPY RXD/TAKEN: CPT | Mod: CPTII,S$GLB,, | Performed by: INTERNAL MEDICINE

## 2023-09-26 PROCEDURE — 3075F SYST BP GE 130 - 139MM HG: CPT | Mod: CPTII,S$GLB,, | Performed by: INTERNAL MEDICINE

## 2023-09-26 PROCEDURE — 3288F FALL RISK ASSESSMENT DOCD: CPT | Mod: CPTII,S$GLB,, | Performed by: INTERNAL MEDICINE

## 2023-09-26 PROCEDURE — 3008F BODY MASS INDEX DOCD: CPT | Mod: CPTII,S$GLB,, | Performed by: INTERNAL MEDICINE

## 2023-09-26 PROCEDURE — 3008F PR BODY MASS INDEX (BMI) DOCUMENTED: ICD-10-PCS | Mod: CPTII,S$GLB,, | Performed by: INTERNAL MEDICINE

## 2023-09-26 PROCEDURE — 99214 PR OFFICE/OUTPT VISIT, EST, LEVL IV, 30-39 MIN: ICD-10-PCS | Mod: S$GLB,,, | Performed by: INTERNAL MEDICINE

## 2023-09-26 PROCEDURE — 4010F PR ACE/ARB THEARPY RXD/TAKEN: ICD-10-PCS | Mod: CPTII,S$GLB,, | Performed by: INTERNAL MEDICINE

## 2023-09-26 PROCEDURE — 1101F PT FALLS ASSESS-DOCD LE1/YR: CPT | Mod: CPTII,S$GLB,, | Performed by: INTERNAL MEDICINE

## 2023-09-26 PROCEDURE — 3078F DIAST BP <80 MM HG: CPT | Mod: CPTII,S$GLB,, | Performed by: INTERNAL MEDICINE

## 2023-09-26 PROCEDURE — 1160F RVW MEDS BY RX/DR IN RCRD: CPT | Mod: CPTII,S$GLB,, | Performed by: INTERNAL MEDICINE

## 2023-09-26 PROCEDURE — 3044F PR MOST RECENT HEMOGLOBIN A1C LEVEL <7.0%: ICD-10-PCS | Mod: CPTII,S$GLB,, | Performed by: INTERNAL MEDICINE

## 2023-09-26 RX ORDER — PHENTERMINE HYDROCHLORIDE 37.5 MG/1
37.5 TABLET ORAL
Qty: 30 TABLET | Refills: 2 | Status: SHIPPED | OUTPATIENT
Start: 2023-09-26 | End: 2023-12-25

## 2023-09-26 RX ORDER — BUSPIRONE HYDROCHLORIDE 10 MG/1
10 TABLET ORAL 2 TIMES DAILY
Qty: 60 TABLET | Refills: 11 | Status: SHIPPED | OUTPATIENT
Start: 2023-09-26 | End: 2024-09-25

## 2023-09-26 NOTE — PROGRESS NOTES
HISTORY OF PRESENT ILLNESS:  Lula De Leon is a 67 y.o. female who presents to the clinic today for Follow-up (Not fasting, declined ful vaccination )    Last seen by me 2023.    Notes weight gain in the last year.  Last year 150-153 lb and today 158 lb.  Activity level is same per patient and notes she has increased walking.  Walks 2 miles 3-4 times per week - increased exercise.  Bkfst: one boiled egg, 2 cups of coffee/sweetened creamer small amount.  Lunch: nothing  Dinner: chicken breast, salad, veggies.  One 5 oz glass of wine three times per week.  No Dessert.  No late snacking.  Notes she has used Adipex in the past without side effects and is is interested in using this medication.    Anxiety  Prescribed buspirone and trazodone at last visit.    RA  Stable on Rinvoq, MTX and followed by Dr. Hooks.  At last visit, plan to taper off Plaquenil and still currently taking.  Symptoms improved.     Congenital dilatation brachiocephalic artery  Next visit vascular surgery 10/24/23.    PAST MEDICAL HISTORY:  Past Medical History:   Diagnosis Date    Basal cell carcinoma of right ear 10/18/2022    Rheumatoid arthritis        PAST SURGICAL HISTORY:  Past Surgical History:   Procedure Laterality Date    BREAST LUMPECTOMY Right     exc biopsy    BREAST SURGERY      lobectomy 2017 - [facility]    CARPAL TUNNEL RELEASE Right     SINUS SURGERY  2017    Dr. Christel FLORES and Dr. Chilel       SOCIAL HISTORY:  Social History     Socioeconomic History    Marital status:    Occupational History    Occupation: retired - used to make prosthesis and dental implants   Tobacco Use    Smoking status: Former     Current packs/day: 0.00     Average packs/day: 1 pack/day for 6.0 years (6.0 ttl pk-yrs)     Types: Cigarettes     Start date:      Quit date:      Years since quittin.7    Smokeless tobacco: Never   Substance and Sexual Activity    Alcohol use: Yes     Social Determinants of Health     Financial  Resource Strain: Low Risk  (10/19/2020)    Overall Financial Resource Strain (CARDIA)     Difficulty of Paying Living Expenses: Not hard at all   Food Insecurity: No Food Insecurity (10/19/2020)    Hunger Vital Sign     Worried About Running Out of Food in the Last Year: Never true     Ran Out of Food in the Last Year: Never true   Transportation Needs: No Transportation Needs (10/19/2020)    PRAPARE - Transportation     Lack of Transportation (Medical): No     Lack of Transportation (Non-Medical): No   Physical Activity: Inactive (10/19/2020)    Exercise Vital Sign     Days of Exercise per Week: 0 days     Minutes of Exercise per Session: 0 min   Stress: No Stress Concern Present (10/19/2020)    Citizen of [place] [employer] [employer] - Occupational Stress Questionnaire     Feeling of Stress : Only a little   Social Connections: Unknown (10/19/2020)    Social Connection and Isolation Panel [NHANES]     Frequency of Communication with Friends and Family: More than three times a week     Frequency of Social Gatherings with Friends and Family: More than three times a week     Active Member of Clubs or Organizations: No     Attends Club or Organization Meetings: Never     Marital Status:        FAMILY HISTORY:  Family History   Problem Relation Age of Onset    Arthritis Mother     Aneurysm Mother 64    Heart attack Father 70    Epilepsy Sister        ALLERGIES AND MEDICATIONS: updated and reviewed.  Review of patient's allergies indicates:  No Known Allergies  Medication List with Changes/Refills   New Medications    BUSPIRONE (BUSPAR) 10 MG TABLET    Take 1 tablet (10 mg total) by mouth 2 (two) times daily.    PHENTERMINE (ADIPEX-P) 37.5 MG TABLET    Take 1 tablet (37.5 mg total) by mouth before breakfast.   Current Medications    ASCORBIC ACID, VITAMIN C, (VITAMIN C) 500 MG TABLET    Take 500 mg by mouth.    ATORVASTATIN (LIPITOR) 20 MG TABLET    Take 1 tablet (20 mg total) by mouth once daily.    BETA  CAROTENE 50109 UNIT CAPSULE    Take 25,000 Units by mouth.    CALCIUM CARBONATE (OS-ONEIDA) 600 MG CALCIUM (1,500 MG) TAB    Take 600 mg by mouth.    CETIRIZINE (ZYRTEC) 10 MG TABLET    Take 10 mg by mouth.    CYANOCOBALAMIN (VITAMIN B-12) 1000 MCG TABLET    Take 1,000 mcg by mouth.    DICLOFENAC SODIUM (VOLTAREN) 1 % GEL    Apply 2 g topically 4 (four) times daily.    FAWAD PRIM-LINOLEIC-GAMOLENIC AC (PRIMROSE OIL) 1,000 MG CAP    Take by mouth.    FLAXSEED OIL OIL    800 mg by Other route.    FOLIC ACID (FOLVITE) 1 MG TABLET    Take 1 tablet (1 mg total) by mouth once daily.    HYDROXYCHLOROQUINE (PLAQUENIL) 200 MG TABLET    Take 1 tablet (200 mg total) by mouth once daily.    IRON BISGLY,PS-FA-B-C#12-SUCC 65 MG-65 MG -1,000 MCG (24) TAB    Take by mouth.    LOSARTAN (COZAAR) 25 MG TABLET    Take 2 tablets (50 mg total) by mouth once daily.    MAGNESIUM CARBONATE ORAL    Take 600 mg by mouth.    METHOTREXATE 2.5 MG TAB    Take 6 tablets (15 mg total) by mouth every 7 days.    OMEGA-3 FATTY ACIDS/FISH OIL (FISH OIL-OMEGA-3 FATTY ACIDS) 300-1,000 MG CAPSULE    Take 2 g by mouth.    ONDANSETRON (ZOFRAN-ODT) 4 MG TBDL    Take 1 tablet (4 mg total) by mouth every 8 (eight) hours as needed (nausea).    PANTOPRAZOLE (PROTONIX) 40 MG TABLET    Take 1 tablet (40 mg total) by mouth once daily.    TRAZODONE (DESYREL) 50 MG TABLET    Take 1 tablet (50 mg total) by mouth nightly as needed for Insomnia.    UPADACITINIB (RINVOQ) 15 MG 24 HR TABLET    Take 1 tablet (15 mg total) by mouth once daily.    VITAMIN E 400 UNIT CAPSULE    Take 400 Units by mouth.    ZINC GLUCONATE 50 MG TABLET    Take 50 mg by mouth.   Discontinued Medications    BUSPIRONE (BUSPAR) 5 MG TAB    Take one tab po bid for 2 weeks and if no side effects, may increase to two tab po bid after that.    PANTOPRAZOLE (PROTONIX) 40 MG TABLET    Take 1 tablet (40 mg total) by mouth once daily.    SULFASALAZINE (AZULFIDINE) 500 MG TAB    Take 1 tablet (500 mg total) by  mouth 2 (two) times a day.          CARE TEAM:  Patient Care Team:  Sylvain Haywood MD as PCP - General (Internal Medicine)  Eliel Rodrigez MD as Consulting Physician (Ophthalmology)  [facility]         REVIEW OF SYSTEMS:  Review of Systems   Constitutional:  Positive for unexpected weight change. Negative for chills and fever.   HENT:  Negative for congestion and postnasal drip.    Eyes:  Negative for photophobia and visual disturbance.   Respiratory:  Negative for cough and shortness of breath.    Cardiovascular:  Negative for chest pain, palpitations and leg swelling.   Gastrointestinal:  Negative for nausea and vomiting.   Genitourinary:  Negative for dysuria and frequency.   Musculoskeletal:  Negative for arthralgias and back pain.   Neurological:  Negative for light-headedness and headaches.   Psychiatric/Behavioral:  Negative for dysphoric mood. The patient is not nervous/anxious.          PHYSICAL EXAM:   Vitals:    09/26/23 1138   BP: 132/74   Pulse: 79   Temp: 98.3 °F (36.8 °C)             Body mass index is 28.53 kg/m².     General appearance - alert, well appearing, and in no distress and oriented to person, place, and time  Mental status - normal mood, behavior, speech, dress, motor activity, and thought processes  Eyes - sclera anicteric, left eye normal, right eye normal  Chest - clear to auscultation, no wheezes, rales or rhonchi, symmetric air entry, no tachypnea, retractions or cyanosis; prominent pulsatile area is stable in lower neck  Heart - normal rate, regular rhythm, normal S1, S2, no murmurs, rubs, clicks or gallops  Neurological - alert, oriented, normal speech, no focal findings or movement disorder noted  Extremities - no pedal edema noted, intact peripheral pulses      ASSESSMENT AND PLAN:  Essential hypertension       - Stable on current medication.    Anxiety  -     busPIRone (BUSPAR) 10 MG tablet; Take 1 tablet (10 mg total) by mouth 2  (two) times daily.  Dispense: 60 tablet; Refill: 11  - Symptoms are well controlled at this time.    Healthcare maintenance  -     Hemoglobin A1C; Future; Expected date: 05/01/2024  -     Comprehensive Metabolic Panel; Future; Expected date: 05/01/2024  -     Lipid Panel; Future; Expected date: 05/01/2024  -     CBC Auto Differential; Future; Expected date: 05/01/2024  -     TSH; Future; Expected date: 05/01/2024  -     Vitamin D; Future; Expected date: 05/01/2024    Congenital dilatation of brachiocephalic artery       - Follow up vascular surgery.    Hyperlipidemia, unspecified hyperlipidemia type  Aortic atherosclerosis       - Stable on statin.    Rheumatoid arthritis involving both hands, unspecified whether rheumatoid factor present       - Stable on current management.    Colon cancer screening      - Patient was counseled on age appropriate screening with colonoscopy or Cologuard but she declined today.    Asymptomatic postmenopausal state       - Counseled for recommendation of DXA as age appropriate screening measure but she declined.    Overweight (BMI 25.0-29.9)  -     phentermine (ADIPEX-P) 37.5 mg tablet; Take 1 tablet (37.5 mg total) by mouth before breakfast.  Dispense: 30 tablet; Refill: 2  - Patient was counseled for intake of adequate fruits/vegetables and whole grains in the diet with adequate physical activity 150-300 min per week.  - Counseled regarding side effects of Adipex, including palpitations and anxiety.  Anxiety is at present well controlled.  Patient voiced understanding and is to notify us as well as discontinue medication if any side effects occur.  She notes that she has previously taken this medication without side effects.    Abnormal finding of blood chemistry, unspecified  -     Hemoglobin A1C; Future; Expected date: 05/01/2024  -     Lipid Panel; Future; Expected date: 05/01/2024  -     CBC Auto Differential; Future; Expected date: 05/01/2024  -     TSH; Future; Expected date:  05/01/2024  -     Vitamin D; Future; Expected date: 05/01/2024              Follow up 3 months or sooner as needed.

## 2023-10-24 ENCOUNTER — LAB VISIT (OUTPATIENT)
Dept: LAB | Facility: HOSPITAL | Age: 68
End: 2023-10-24
Attending: INTERNAL MEDICINE
Payer: MEDICARE

## 2023-10-24 ENCOUNTER — OFFICE VISIT (OUTPATIENT)
Dept: RHEUMATOLOGY | Facility: CLINIC | Age: 68
End: 2023-10-24
Payer: MEDICARE

## 2023-10-24 VITALS
WEIGHT: 157.5 LBS | DIASTOLIC BLOOD PRESSURE: 86 MMHG | SYSTOLIC BLOOD PRESSURE: 161 MMHG | HEIGHT: 63 IN | HEART RATE: 78 BPM | BODY MASS INDEX: 27.91 KG/M2 | OXYGEN SATURATION: 99 %

## 2023-10-24 DIAGNOSIS — Z79.899 IMMUNODEFICIENCY DUE TO TREATMENT WITH IMMUNOSUPPRESSIVE MEDICATION: ICD-10-CM

## 2023-10-24 DIAGNOSIS — Z71.89 COUNSELING AND COORDINATION OF CARE: ICD-10-CM

## 2023-10-24 DIAGNOSIS — M06.9 RHEUMATOID ARTHRITIS, INVOLVING UNSPECIFIED SITE, UNSPECIFIED WHETHER RHEUMATOID FACTOR PRESENT: Primary | ICD-10-CM

## 2023-10-24 DIAGNOSIS — D84.821 IMMUNODEFICIENCY DUE TO TREATMENT WITH IMMUNOSUPPRESSIVE MEDICATION: ICD-10-CM

## 2023-10-24 DIAGNOSIS — M06.9 RHEUMATOID ARTHRITIS, INVOLVING UNSPECIFIED SITE, UNSPECIFIED WHETHER RHEUMATOID FACTOR PRESENT: ICD-10-CM

## 2023-10-24 DIAGNOSIS — M15.9 PRIMARY OSTEOARTHRITIS INVOLVING MULTIPLE JOINTS: ICD-10-CM

## 2023-10-24 LAB
ALBUMIN SERPL BCP-MCNC: 4.2 G/DL (ref 3.5–5.2)
ALP SERPL-CCNC: 41 U/L (ref 55–135)
ALT SERPL W/O P-5'-P-CCNC: 42 U/L (ref 10–44)
ANION GAP SERPL CALC-SCNC: 10 MMOL/L (ref 8–16)
AST SERPL-CCNC: 38 U/L (ref 10–40)
BASOPHILS # BLD AUTO: 0.02 K/UL (ref 0–0.2)
BASOPHILS NFR BLD: 0.4 % (ref 0–1.9)
BILIRUB SERPL-MCNC: 0.3 MG/DL (ref 0.1–1)
BUN SERPL-MCNC: 18 MG/DL (ref 8–23)
CALCIUM SERPL-MCNC: 9.5 MG/DL (ref 8.7–10.5)
CHLORIDE SERPL-SCNC: 106 MMOL/L (ref 95–110)
CO2 SERPL-SCNC: 24 MMOL/L (ref 23–29)
CREAT SERPL-MCNC: 0.8 MG/DL (ref 0.5–1.4)
DIFFERENTIAL METHOD: ABNORMAL
EOSINOPHIL # BLD AUTO: 0.1 K/UL (ref 0–0.5)
EOSINOPHIL NFR BLD: 1 % (ref 0–8)
ERYTHROCYTE [DISTWIDTH] IN BLOOD BY AUTOMATED COUNT: 13.3 % (ref 11.5–14.5)
EST. GFR  (NO RACE VARIABLE): >60 ML/MIN/1.73 M^2
GLUCOSE SERPL-MCNC: 91 MG/DL (ref 70–110)
HCT VFR BLD AUTO: 37.6 % (ref 37–48.5)
HGB BLD-MCNC: 11.8 G/DL (ref 12–16)
IMM GRANULOCYTES # BLD AUTO: 0.01 K/UL (ref 0–0.04)
IMM GRANULOCYTES NFR BLD AUTO: 0.2 % (ref 0–0.5)
LYMPHOCYTES # BLD AUTO: 2.2 K/UL (ref 1–4.8)
LYMPHOCYTES NFR BLD: 42.7 % (ref 18–48)
MCH RBC QN AUTO: 33.1 PG (ref 27–31)
MCHC RBC AUTO-ENTMCNC: 31.4 G/DL (ref 32–36)
MCV RBC AUTO: 106 FL (ref 82–98)
MONOCYTES # BLD AUTO: 0.7 K/UL (ref 0.3–1)
MONOCYTES NFR BLD: 12.6 % (ref 4–15)
NEUTROPHILS # BLD AUTO: 2.3 K/UL (ref 1.8–7.7)
NEUTROPHILS NFR BLD: 43.1 % (ref 38–73)
NRBC BLD-RTO: 0 /100 WBC
PLATELET # BLD AUTO: 308 K/UL (ref 150–450)
PMV BLD AUTO: 9.6 FL (ref 9.2–12.9)
POTASSIUM SERPL-SCNC: 4.2 MMOL/L (ref 3.5–5.1)
PROT SERPL-MCNC: 7.2 G/DL (ref 6–8.4)
RBC # BLD AUTO: 3.56 M/UL (ref 4–5.4)
SODIUM SERPL-SCNC: 140 MMOL/L (ref 136–145)
WBC # BLD AUTO: 5.24 K/UL (ref 3.9–12.7)

## 2023-10-24 PROCEDURE — 99214 PR OFFICE/OUTPT VISIT, EST, LEVL IV, 30-39 MIN: ICD-10-PCS | Mod: S$GLB,,, | Performed by: INTERNAL MEDICINE

## 2023-10-24 PROCEDURE — 3008F BODY MASS INDEX DOCD: CPT | Mod: CPTII,S$GLB,, | Performed by: INTERNAL MEDICINE

## 2023-10-24 PROCEDURE — 3044F PR MOST RECENT HEMOGLOBIN A1C LEVEL <7.0%: ICD-10-PCS | Mod: CPTII,S$GLB,, | Performed by: INTERNAL MEDICINE

## 2023-10-24 PROCEDURE — 3079F PR MOST RECENT DIASTOLIC BLOOD PRESSURE 80-89 MM HG: ICD-10-PCS | Mod: CPTII,S$GLB,, | Performed by: INTERNAL MEDICINE

## 2023-10-24 PROCEDURE — 85025 COMPLETE CBC W/AUTO DIFF WBC: CPT | Performed by: INTERNAL MEDICINE

## 2023-10-24 PROCEDURE — 3079F DIAST BP 80-89 MM HG: CPT | Mod: CPTII,S$GLB,, | Performed by: INTERNAL MEDICINE

## 2023-10-24 PROCEDURE — 4010F PR ACE/ARB THEARPY RXD/TAKEN: ICD-10-PCS | Mod: CPTII,S$GLB,, | Performed by: INTERNAL MEDICINE

## 2023-10-24 PROCEDURE — 3077F SYST BP >= 140 MM HG: CPT | Mod: CPTII,S$GLB,, | Performed by: INTERNAL MEDICINE

## 2023-10-24 PROCEDURE — 36415 COLL VENOUS BLD VENIPUNCTURE: CPT | Mod: PO | Performed by: INTERNAL MEDICINE

## 2023-10-24 PROCEDURE — 1126F PR PAIN SEVERITY QUANTIFIED, NO PAIN PRESENT: ICD-10-PCS | Mod: CPTII,S$GLB,, | Performed by: INTERNAL MEDICINE

## 2023-10-24 PROCEDURE — 1126F AMNT PAIN NOTED NONE PRSNT: CPT | Mod: CPTII,S$GLB,, | Performed by: INTERNAL MEDICINE

## 2023-10-24 PROCEDURE — 80053 COMPREHEN METABOLIC PANEL: CPT | Performed by: INTERNAL MEDICINE

## 2023-10-24 PROCEDURE — 4010F ACE/ARB THERAPY RXD/TAKEN: CPT | Mod: CPTII,S$GLB,, | Performed by: INTERNAL MEDICINE

## 2023-10-24 PROCEDURE — 99999 PR PBB SHADOW E&M-EST. PATIENT-LVL IV: ICD-10-PCS | Mod: PBBFAC,,, | Performed by: INTERNAL MEDICINE

## 2023-10-24 PROCEDURE — 3288F FALL RISK ASSESSMENT DOCD: CPT | Mod: CPTII,S$GLB,, | Performed by: INTERNAL MEDICINE

## 2023-10-24 PROCEDURE — 1159F MED LIST DOCD IN RCRD: CPT | Mod: CPTII,S$GLB,, | Performed by: INTERNAL MEDICINE

## 2023-10-24 PROCEDURE — 99999 PR PBB SHADOW E&M-EST. PATIENT-LVL IV: CPT | Mod: PBBFAC,,, | Performed by: INTERNAL MEDICINE

## 2023-10-24 PROCEDURE — 3044F HG A1C LEVEL LT 7.0%: CPT | Mod: CPTII,S$GLB,, | Performed by: INTERNAL MEDICINE

## 2023-10-24 PROCEDURE — 1101F PR PT FALLS ASSESS DOC 0-1 FALLS W/OUT INJ PAST YR: ICD-10-PCS | Mod: CPTII,S$GLB,, | Performed by: INTERNAL MEDICINE

## 2023-10-24 PROCEDURE — 3077F PR MOST RECENT SYSTOLIC BLOOD PRESSURE >= 140 MM HG: ICD-10-PCS | Mod: CPTII,S$GLB,, | Performed by: INTERNAL MEDICINE

## 2023-10-24 PROCEDURE — 1101F PT FALLS ASSESS-DOCD LE1/YR: CPT | Mod: CPTII,S$GLB,, | Performed by: INTERNAL MEDICINE

## 2023-10-24 PROCEDURE — 3288F PR FALLS RISK ASSESSMENT DOCUMENTED: ICD-10-PCS | Mod: CPTII,S$GLB,, | Performed by: INTERNAL MEDICINE

## 2023-10-24 PROCEDURE — 99214 OFFICE O/P EST MOD 30 MIN: CPT | Mod: S$GLB,,, | Performed by: INTERNAL MEDICINE

## 2023-10-24 PROCEDURE — 3008F PR BODY MASS INDEX (BMI) DOCUMENTED: ICD-10-PCS | Mod: CPTII,S$GLB,, | Performed by: INTERNAL MEDICINE

## 2023-10-24 PROCEDURE — 1159F PR MEDICATION LIST DOCUMENTED IN MEDICAL RECORD: ICD-10-PCS | Mod: CPTII,S$GLB,, | Performed by: INTERNAL MEDICINE

## 2023-10-24 NOTE — PROGRESS NOTES
RHEUMATOLOGY OUTPATIENT CLINIC NOTE    10/24/2023    Attending Rheumatologist: Ata Hooks  Primary Care Provider: Sylvain Haywood MD   Physician Requesting Consultation: No referring provider defined for this encounter.  Chief Complaint/Reason For Consultation:  Rheumatoid Arthritis        Subjective:       HPI  Lula De Leon is a 67 y.o. White female with Hx of Seropositive RA who comes to [facility] care.   Patient diagnosed two years ago. Reports it started in the wrist as swelling and pain, could barely move it. Then was seen by a Rheumatologist which placed her on therapy eventually escalating to Triple Therapy (SSZ, MTX, HCQ) for control. Was on MTX 8 tabs, SSZ 4 and decreased to MTX 4 and SSZ 2 in August due to Renal Function.   Patient reports good response on current therapy. Denies joint swelling, tenderness. Reports about 10-15 of morning stiffness. Reports also lower back and knee pain. Most typically with use. Was prior on NSAIDs but stopped, now on Tylenol with good relief.   FHx: Possible Mom, and Aunt with RA     Today  Patient here for follow up.   Last visit RA management continued. Continued to taper off meds. Now off SSZ, HCQ, and 4 tabs of MTX with no recurrence. Doing well, tolerating meds.    Review of Systems   Constitutional:  Negative for appetite change, chills, fatigue, fever and unexpected weight change.   HENT:  Negative for nasal congestion, ear discharge, ear pain, hearing loss, mouth sores, nosebleeds, sneezing, sore throat, tinnitus and trouble swallowing.    Eyes:  Negative for photophobia, pain, discharge, redness, itching and visual disturbance.   Respiratory:  Negative for cough, chest tightness, shortness of breath and wheezing.    Cardiovascular:  Negative for chest pain, palpitations and leg swelling.   Gastrointestinal:  Negative for abdominal distention, abdominal pain, blood in stool, constipation, diarrhea, nausea and vomiting.   Endocrine: Negative for  cold intolerance, heat intolerance, polydipsia, polyphagia and polyuria.   Genitourinary:  Negative for difficulty urinating, dyspareunia, dysuria, flank pain, frequency, genital sores, hematuria, menstrual problem, pelvic pain, urgency, vaginal bleeding, vaginal discharge, vaginal pain and vaginal dryness.   Musculoskeletal:  Negative for arthralgias, back pain, gait problem, joint swelling, leg pain, myalgias, neck pain, neck stiffness and joint deformity.   Integumentary:  Negative for pallor and rash.   Neurological:  Negative for dizziness, seizures, weakness, light-headedness, numbness and headaches.   Hematological:  Negative for adenopathy. Does not bruise/bleed easily.   Psychiatric/Behavioral:  Negative for confusion, decreased concentration and sleep disturbance. The patient is not nervous/anxious.    All other systems reviewed and are negative.         Chronic comorbid conditions affecting medical decision making today:  Past Medical History:   Diagnosis Date    Basal cell carcinoma of right ear 10/18/2022    Rheumatoid arthritis      Past Surgical History:   Procedure Laterality Date    BREAST LUMPECTOMY Right     exc biopsy    BREAST SURGERY      lobectomy 2017 - [facility] BR    CARPAL TUNNEL RELEASE Right     SINUS SURGERY  2017    Dr. Christel FLORES and Dr. Chilel     Family History   Problem Relation Age of Onset    Arthritis Mother     Aneurysm Mother 64    Heart attack Father 70    Epilepsy Sister      Social History     Substance and Sexual Activity   Alcohol Use Yes     Social History     Tobacco Use   Smoking Status Former    Current packs/day: 0.00    Average packs/day: 1 pack/day for 6.0 years (6.0 ttl pk-yrs)    Types: Cigarettes    Start date:     Quit date:     Years since quittin.8   Smokeless Tobacco Never     Social History     Substance and Sexual Activity   Drug Use Not on file       Current Outpatient Medications:     ascorbic acid, vitamin C, (VITAMIN C) 500 MG  tablet, Take 500 mg by mouth., Disp: , Rfl:     atorvastatin (LIPITOR) 20 MG tablet, Take 1 tablet (20 mg total) by mouth once daily., Disp: 90 tablet, Rfl: 3    beta carotene 17805 UNIT capsule, Take 25,000 Units by mouth., Disp: , Rfl:     busPIRone (BUSPAR) 10 MG tablet, Take 1 tablet (10 mg total) by mouth 2 (two) times daily., Disp: 60 tablet, Rfl: 11    calcium carbonate (OS-ONEIDA) 600 mg calcium (1,500 mg) Tab, Take 600 mg by mouth., Disp: , Rfl:     cetirizine (ZYRTEC) 10 MG tablet, Take 10 mg by mouth., Disp: , Rfl:     cyanocobalamin (VITAMIN B-12) 1000 MCG tablet, Take 1,000 mcg by mouth., Disp: , Rfl:     diclofenac sodium (VOLTAREN) 1 % Gel, Apply 2 g topically 4 (four) times daily., Disp: 1 Tube, Rfl: 3    richa prim-linoleic-gamolenic ac (PRIMROSE OIL) 1,000 mg Cap, Take by mouth., Disp: , Rfl:     flaxseed oil Oil, 800 mg by Other route., Disp: , Rfl:     folic acid (FOLVITE) 1 MG tablet, Take 1 tablet (1 mg total) by mouth once daily., Disp: 90 tablet, Rfl: 1    iron bisgly,ps-FA-B-C#12-succ 65 mg-65 mg -1,000 mcg (24) Tab, Take by mouth., Disp: , Rfl:     losartan (COZAAR) 25 MG tablet, Take 2 tablets (50 mg total) by mouth once daily., Disp: 180 tablet, Rfl: 1    MAGNESIUM CARBONATE ORAL, Take 600 mg by mouth., Disp: , Rfl:     methotrexate 2.5 MG Tab, Take 6 tablets (15 mg total) by mouth every 7 days., Disp: 84 tablet, Rfl: 1    omega-3 fatty acids/fish oil (FISH OIL-OMEGA-3 FATTY ACIDS) 300-1,000 mg capsule, Take 2 g by mouth., Disp: , Rfl:     ondansetron (ZOFRAN-ODT) 4 MG TbDL, Take 1 tablet (4 mg total) by mouth every 8 (eight) hours as needed (nausea)., Disp: 15 tablet, Rfl: 0    pantoprazole (PROTONIX) 40 MG tablet, Take 1 tablet (40 mg total) by mouth once daily., Disp: 90 tablet, Rfl: 3    phentermine (ADIPEX-P) 37.5 mg tablet, Take 1 tablet (37.5 mg total) by mouth before breakfast., Disp: 30 tablet, Rfl: 2    traZODone (DESYREL) 50 MG tablet, Take 1 tablet (50 mg total) by mouth nightly as  needed for Insomnia., Disp: 30 tablet, Rfl: 5    upadacitinib (RINVOQ) 15 mg 24 hr tablet, Take 1 tablet (15 mg total) by mouth once daily., Disp: 30 tablet, Rfl: 11    vitamin E 400 UNIT capsule, Take 400 Units by mouth., Disp: , Rfl:     zinc gluconate 50 mg tablet, Take 50 mg by mouth., Disp: , Rfl:     hydrOXYchloroQUINE (PLAQUENIL) 200 mg tablet, Take 1 tablet (200 mg total) by mouth once daily. (Patient not taking: Reported on 10/24/2023), Disp: 90 tablet, Rfl: 1     Objective:         Vitals:    10/24/23 1155   BP: (!) 161/86   Pulse: 78     Physical Exam   Constitutional: She is oriented to person, place, and time.   HENT:   Head: Normocephalic and atraumatic.   Right Ear: External ear normal.   Left Ear: External ear normal.   Nose: Nose normal.   Mouth/Throat: Oropharynx is clear and moist.   Eyes: Pupils are equal, round, and reactive to light. Conjunctivae are normal.   Cardiovascular: Normal rate and regular rhythm.   Pulmonary/Chest: Effort normal and breath sounds normal.   Abdominal: Soft. Bowel sounds are normal.   Musculoskeletal:      Right shoulder: Normal.      Left shoulder: Normal.      Right elbow: Normal.      Left elbow: Normal.      Right wrist: Normal.      Left wrist: Normal.      Cervical back: Normal range of motion and neck supple.      Right knee: Normal.      Left knee: Normal.      Comments: Can make fist, no synovitis  Knee crepitus     Neurological: She is alert and oriented to person, place, and time.   Skin: No rash noted. No erythema.   Psychiatric: Mood and affect normal.       Right Side Rheumatological Exam     Examination finds the shoulder, elbow, wrist, knee, 1st PIP, 1st MCP, 2nd PIP, 2nd MCP, 3rd PIP, 3rd MCP, 4th PIP, 4th MCP, 5th PIP and 5th MCP normal.    Knee Exam   Patellofemoral Crepitus: positive    Left Side Rheumatological Exam     Examination finds the shoulder, elbow, wrist, knee, 1st PIP, 1st MCP, 2nd PIP, 2nd MCP, 3rd PIP, 3rd MCP, 4th PIP, 4th MCP, 5th  "PIP and 5th MCP normal.    Knee Exam     Patellofemoral Crepitus: positive          Reviewed old and all outside pertinent medical records available.    All lab results personally reviewed and interpreted by me.  Lab Results   Component Value Date    WBC 4.64 05/22/2023    HGB 12.1 05/22/2023    HCT 36.4 (L) 05/22/2023    MCV 98 05/22/2023    MCH 32.4 (H) 05/22/2023    MCHC 33.2 05/22/2023    RDW 13.6 05/22/2023     05/22/2023    MPV 10.4 05/22/2023       Lab Results   Component Value Date     06/29/2023    K 4.5 06/29/2023     06/29/2023    CO2 27 06/29/2023     06/29/2023    BUN 19 06/29/2023    CALCIUM 9.5 06/29/2023    PROT 7.3 05/22/2023    ALBUMIN 4.2 05/22/2023    BILITOT 0.6 05/22/2023    AST 38 05/22/2023    ALKPHOS 41 (L) 05/22/2023    ALT 49 (H) 05/22/2023       No results found for: "COLORU", "APPEARANCEUA", "SPECGRAV", "PHUR", "PHUA", "PROTEINUA", "GLUCOSEU", "KETONESU", "BLOODU", "LEUKOCYTESUR", "NITRITE", "UROBILINOGEN"    Lab Results   Component Value Date    CRP 3.0 11/03/2020       Lab Results   Component Value Date    SEDRATE 20 11/03/2020       Lab Results   Component Value Date    .0 (H) 11/03/2020    SEDRATE 20 11/03/2020       No components found for: "25OHVITDTOT", "86QVDSEK8", "75WRZYKQ9", "METHODNOTE"    No results found for: "URICACID"    No components found for: "TSPOTTB"    Imaging:  All imaging reviewed and independently interpreted by me.       ASSESSMENT / PLAN:     Lula De Leon is a 67 y.o. White female with:      1. Rheumatoid arthritis involving both hands, unspecified whether rheumatoid factor present  - Patient with known diagnosis of seropositive RA  - in remission  - tapered off SSZ & HCQ  - now on MTX 4 tabs, advised to taper off over the next two months  - continue Rinvoq  - update labs   - reassurance     2. Low Back and Knee Pain likely OA   -continue Tylenol PRN  -Reassurance and Exercise     3. DMARD Toxicity Monitoring   -monitor " labs  -Daily FA  -no live vaccines  -vaccines per guidelines   -immunosuppression/infectious precautions reinforced     4. Other specified counseling  - over 10 minutes spent regarding below topics:  - Immunization counseling done.  - Nutrition and exercise counseling.  - Limitation of alcohol consumption.  - Regular exercise:  Aerobic and resistance.  - Medication counseling provided.      Follow up in about 4 months (around 2/24/2024).    Method of contact with patient concerns: [facility]    Disclaimer:  This note is prepared using voice recognition software and as such is likely to have errors and has not been proof read. Please contact me for questions.     Time spent: 30 minutes in face to face discussion concerning diagnosis, prognosis, review of lab and test results, benefits of treatment as well as management of disease, counseling of patient and coordination of care between various health care providers.  Greater than half the time spent was used for coordination of care and counseling of patient.    Ata Hooks M.D.  Rheumatology Department   [facility]

## 2023-12-03 DIAGNOSIS — I10 ESSENTIAL HYPERTENSION: ICD-10-CM

## 2023-12-04 DIAGNOSIS — Z79.899 IMMUNODEFICIENCY DUE TO TREATMENT WITH IMMUNOSUPPRESSIVE MEDICATION: ICD-10-CM

## 2023-12-04 DIAGNOSIS — D84.821 IMMUNODEFICIENCY DUE TO TREATMENT WITH IMMUNOSUPPRESSIVE MEDICATION: ICD-10-CM

## 2023-12-04 DIAGNOSIS — M06.9 RHEUMATOID ARTHRITIS INVOLVING BOTH HANDS, UNSPECIFIED WHETHER RHEUMATOID FACTOR PRESENT: ICD-10-CM

## 2023-12-04 RX ORDER — LOSARTAN POTASSIUM 25 MG/1
50 TABLET ORAL DAILY
Qty: 180 TABLET | Refills: 3 | Status: SHIPPED | OUTPATIENT
Start: 2023-12-04 | End: 2024-11-28

## 2023-12-04 NOTE — TELEPHONE ENCOUNTER
No care due was identified.  [facility] Embedded Care Due Messages. Reference number: [id number].   12/04/2023 8:20:09 AM CST

## 2023-12-04 NOTE — TELEPHONE ENCOUNTER
Refill Routing Note   Medication(s) are not appropriate for processing by [facility] for the following reason(s):        Required vitals abnormal    [facility] action(s):  Defer               Appointments  past 12m or future 3m with PCP    Date Provider   Last Visit   9/26/2023 Sylvain Haywood MD   Next Visit   12/12/2023 Sylvain Haywood MD   ED visits in past 90 days: 0        Note composed:10:20 AM 12/04/2023

## 2023-12-05 RX ORDER — FOLIC ACID 1 MG/1
1 TABLET ORAL DAILY
Qty: 90 TABLET | Refills: 1 | Status: SHIPPED | OUTPATIENT
Start: 2023-12-05

## 2023-12-11 DIAGNOSIS — Z79.899 IMMUNODEFICIENCY DUE TO TREATMENT WITH IMMUNOSUPPRESSIVE MEDICATION: ICD-10-CM

## 2023-12-11 DIAGNOSIS — M06.9 RHEUMATOID ARTHRITIS INVOLVING BOTH HANDS, UNSPECIFIED WHETHER RHEUMATOID FACTOR PRESENT: ICD-10-CM

## 2023-12-11 DIAGNOSIS — D84.821 IMMUNODEFICIENCY DUE TO TREATMENT WITH IMMUNOSUPPRESSIVE MEDICATION: ICD-10-CM

## 2023-12-12 RX ORDER — HYDROXYCHLOROQUINE SULFATE 200 MG/1
200 TABLET, FILM COATED ORAL DAILY
Qty: 90 TABLET | Refills: 1 | Status: SHIPPED | OUTPATIENT
Start: 2023-12-12 | End: 2024-02-21

## 2024-02-06 DIAGNOSIS — Z12.11 COLON CANCER SCREENING: ICD-10-CM

## 2024-02-21 ENCOUNTER — OFFICE VISIT (OUTPATIENT)
Dept: RHEUMATOLOGY | Facility: CLINIC | Age: 69
End: 2024-02-21
Payer: MEDICARE

## 2024-02-21 ENCOUNTER — PATIENT OUTREACH (OUTPATIENT)
Dept: ADMINISTRATIVE | Facility: HOSPITAL | Age: 69
End: 2024-02-21
Payer: MEDICARE

## 2024-02-21 DIAGNOSIS — Z79.899 IMMUNODEFICIENCY DUE TO TREATMENT WITH IMMUNOSUPPRESSIVE MEDICATION: ICD-10-CM

## 2024-02-21 DIAGNOSIS — D84.821 IMMUNODEFICIENCY DUE TO TREATMENT WITH IMMUNOSUPPRESSIVE MEDICATION: ICD-10-CM

## 2024-02-21 DIAGNOSIS — M15.9 PRIMARY OSTEOARTHRITIS INVOLVING MULTIPLE JOINTS: Primary | ICD-10-CM

## 2024-02-21 DIAGNOSIS — M06.9 RHEUMATOID ARTHRITIS INVOLVING BOTH HANDS, UNSPECIFIED WHETHER RHEUMATOID FACTOR PRESENT: ICD-10-CM

## 2024-02-21 PROCEDURE — 1101F PT FALLS ASSESS-DOCD LE1/YR: CPT | Mod: CPTII,S$GLB,, | Performed by: STUDENT IN AN ORGANIZED HEALTH CARE EDUCATION/TRAINING PROGRAM

## 2024-02-21 PROCEDURE — 1159F MED LIST DOCD IN RCRD: CPT | Mod: CPTII,S$GLB,, | Performed by: STUDENT IN AN ORGANIZED HEALTH CARE EDUCATION/TRAINING PROGRAM

## 2024-02-21 PROCEDURE — 3288F FALL RISK ASSESSMENT DOCD: CPT | Mod: CPTII,S$GLB,, | Performed by: STUDENT IN AN ORGANIZED HEALTH CARE EDUCATION/TRAINING PROGRAM

## 2024-02-21 PROCEDURE — 99999 PR PBB SHADOW E&M-EST. PATIENT-LVL III: CPT | Mod: PBBFAC,,, | Performed by: STUDENT IN AN ORGANIZED HEALTH CARE EDUCATION/TRAINING PROGRAM

## 2024-02-21 PROCEDURE — 99214 OFFICE O/P EST MOD 30 MIN: CPT | Mod: S$GLB,,, | Performed by: STUDENT IN AN ORGANIZED HEALTH CARE EDUCATION/TRAINING PROGRAM

## 2024-02-21 RX ORDER — METHOTREXATE 2.5 MG/1
5 TABLET ORAL
Qty: 24 TABLET | Refills: 0 | Status: SHIPPED | OUTPATIENT
Start: 2024-02-21

## 2024-02-21 NOTE — PROGRESS NOTES
RHEUMATOLOGY OUTPATIENT CLINIC NOTE    2/21/2024    Attending Rheumatologist: Kimmy Greer  Primary Care Provider: Sylvain Haywood MD   Physician Requesting Consultation: No referring provider defined for this encounter.  Chief Complaint/Reason For Consultation:  Rheumatoid arthritis, involving unspecified site, unspecifi        Subjective:       HPI  Lula De Leon is a 68 y.o. White female with Hx of Seropositive RA who comes to hospitals care.     She is new to me. Used to follow up with Dr. Hooks     Patient diagnosed in 2017 . Reports it started in the wrist as swelling and pain, could barely move it. Then was seen by a Rheumatologist which placed her on therapy eventually escalating to Triple Therapy (SSZ, MTX, HCQ) for control. Was on MTX 8 tabs, SSZ 4 and decreased to MTX 4 and SSZ 2 in August due to Renal Function.   Patient reports good response on current therapy. Denies joint swelling, tenderness. Reports about 10-15 of morning stiffness. Reports also lower back and knee pain. Most typically with use. Was prior on NSAIDs but stopped, now on Tylenol with good relief.   FHx: Possible Mom, and Aunt with RA     Today  -Last visit on 10/2023 with Dr. Hooks   -She is doing well now. She tried to tapered off methotrexate after last visit but noted worsening joint pain so she resumed methotrexate at 5 mg PO QW (2 tabs) and daily folic acid. She continues to take Rinvoq daily. Denies any side effects. No recurrent infections.   -She is off HCQ, SSZ.   -Labs from 10/2023 showed normal CBC and CMP      Review of Systems   Constitutional:  Negative for appetite change, chills, fatigue, fever and unexpected weight change.   HENT:  Negative for nasal congestion, ear discharge, ear pain, hearing loss, mouth sores, nosebleeds, sneezing, sore throat, tinnitus and trouble swallowing.    Eyes:  Negative for photophobia, pain, discharge, redness, itching and visual disturbance.   Respiratory:  Negative for  cough, chest tightness, shortness of breath and wheezing.    Cardiovascular:  Negative for chest pain, palpitations and leg swelling.   Gastrointestinal:  Negative for abdominal distention, abdominal pain, blood in stool, constipation, diarrhea, nausea and vomiting.   Endocrine: Negative for cold intolerance, heat intolerance, polydipsia, polyphagia and polyuria.   Genitourinary:  Negative for difficulty urinating, dyspareunia, dysuria, flank pain, frequency, genital sores, hematuria, menstrual problem, pelvic pain, urgency, vaginal bleeding, vaginal discharge, vaginal pain and vaginal dryness.   Musculoskeletal:  Negative for arthralgias, back pain, gait problem, joint swelling, leg pain, myalgias, neck pain, neck stiffness and joint deformity.   Integumentary:  Negative for pallor and rash.   Neurological:  Negative for dizziness, seizures, weakness, light-headedness, numbness and headaches.   Hematological:  Negative for adenopathy. Does not bruise/bleed easily.   Psychiatric/Behavioral:  Negative for confusion, decreased concentration and sleep disturbance. The patient is not nervous/anxious.    All other systems reviewed and are negative.         Chronic comorbid conditions affecting medical decision making today:  Past Medical History:   Diagnosis Date    Basal cell carcinoma of right ear 10/18/2022    Rheumatoid arthritis      Past Surgical History:   Procedure Laterality Date    BREAST LUMPECTOMY Right     exc biopsy    BREAST SURGERY      lobectomy 2017 - [facility]    CARPAL TUNNEL RELEASE Right     SINUS SURGERY  2017    Dr. Christel FLORES and Dr. Cihlel     Family History   Problem Relation Age of Onset    Arthritis Mother     Aneurysm Mother 64    Heart attack Father 70    Epilepsy Sister      Social History     Substance and Sexual Activity   Alcohol Use Yes    Alcohol/week: 2.0 standard drinks of alcohol    Types: 2 Glasses of wine per week    Comment: 3/4 x a week     Social History      Tobacco Use   Smoking Status Former    Current packs/day: 0.00    Average packs/day: 1 pack/day for 6.0 years (6.0 ttl pk-yrs)    Types: Cigarettes    Start date:     Quit date:     Years since quittin.1   Smokeless Tobacco Never     Social History     Substance and Sexual Activity   Drug Use Not on file       Current Outpatient Medications:     ascorbic acid, vitamin C, (VITAMIN C) 500 MG tablet, Take 500 mg by mouth., Disp: , Rfl:     atorvastatin (LIPITOR) 20 MG tablet, Take 1 tablet (20 mg total) by mouth once daily., Disp: 90 tablet, Rfl: 3    beta carotene 15262 UNIT capsule, Take 25,000 Units by mouth., Disp: , Rfl:     busPIRone (BUSPAR) 10 MG tablet, Take 1 tablet (10 mg total) by mouth 2 (two) times daily., Disp: 60 tablet, Rfl: 11    calcium carbonate (OS-ONEIDA) 600 mg calcium (1,500 mg) Tab, Take 600 mg by mouth., Disp: , Rfl:     cetirizine (ZYRTEC) 10 MG tablet, Take 10 mg by mouth., Disp: , Rfl:     cyanocobalamin (VITAMIN B-12) 1000 MCG tablet, Take 1,000 mcg by mouth., Disp: , Rfl:     diclofenac sodium (VOLTAREN) 1 % Gel, Apply 2 g topically 4 (four) times daily., Disp: 1 Tube, Rfl: 3    richa prim-linoleic-gamolenic ac (PRIMROSE OIL) 1,000 mg Cap, Take by mouth., Disp: , Rfl:     flaxseed oil Oil, 800 mg by Other route., Disp: , Rfl:     folic acid (FOLVITE) 1 MG tablet, Take 1 tablet (1 mg total) by mouth once daily., Disp: 90 tablet, Rfl: 1    iron bisgly,ps-FA-B-C#12-succ 65 mg-65 mg -1,000 mcg (24) Tab, Take by mouth., Disp: , Rfl:     losartan (COZAAR) 25 MG tablet, Take 2 tablets (50 mg total) by mouth once daily., Disp: 180 tablet, Rfl: 3    MAGNESIUM CARBONATE ORAL, Take 600 mg by mouth., Disp: , Rfl:     omega-3 fatty acids/fish oil (FISH OIL-OMEGA-3 FATTY ACIDS) 300-1,000 mg capsule, Take 2 g by mouth., Disp: , Rfl:     ondansetron (ZOFRAN-ODT) 4 MG TbDL, Take 1 tablet (4 mg total) by mouth every 8 (eight) hours as needed (nausea)., Disp: 15 tablet, Rfl: 0    pantoprazole  (PROTONIX) 40 MG tablet, Take 1 tablet (40 mg total) by mouth once daily., Disp: 90 tablet, Rfl: 3    traZODone (DESYREL) 50 MG tablet, Take 1 tablet (50 mg total) by mouth nightly as needed for Insomnia., Disp: 30 tablet, Rfl: 5    upadacitinib (RINVOQ) 15 mg 24 hr tablet, Take 1 tablet (15 mg total) by mouth once daily., Disp: 30 tablet, Rfl: 11    vitamin E 400 UNIT capsule, Take 400 Units by mouth., Disp: , Rfl:     zinc gluconate 50 mg tablet, Take 50 mg by mouth., Disp: , Rfl:     methotrexate 2.5 MG Tab, Take 2 tablets (5 mg total) by mouth every 7 days., Disp: 24 tablet, Rfl: 0     Objective:         There were no vitals filed for this visit.    Physical Exam   Constitutional: She is oriented to person, place, and time.   HENT:   Head: Normocephalic.   Mouth/Throat: Oropharynx is clear and moist. Mucous membranes are moist.   Pulmonary/Chest: Effort normal.   Abdominal: Soft.   Musculoskeletal:      Right shoulder: Normal.      Left shoulder: Normal.      Right elbow: Normal.      Left elbow: Normal.      Right wrist: Normal.      Left wrist: Normal.      Cervical back: Normal range of motion and neck supple.      Right knee: Normal.      Left knee: Normal.      Comments: Can make fist, no synovitis  Knee crepitus     Neurological: She is alert and oriented to person, place, and time.   Skin: No rash noted. No erythema.   Psychiatric: Mood and affect normal.       Right Side Rheumatological Exam     Examination finds the shoulder, elbow, wrist, knee, 1st PIP, 1st MCP, 2nd PIP, 2nd MCP, 3rd PIP, 3rd MCP, 4th PIP, 4th MCP, 5th PIP and 5th MCP normal.    Knee Exam   Patellofemoral Crepitus: positive    Left Side Rheumatological Exam     Examination finds the shoulder, elbow, wrist, knee, 1st PIP, 1st MCP, 2nd PIP, 2nd MCP, 3rd PIP, 3rd MCP, 4th PIP, 4th MCP, 5th PIP and 5th MCP normal.    Knee Exam     Patellofemoral Crepitus: positive          Reviewed old and all outside pertinent medical records  "available.    All lab results personally reviewed and interpreted by me.  Lab Results   Component Value Date    WBC 5.24 10/24/2023    HGB 11.8 (L) 10/24/2023    HCT 37.6 10/24/2023     (H) 10/24/2023    MCH 33.1 (H) 10/24/2023    MCHC 31.4 (L) 10/24/2023    RDW 13.3 10/24/2023     10/24/2023    MPV 9.6 10/24/2023       Lab Results   Component Value Date     10/24/2023    K 4.2 10/24/2023     10/24/2023    CO2 24 10/24/2023    GLU 91 10/24/2023    BUN 18 10/24/2023    CALCIUM 9.5 10/24/2023    PROT 7.2 10/24/2023    ALBUMIN 4.2 10/24/2023    BILITOT 0.3 10/24/2023    AST 38 10/24/2023    ALKPHOS 41 (L) 10/24/2023    ALT 42 10/24/2023       No results found for: "COLORU", "APPEARANCEUA", "SPECGRAV", "PHUR", "PHUA", "PROTEINUA", "GLUCOSEU", "KETONESU", "BLOODU", "LEUKOCYTESUR", "NITRITE", "UROBILINOGEN"    Lab Results   Component Value Date    CRP 3.0 11/03/2020       Lab Results   Component Value Date    SEDRATE 20 11/03/2020       Lab Results   Component Value Date    .0 (H) 11/03/2020    SEDRATE 20 11/03/2020       No components found for: "25OHVITDTOT", "53YWOKOF6", "39SKIHSY0", "METHODNOTE"    No results found for: "URICACID"    No components found for: "TSPOTTB"    Imaging:  All imaging reviewed and independently interpreted by me.       ASSESSMENT / PLAN:     Lula De Leon is a 68 y.o. White female with:    1. Rheumatoid arthritis involving both hands, unspecified whether rheumatoid factor present  - Patient with known diagnosis of seropositive RA  - Prev meds: SSZ and HCQ, both tapered off due to control on rinvoq.   - attempted taper methotrexate in 10/2023 but joint pain recurred. Currenly on MTX 2 tabs weekly and symptoms are controlled.   - continue rinvoq daily. Samples provided to patient  (# 10 - Exp date 9/22/2025, LOT [id number])  - labs every 4-6 months   - reassurance  - consider TNF, Xeljanz, IL-6 if worsening symptoms      2. Low Back and Knee Pain likely OA "   -continue Tylenol PRN  -Reassurance and Exercise     3. DMARD Toxicity Monitoring   -monitor labs  -Daily FA  -no live vaccines  -vaccines per guidelines. Strongly suggested patient to get shingles vaccine.   -immunosuppression/infectious precautions reinforced     4. Other specified counseling  - over 10 minutes spent regarding below topics:  - Immunization counseling done.  - Nutrition and exercise counseling.  - Limitation of alcohol consumption.  - Regular exercise:  Aerobic and resistance.  - Medication counseling provided.      Follow up in about 3 months (around 5/21/2024).    Method of contact with patient concerns: [facility]    Disclaimer:  This note is prepared using voice recognition software and as such is likely to have errors and has not been proof read. Please contact me for questions.     Time spent: 30 minutes in face to face discussion concerning diagnosis, prognosis, review of lab and test results, benefits of treatment as well as management of disease, counseling of patient and coordination of care between various health care providers.  Greater than half the time spent was used for coordination of care and counseling of patient.    Kimmy Greer M.D.  Rheumatology Department   [facility]

## 2024-02-21 NOTE — PROGRESS NOTES
Population Health Chart Review & Patient Outreach Details    Outreach Performed: NO    Additional Pop Health Notes:    Per REGGIE in basket message, pt declined flu vaccine 02/21/2024.       Updates Requested / Reviewed:      Updated Care Coordination Note, Care Everywhere, and Immunizations Reconciliation Completed or Queried: [place]         Health Maintenance Topics Overdue:    Health Maintenance Due   Topic Date Due    COVID-19 Vaccine (1) Never done    Pneumococcal Vaccines (Age 65+) (1 of 2 - PCV) Never done    TETANUS VACCINE  Never done    Shingles Vaccine (1 of 2) Never done    DEXA Scan  Never done    Colorectal Cancer Screening  Never done    RSV Vaccine (Age 60+ and Pregnant patients) (1 - 1-dose 60+ series) Never done         Health Maintenance Topic(s) Outreach Outcomes & Actions Taken:

## 2024-02-25 DIAGNOSIS — M25.561 RIGHT KNEE PAIN, UNSPECIFIED CHRONICITY: Primary | ICD-10-CM

## 2024-02-26 ENCOUNTER — OFFICE VISIT (OUTPATIENT)
Dept: ORTHOPEDICS | Facility: CLINIC | Age: 69
End: 2024-02-26
Attending: ORTHOPAEDIC SURGERY
Payer: MEDICARE

## 2024-02-26 VITALS — HEIGHT: 63 IN | BODY MASS INDEX: 27.82 KG/M2 | WEIGHT: 157 LBS

## 2024-02-26 DIAGNOSIS — M17.11 PRIMARY OSTEOARTHRITIS OF RIGHT KNEE: ICD-10-CM

## 2024-02-26 DIAGNOSIS — M25.561 RIGHT KNEE PAIN, UNSPECIFIED CHRONICITY: Primary | ICD-10-CM

## 2024-02-26 PROCEDURE — 99999 PR PBB SHADOW E&M-EST. PATIENT-LVL III: CPT | Mod: PBBFAC,,, | Performed by: ORTHOPAEDIC SURGERY

## 2024-02-26 PROCEDURE — 99203 OFFICE O/P NEW LOW 30 MIN: CPT | Mod: S$GLB,,, | Performed by: ORTHOPAEDIC SURGERY

## 2024-02-26 PROCEDURE — 1125F AMNT PAIN NOTED PAIN PRSNT: CPT | Mod: CPTII,S$GLB,, | Performed by: ORTHOPAEDIC SURGERY

## 2024-02-26 PROCEDURE — 1159F MED LIST DOCD IN RCRD: CPT | Mod: CPTII,S$GLB,, | Performed by: ORTHOPAEDIC SURGERY

## 2024-02-26 PROCEDURE — 3008F BODY MASS INDEX DOCD: CPT | Mod: CPTII,S$GLB,, | Performed by: ORTHOPAEDIC SURGERY

## 2024-02-26 RX ORDER — MELOXICAM 15 MG/1
15 TABLET ORAL DAILY
Qty: 30 TABLET | Refills: 1 | Status: SHIPPED | OUTPATIENT
Start: 2024-02-26

## 2024-02-26 NOTE — PROGRESS NOTES
NEW PATIENT ORTHOPAEDIC: Knee    PRIMARY CARE PHYSICIAN: Sylvain Haywood MD   REFERRING PROVIDER: Eamon Wong MD  [address] [place] [address]  [place] [id number]     ASSESSMENT & PLAN:    Impression:  Right Knee Moderate osteoarthritis, primary    Follow Up Plan: PRN     Non operative care:    Lula De Leon has physical exam evidence of above and wishes to pursue an non-operative care. I am recommending the following: Mobic    Patient comes in with a one-week history of having atraumatic onset but acute right knee pain.  She associates this with misstep aching and having a pop in her knee that caused her to drop to the ground.  This pain was primarily medial and posterior based.  She is doing better today he is able to ambulate, able to bend her knee.  Has not had recurrent mechanical symptoms in her knee.  She has been treating with ice and a muscle relaxer but no other medications or interventions.  She has radiographs today that demonstrate some moderate medial compartment arthritis and I think she has a associated degenerative meniscal pathology.  She does have medial joint line pain and a positive Ismael's test.  Overall she is walking without use of assistive devices.  I do not think an MRI at this time would be helpful.  I am going to put her on anti-inflammatory medications for the next few weeks.  If she fails that treatment could revisit the idea of intra-articular steroid injection or physical therapy.    The patient has been ordered:  Pain Prescription    CONSULTS:   None    ACTIVE PROBLEM LIST  Patient Active Problem List   Diagnosis    Elevated blood pressure reading    Rheumatoid arthritis involving both hands    Encounter for screening for malignant neoplasm of breast    Gastroesophageal reflux disease    Hyperlipidemia    Aortic atherosclerosis           SUBJECTIVE    CHIEF COMPLAINT: Knee Pain    HPI:   Lula De Leon is a 68 y.o. female here for evaluation and management of right knee pain. There is  a specific incident that brought about this pain. she has had progressive problems with the knee(s) starting 1 weeks ago but is now progressing to interfere with activities which include: walking, functional household ADL's, rising from a sitting position, and climbing stairs     Currently the pain in the joint is rated at moderate with activity. The pain is constant and is located in the knee, at level of joint line and located medially. The pain is described as aching, sharp, and throbbing. Relieving factors include rest and ice or heat.     There is associated Clicking and Popping.     Lula De Leon has no additional complaints.     PROGRESSIVE SYMPTOMS:  Pain worsened by weight bearing  Pain effecting living situation    FUNCTIONAL STATUS:   Climb a flight of stairs or walk up a hill     PREVIOUS TREATMENTS:  Medical: None  Physical Therapy: Activities Modified   Previous Orthopaedic Surgery: None    REVIEW OF SYSTEMS:  PAIN ASSESSMENT:  See HPI.  MUSCULOSKELETAL: See HPI.  OTHER 10 point review of systems is negative except as stated in HPI above    PAST MEDICAL HISTORY   has a past medical history of Basal cell carcinoma of right ear (10/18/2022) and Rheumatoid arthritis.     PAST SURGICAL HISTORY   has a past surgical history that includes Carpal tunnel release (Right); Sinus surgery (2017); Breast surgery; and Breast lumpectomy (Right).     FAMILY HISTORY  family history includes Aneurysm (age of onset: 64) in her mother; Arthritis in her mother; Epilepsy in her sister; Heart attack (age of onset: 70) in her father.     SOCIAL HISTORY   reports that she quit smoking about 36 years ago. Her smoking use included cigarettes. She started smoking about 42 years ago. She has a 6.0 pack-year smoking history. She has never used smokeless tobacco. She reports current alcohol use of about 2.0 standard drinks of alcohol per week.     ALLERGIES   Review of patient's allergies indicates:  No Known Allergies      MEDICATIONS  Current Outpatient Medications on File Prior to Visit   Medication Sig Dispense Refill    ascorbic acid, vitamin C, (VITAMIN C) 500 MG tablet Take 500 mg by mouth.      atorvastatin (LIPITOR) 20 MG tablet Take 1 tablet (20 mg total) by mouth once daily. 90 tablet 3    beta carotene 33724 UNIT capsule Take 25,000 Units by mouth.      busPIRone (BUSPAR) 10 MG tablet Take 1 tablet (10 mg total) by mouth 2 (two) times daily. 60 tablet 11    calcium carbonate (OS-ONEIDA) 600 mg calcium (1,500 mg) Tab Take 600 mg by mouth.      cetirizine (ZYRTEC) 10 MG tablet Take 10 mg by mouth.      cyanocobalamin (VITAMIN B-12) 1000 MCG tablet Take 1,000 mcg by mouth.      diclofenac sodium (VOLTAREN) 1 % Gel Apply 2 g topically 4 (four) times daily. 1 Tube 3    richa prim-linoleic-gamolenic ac (PRIMROSE OIL) 1,000 mg Cap Take by mouth.      flaxseed oil Oil 800 mg by Other route.      folic acid (FOLVITE) 1 MG tablet Take 1 tablet (1 mg total) by mouth once daily. 90 tablet 1    iron bisgly,ps-FA-B-C#12-succ 65 mg-65 mg -1,000 mcg (24) Tab Take by mouth.      losartan (COZAAR) 25 MG tablet Take 2 tablets (50 mg total) by mouth once daily. 180 tablet 3    MAGNESIUM CARBONATE ORAL Take 600 mg by mouth.      methotrexate 2.5 MG Tab Take 2 tablets (5 mg total) by mouth every 7 days. 24 tablet 0    omega-3 fatty acids/fish oil (FISH OIL-OMEGA-3 FATTY ACIDS) 300-1,000 mg capsule Take 2 g by mouth.      ondansetron (ZOFRAN-ODT) 4 MG TbDL Take 1 tablet (4 mg total) by mouth every 8 (eight) hours as needed (nausea). 15 tablet 0    pantoprazole (PROTONIX) 40 MG tablet Take 1 tablet (40 mg total) by mouth once daily. 90 tablet 3    traZODone (DESYREL) 50 MG tablet Take 1 tablet (50 mg total) by mouth nightly as needed for Insomnia. 30 tablet 5    upadacitinib (RINVOQ) 15 mg 24 hr tablet Take 1 tablet (15 mg total) by mouth once daily. 30 tablet 11    vitamin E 400 UNIT capsule Take 400 Units by mouth.      zinc gluconate 50 mg  "tablet Take 50 mg by mouth.       No current facility-administered medications on file prior to visit.          PHYSICAL EXAM   height is 5' 2.5" (1.588 m) and weight is 71.2 kg (157 lb).   Body mass index is 28.26 kg/m².      All other systems deferred.  GENERAL:  No acute distress  HABITUS: Normal  GAIT: Antalgic  SKIN: Normal     KNEE EXAM:    right:   Effusion: Small joint effusion  TTP: Yes over Medial Joint Line   Yes crepitus with passive knee ROM  Passive ROM: Extension 0, Flexion 130  No pain with manipulation of patella  Stable to varus/valgus stress. No increased laxity to anterior/posterior drawer testing  positive Ismael's test  No pain with IR/ER rotation of the hip  5/5 strength in knee flexion and extension, ankle plantarflexion and dorsiflexion  Neurovascular Status: Sensation intact to light touch in Sural, Saphenous, SPN, DPN, Tibial nerve distribution  2+ pulse DP/PT, normal capillary refill, foot has normal warmth    DATA:  Diagnostic tests reviewed for today's visit:     4v of the knee reveal Moderate degenerative changes of the Medial compartment. There is evidence of advanced osteoarthritis changes with Joint space narrowing. The limb is in varus alignment. The patella is tracking midline.  "

## 2024-03-21 ENCOUNTER — OFFICE VISIT (OUTPATIENT)
Dept: ORTHOPEDICS | Facility: CLINIC | Age: 69
End: 2024-03-21
Payer: MEDICARE

## 2024-03-21 VITALS — WEIGHT: 156.94 LBS | BODY MASS INDEX: 27.81 KG/M2 | HEIGHT: 63 IN

## 2024-03-21 DIAGNOSIS — M17.11 PRIMARY OSTEOARTHRITIS OF RIGHT KNEE: ICD-10-CM

## 2024-03-21 DIAGNOSIS — M25.561 RIGHT KNEE PAIN, UNSPECIFIED CHRONICITY: Primary | ICD-10-CM

## 2024-03-21 PROCEDURE — 1159F MED LIST DOCD IN RCRD: CPT | Mod: CPTII,S$GLB,, | Performed by: ORTHOPAEDIC SURGERY

## 2024-03-21 PROCEDURE — 3008F BODY MASS INDEX DOCD: CPT | Mod: CPTII,S$GLB,, | Performed by: ORTHOPAEDIC SURGERY

## 2024-03-21 PROCEDURE — 99213 OFFICE O/P EST LOW 20 MIN: CPT | Mod: 25,S$GLB,, | Performed by: ORTHOPAEDIC SURGERY

## 2024-03-21 PROCEDURE — 20610 DRAIN/INJ JOINT/BURSA W/O US: CPT | Mod: RT,S$GLB,, | Performed by: ORTHOPAEDIC SURGERY

## 2024-03-21 PROCEDURE — 4010F ACE/ARB THERAPY RXD/TAKEN: CPT | Mod: CPTII,S$GLB,, | Performed by: ORTHOPAEDIC SURGERY

## 2024-03-21 PROCEDURE — 99999 PR PBB SHADOW E&M-EST. PATIENT-LVL III: CPT | Mod: PBBFAC,,, | Performed by: ORTHOPAEDIC SURGERY

## 2024-03-21 PROCEDURE — 1125F AMNT PAIN NOTED PAIN PRSNT: CPT | Mod: CPTII,S$GLB,, | Performed by: ORTHOPAEDIC SURGERY

## 2024-03-21 RX ORDER — TRIAMCINOLONE ACETONIDE 40 MG/ML
40 INJECTION, SUSPENSION INTRA-ARTICULAR; INTRAMUSCULAR
Status: DISCONTINUED | OUTPATIENT
Start: 2024-03-21 | End: 2024-03-21 | Stop reason: HOSPADM

## 2024-03-21 RX ADMIN — TRIAMCINOLONE ACETONIDE 40 MG: 40 INJECTION, SUSPENSION INTRA-ARTICULAR; INTRAMUSCULAR at 09:03

## 2024-03-21 NOTE — PROCEDURES
Large Joint Aspiration/Injection: R knee    Date/Time: 3/21/2024 9:20 AM    Performed by: Eamon Wong MD  Authorized by: Eamon Wong MD    Consent Done?:  Yes (Verbal)  Indications:  Arthritis  Prep: patient was prepped and draped in usual sterile fashion      Local anesthesia used?: Yes    Anesthesia:  Local infiltration  Local anesthetic:  Topical anesthetic and lidocaine 1% without epinephrine    Details:  Needle Size:  22 G  Approach:  Anterolateral  Location:  Knee  Site:  R knee  Medications:  40 mg triamcinolone acetonide 40 mg/mL  Patient tolerance:  Patient tolerated the procedure well with no immediate complications

## 2024-03-21 NOTE — PROGRESS NOTES
EST PATIENT ORTHOPAEDIC: Knee    PRIMARY CARE PHYSICIAN: Sylvain Haywood MD   REFERRING PROVIDER: No referring provider defined for this encounter.     ASSESSMENT & PLAN:    Impression:  Right Knee Moderate osteoarthritis, primary    Follow Up Plan: PRN     Non operative care:    Lula De Leon has physical exam evidence of above and wishes to pursue an non-operative care. I am recommending the following: Injection    To review:  Patient comes in with a 6-week history of having atraumatic onset but acute right knee pain.  She associates this with misstep aching and having a pop in her knee that caused her to drop to the ground.  This pain was primarily medial and posterior based.  She was better, able to ambulate, able to bend her knee.  Has not had recurrent mechanical symptoms in her knee.  She has been treating with ice and a muscle relaxer but no other medications or interventions.  She has radiographs today that demonstrate some moderate medial compartment arthritis and I think she has a associated degenerative meniscal pathology.  She does have medial joint line pain and a positive Ismael's test.  Overall she is walking without use of assistive devices.  I do not think an MRI at this time would be helpful at this time.  I put her on anti-inflammatory medications. This has failed to provide lasting relief, as a result I am recommending an intra-articular steroid injection today. If provides relief can continue these going forward, if no relief, would consider MRI.     The patient has been ordered:  Injection    CONSULTS:   None    ACTIVE PROBLEM LIST  Patient Active Problem List   Diagnosis    Elevated blood pressure reading    Rheumatoid arthritis involving both hands    Encounter for screening for malignant neoplasm of breast    Gastroesophageal reflux disease    Hyperlipidemia    Aortic atherosclerosis           SUBJECTIVE    CHIEF COMPLAINT: Knee Pain    HPI:   Lula De Leon is a 68 y.o. female here for  evaluation and management of right knee pain. There is a specific incident that brought about this pain. she has had progressive problems with the knee(s) starting 1 weeks ago but is now progressing to interfere with activities which include: walking, functional household ADL's, rising from a sitting position, and climbing stairs     Currently the pain in the joint is rated at moderate with activity. The pain is constant and is located in the knee, at level of joint line and located medially. The pain is described as aching, sharp, and throbbing. Relieving factors include rest and ice or heat.     There is associated Clicking and Popping.     Lula De Leon has no additional complaints.     3/21/24: Patient here for follow up. Last seen 1 months ago, conservative treatment attempted but with on going pain. No changes in quality or characteristics.    PROGRESSIVE SYMPTOMS:  Pain worsened by weight bearing  Pain effecting living situation    FUNCTIONAL STATUS:   Climb a flight of stairs or walk up a hill     PREVIOUS TREATMENTS:  Medical: None  Physical Therapy: Activities Modified   Previous Orthopaedic Surgery: None    REVIEW OF SYSTEMS:  PAIN ASSESSMENT:  See HPI.  MUSCULOSKELETAL: See HPI.  OTHER 10 point review of systems is negative except as stated in HPI above    PAST MEDICAL HISTORY   has a past medical history of Basal cell carcinoma of right ear (10/18/2022) and Rheumatoid arthritis.     PAST SURGICAL HISTORY   has a past surgical history that includes Carpal tunnel release (Right); Sinus surgery (2017); Breast surgery; and Breast lumpectomy (Right).     FAMILY HISTORY  family history includes Aneurysm (age of onset: 64) in her mother; Arthritis in her mother; Epilepsy in her sister; Heart attack (age of onset: 70) in her father.     SOCIAL HISTORY   reports that she quit smoking about 36 years ago. Her smoking use included cigarettes. She started smoking about 42 years ago. She has a 6.0 pack-year smoking  history. She has never used smokeless tobacco. She reports current alcohol use of about 2.0 standard drinks of alcohol per week.     ALLERGIES   Review of patient's allergies indicates:  No Known Allergies     MEDICATIONS  Current Outpatient Medications on File Prior to Visit   Medication Sig Dispense Refill    ascorbic acid, vitamin C, (VITAMIN C) 500 MG tablet Take 500 mg by mouth.      atorvastatin (LIPITOR) 20 MG tablet Take 1 tablet (20 mg total) by mouth once daily. 90 tablet 3    beta carotene 53586 UNIT capsule Take 25,000 Units by mouth.      busPIRone (BUSPAR) 10 MG tablet Take 1 tablet (10 mg total) by mouth 2 (two) times daily. 60 tablet 11    calcium carbonate (OS-ONEIDA) 600 mg calcium (1,500 mg) Tab Take 600 mg by mouth.      cetirizine (ZYRTEC) 10 MG tablet Take 10 mg by mouth.      cyanocobalamin (VITAMIN B-12) 1000 MCG tablet Take 1,000 mcg by mouth.      diclofenac sodium (VOLTAREN) 1 % Gel Apply 2 g topically 4 (four) times daily. 1 Tube 3    richa prim-linoleic-gamolenic ac (PRIMROSE OIL) 1,000 mg Cap Take by mouth.      flaxseed oil Oil 800 mg by Other route.      folic acid (FOLVITE) 1 MG tablet Take 1 tablet (1 mg total) by mouth once daily. 90 tablet 1    iron bisgly,ps-FA-B-C#12-succ 65 mg-65 mg -1,000 mcg (24) Tab Take by mouth.      losartan (COZAAR) 25 MG tablet Take 2 tablets (50 mg total) by mouth once daily. 180 tablet 3    MAGNESIUM CARBONATE ORAL Take 600 mg by mouth.      meloxicam (MOBIC) 15 MG tablet Take 1 tablet (15 mg total) by mouth once daily. 30 tablet 1    methotrexate 2.5 MG Tab Take 2 tablets (5 mg total) by mouth every 7 days. 24 tablet 0    omega-3 fatty acids/fish oil (FISH OIL-OMEGA-3 FATTY ACIDS) 300-1,000 mg capsule Take 2 g by mouth.      ondansetron (ZOFRAN-ODT) 4 MG TbDL Take 1 tablet (4 mg total) by mouth every 8 (eight) hours as needed (nausea). 15 tablet 0    pantoprazole (PROTONIX) 40 MG tablet Take 1 tablet (40 mg total) by mouth once daily. 90 tablet 3     "traZODone (DESYREL) 50 MG tablet Take 1 tablet (50 mg total) by mouth nightly as needed for Insomnia. 30 tablet 5    upadacitinib (RINVOQ) 15 mg 24 hr tablet Take 1 tablet (15 mg total) by mouth once daily. 30 tablet 11    vitamin E 400 UNIT capsule Take 400 Units by mouth.      zinc gluconate 50 mg tablet Take 50 mg by mouth.       No current facility-administered medications on file prior to visit.          PHYSICAL EXAM   height is 5' 2.5" (1.588 m) and weight is 71.2 kg (156 lb 15.5 oz).   Body mass index is 28.25 kg/m².      All other systems deferred.  GENERAL:  No acute distress  HABITUS: Normal  GAIT: Antalgic  SKIN: Normal     KNEE EXAM:    right:   Effusion: Small joint effusion  TTP: Yes over Medial Joint Line   Yes crepitus with passive knee ROM  Passive ROM: Extension 0, Flexion 130  No pain with manipulation of patella  Stable to varus/valgus stress. No increased laxity to anterior/posterior drawer testing  positive Ismael's test  No pain with IR/ER rotation of the hip  5/5 strength in knee flexion and extension, ankle plantarflexion and dorsiflexion  Neurovascular Status: Sensation intact to light touch in Sural, Saphenous, SPN, DPN, Tibial nerve distribution  2+ pulse DP/PT, normal capillary refill, foot has normal warmth    DATA:  Diagnostic tests reviewed for today's visit:     4v of the knee reveal Moderate degenerative changes of the Medial compartment. There is evidence of advanced osteoarthritis changes with Joint space narrowing. The limb is in varus alignment. The patella is tracking midline.    "

## 2024-04-08 ENCOUNTER — OFFICE VISIT (OUTPATIENT)
Dept: DERMATOLOGY | Facility: CLINIC | Age: 69
End: 2024-04-08
Payer: MEDICARE

## 2024-04-08 ENCOUNTER — PATIENT MESSAGE (OUTPATIENT)
Dept: DERMATOLOGY | Facility: CLINIC | Age: 69
End: 2024-04-08

## 2024-04-08 DIAGNOSIS — Z85.828 HISTORY OF NONMELANOMA SKIN CANCER: ICD-10-CM

## 2024-04-08 DIAGNOSIS — C44.91 SUPERFICIAL BASAL CELL CARCINOMA: Primary | ICD-10-CM

## 2024-04-08 DIAGNOSIS — L82.1 SK (SEBORRHEIC KERATOSIS): ICD-10-CM

## 2024-04-08 DIAGNOSIS — L81.4 LENTIGINES: ICD-10-CM

## 2024-04-08 DIAGNOSIS — L57.0 ACTINIC KERATOSIS: ICD-10-CM

## 2024-04-08 PROCEDURE — 1159F MED LIST DOCD IN RCRD: CPT | Mod: CPTII,S$GLB,, | Performed by: DERMATOLOGY

## 2024-04-08 PROCEDURE — 1160F RVW MEDS BY RX/DR IN RCRD: CPT | Mod: CPTII,S$GLB,, | Performed by: DERMATOLOGY

## 2024-04-08 PROCEDURE — 99999 PR PBB SHADOW E&M-EST. PATIENT-LVL IV: CPT | Mod: PBBFAC,,, | Performed by: DERMATOLOGY

## 2024-04-08 PROCEDURE — 99214 OFFICE O/P EST MOD 30 MIN: CPT | Mod: S$GLB,,, | Performed by: DERMATOLOGY

## 2024-04-08 PROCEDURE — 1101F PT FALLS ASSESS-DOCD LE1/YR: CPT | Mod: CPTII,S$GLB,, | Performed by: DERMATOLOGY

## 2024-04-08 PROCEDURE — 3288F FALL RISK ASSESSMENT DOCD: CPT | Mod: CPTII,S$GLB,, | Performed by: DERMATOLOGY

## 2024-04-08 PROCEDURE — 1126F AMNT PAIN NOTED NONE PRSNT: CPT | Mod: CPTII,S$GLB,, | Performed by: DERMATOLOGY

## 2024-04-08 PROCEDURE — 4010F ACE/ARB THERAPY RXD/TAKEN: CPT | Mod: CPTII,S$GLB,, | Performed by: DERMATOLOGY

## 2024-04-08 RX ORDER — FLUOROURACIL 50 MG/G
CREAM TOPICAL
Qty: 40 G | Refills: 1 | Status: SHIPPED | OUTPATIENT
Start: 2024-04-08

## 2024-04-08 NOTE — PROGRESS NOTES
Subjective:      Patient ID:  Lula De Leon is a 68 y.o. female who presents for   Chief Complaint   Patient presents with    Skin Check     Ubse & legs     History of Present Illness: The patient presents for follow up of skin check.    The patient was last seen on: 3/6/2023 for cryosurgery to actinic keratoses which have resolved.   This is a high risk patient here to check for the development of new lesions.    Other skin complaints:   Patient with new complaint of lesion(s)  Location: Bilateral legs  Duration: 6 months  Symptoms: none  Relieving factors/Previous treatments: none    On Rinvoq x 2 years for RA (well controlled)            Review of Systems   Skin:  Positive for daily sunscreen use, activity-related sunscreen use and wears hat (activities). Negative for recent sunburn.   Hematologic/Lymphatic: Does not bruise/bleed easily.       Objective:   Physical Exam   Constitutional: She appears well-developed and well-nourished. No distress.   Neurological: She is alert and oriented to person, place, and time. She is not disoriented.   Psychiatric: She has a normal mood and affect.   Skin:   Areas Examined (abnormalities noted in diagram):   Head / Face Inspection Performed  Neck Inspection Performed  Chest / Axilla Inspection Performed  Back Inspection Performed  RUE Inspected  LUE Inspection Performed  RLE Inspected  LLE Inspection Performed                 Diagram Legend     Erythematous scaling macule/papule c/w actinic keratosis       Vascular papule c/w angioma      Pigmented verrucoid papule/plaque c/w seborrheic keratosis      Yellow umbilicated papule c/w sebaceous hyperplasia      Irregularly shaped tan macule c/w lentigo     1-2 mm smooth white papules consistent with Milia      Movable subcutaneous cyst with punctum c/w epidermal inclusion cyst      Subcutaneous movable cyst c/w pilar cyst      Firm pink to brown papule c/w dermatofibroma      Pedunculated fleshy papule(s) c/w skin tag(s)       Evenly pigmented macule c/w junctional nevus     Mildly variegated pigmented, slightly irregular-bordered macule c/w mildly atypical nevus      Flesh colored to evenly pigmented papule c/w intradermal nevus       Pink pearly papule/plaque c/w basal cell carcinoma      Erythematous hyperkeratotic cursted plaque c/w SCC      Surgical scar with no sign of skin cancer recurrence      Open and closed comedones      Inflammatory papules and pustules      Verrucoid papule consistent consistent with wart     Erythematous eczematous patches and plaques     Dystrophic onycholytic nail with subungual debris c/w onychomycosis     Umbilicated papule    Erythematous-base heme-crusted tan verrucoid plaque consistent with inflamed seborrheic keratosis     Erythematous Silvery Scaling Plaque c/w Psoriasis     See annotation                Assessment / Plan:        Superficial basal cell carcinomas x 2 - bilateral lower legs  Efudex cream AAA bilateral lower legs 2x/day for 4 weeks and     Actinic keratosis - right medial cheek  Efudex cream right medial cheek 2x/day x 2 weeks.     SK (seborrheic keratosis)  These are benign inherited growths without a malignant potential. Reassurance given to patient. No treatment is necessary.       Lentigines   - minor problem and chronic.   Reassurance given to patient. No treatment necessary.     History of nonmelanoma skin cancer  Area(s) of previous NMSC evaluated with no signs of recurrence.    Upper body skin examination performed today including at least 6 points as noted in physical examination. Suspicious lesions noted.    Recommend daily sun protection/avoidance and use of at least SPF 30, broad spectrum sunscreen (OTC drug).                Follow up in about 3 months (around 7/8/2024).

## 2024-04-08 NOTE — PATIENT INSTRUCTIONS
Field Treatment for Actinic Keratoses (precancerous lesions)    5-Fluorouracil - This is a topical chemotherapy for your skin. This cream should never be applied without discussing with you dermatologist.    This treatment gets your immune system involved in fighting precancers (actinic keratoses), even those we can't yet see.     HOW TO APPLY: Apply a fingertip length amount to the specific areas bilateral lower legs 2x/day for 4 weeks and right medial cheek 2x/day x 2 weeks. Wash your hands carefully after applying the cream and wipe glasses, BIPAP/CPAP machines, or anything else that comes in frequent contact with the cream.    WHAT TO EXPECT: Your skin will likely become red, crusted, sore, and tender during the treatment usually starting around day 3 and continuing for about 1 week after last application. Your skin may take up to 6 weeks to return to its normal coloring (lose the pink).     HOW TO HEAL FAST: To speed healing, wash with a gentle wash and apply Vaseline jelly especially to crusted open areas.    WE CAN HELP: Please contact us for any questions or problem shooting the application of these creams: [phone] or [email or web address]    IT WILL BE WORTH IT!!!

## 2024-04-22 ENCOUNTER — LAB VISIT (OUTPATIENT)
Dept: LAB | Facility: HOSPITAL | Age: 69
End: 2024-04-22
Attending: STUDENT IN AN ORGANIZED HEALTH CARE EDUCATION/TRAINING PROGRAM
Payer: MEDICARE

## 2024-04-22 DIAGNOSIS — Z79.899 IMMUNODEFICIENCY DUE TO TREATMENT WITH IMMUNOSUPPRESSIVE MEDICATION: ICD-10-CM

## 2024-04-22 DIAGNOSIS — M06.9 RHEUMATOID ARTHRITIS INVOLVING BOTH HANDS, UNSPECIFIED WHETHER RHEUMATOID FACTOR PRESENT: ICD-10-CM

## 2024-04-22 DIAGNOSIS — D84.821 IMMUNODEFICIENCY DUE TO TREATMENT WITH IMMUNOSUPPRESSIVE MEDICATION: ICD-10-CM

## 2024-04-22 LAB
ALBUMIN SERPL BCP-MCNC: 4 G/DL (ref 3.5–5.2)
ALP SERPL-CCNC: 53 U/L (ref 55–135)
ALT SERPL W/O P-5'-P-CCNC: 42 U/L (ref 10–44)
ANION GAP SERPL CALC-SCNC: 6 MMOL/L (ref 8–16)
AST SERPL-CCNC: 37 U/L (ref 10–40)
BASOPHILS # BLD AUTO: 0.03 K/UL (ref 0–0.2)
BASOPHILS NFR BLD: 0.4 % (ref 0–1.9)
BILIRUB SERPL-MCNC: 0.4 MG/DL (ref 0.1–1)
BUN SERPL-MCNC: 18 MG/DL (ref 8–23)
CALCIUM SERPL-MCNC: 10.3 MG/DL (ref 8.7–10.5)
CHLORIDE SERPL-SCNC: 108 MMOL/L (ref 95–110)
CO2 SERPL-SCNC: 27 MMOL/L (ref 23–29)
CREAT SERPL-MCNC: 0.9 MG/DL (ref 0.5–1.4)
DIFFERENTIAL METHOD BLD: ABNORMAL
EOSINOPHIL # BLD AUTO: 0.1 K/UL (ref 0–0.5)
EOSINOPHIL NFR BLD: 1.3 % (ref 0–8)
ERYTHROCYTE [DISTWIDTH] IN BLOOD BY AUTOMATED COUNT: 14.5 % (ref 11.5–14.5)
EST. GFR  (NO RACE VARIABLE): >60 ML/MIN/1.73 M^2
GLUCOSE SERPL-MCNC: 84 MG/DL (ref 70–110)
HCT VFR BLD AUTO: 37.4 % (ref 37–48.5)
HGB BLD-MCNC: 11.7 G/DL (ref 12–16)
IMM GRANULOCYTES # BLD AUTO: 0.02 K/UL (ref 0–0.04)
IMM GRANULOCYTES NFR BLD AUTO: 0.3 % (ref 0–0.5)
LYMPHOCYTES # BLD AUTO: 1.7 K/UL (ref 1–4.8)
LYMPHOCYTES NFR BLD: 25.4 % (ref 18–48)
MCH RBC QN AUTO: 32.4 PG (ref 27–31)
MCHC RBC AUTO-ENTMCNC: 31.3 G/DL (ref 32–36)
MCV RBC AUTO: 104 FL (ref 82–98)
MONOCYTES # BLD AUTO: 0.6 K/UL (ref 0.3–1)
MONOCYTES NFR BLD: 8.6 % (ref 4–15)
NEUTROPHILS # BLD AUTO: 4.3 K/UL (ref 1.8–7.7)
NEUTROPHILS NFR BLD: 64 % (ref 38–73)
NRBC BLD-RTO: 0 /100 WBC
PLATELET # BLD AUTO: 305 K/UL (ref 150–450)
PMV BLD AUTO: 8.8 FL (ref 9.2–12.9)
POTASSIUM SERPL-SCNC: 4.7 MMOL/L (ref 3.5–5.1)
PROT SERPL-MCNC: 7.4 G/DL (ref 6–8.4)
RBC # BLD AUTO: 3.61 M/UL (ref 4–5.4)
SODIUM SERPL-SCNC: 141 MMOL/L (ref 136–145)
WBC # BLD AUTO: 6.77 K/UL (ref 3.9–12.7)

## 2024-04-22 PROCEDURE — 80053 COMPREHEN METABOLIC PANEL: CPT | Performed by: STUDENT IN AN ORGANIZED HEALTH CARE EDUCATION/TRAINING PROGRAM

## 2024-04-22 PROCEDURE — 36415 COLL VENOUS BLD VENIPUNCTURE: CPT | Performed by: STUDENT IN AN ORGANIZED HEALTH CARE EDUCATION/TRAINING PROGRAM

## 2024-04-22 PROCEDURE — 85025 COMPLETE CBC W/AUTO DIFF WBC: CPT | Performed by: STUDENT IN AN ORGANIZED HEALTH CARE EDUCATION/TRAINING PROGRAM

## 2024-04-24 DIAGNOSIS — G47.00 INSOMNIA, UNSPECIFIED TYPE: ICD-10-CM

## 2024-04-24 RX ORDER — TRAZODONE HYDROCHLORIDE 50 MG/1
50 TABLET ORAL NIGHTLY PRN
Qty: 30 TABLET | Refills: 5 | Status: SHIPPED | OUTPATIENT
Start: 2024-04-24 | End: 2025-04-24

## 2024-04-24 NOTE — TELEPHONE ENCOUNTER
Care Due:                  Date            Visit Type   Department     Provider  --------------------------------------------------------------------------------                                [facility]                              PRIMARY      MEDICINE/  Last Visit: 09-      CARE ([facility])   INTERNAL MED   Sylvain Haywood                              [facility]                              PRIMARY      MEDICINE/  Next Visit: 05-      CARE ([facility])   INTERNAL MED   Sylvain Haywood                                                            Last  Test          Frequency    Reason                     Performed    Due Date  --------------------------------------------------------------------------------    Lipid Panel.  12 months..  atorvastatin.............  05- 05-    [facility] Embedded Care Due Messages. Reference number: [id number].   4/24/2024 11:21:47 AM CDT

## 2024-05-02 ENCOUNTER — PATIENT OUTREACH (OUTPATIENT)
Dept: ADMINISTRATIVE | Facility: HOSPITAL | Age: 69
End: 2024-05-02
Payer: MEDICARE

## 2024-05-02 ENCOUNTER — PATIENT MESSAGE (OUTPATIENT)
Dept: ADMINISTRATIVE | Facility: HOSPITAL | Age: 69
End: 2024-05-02
Payer: MEDICARE

## 2024-05-02 NOTE — PROGRESS NOTES
Portal message reminder sent to collect and return at home Fit kit test. Immunization's updated/triggered.

## 2024-05-03 ENCOUNTER — LAB VISIT (OUTPATIENT)
Dept: LAB | Facility: HOSPITAL | Age: 69
End: 2024-05-03
Payer: MEDICARE

## 2024-05-03 DIAGNOSIS — Z00.00 HEALTHCARE MAINTENANCE: ICD-10-CM

## 2024-05-03 DIAGNOSIS — R79.9 ABNORMAL FINDING OF BLOOD CHEMISTRY, UNSPECIFIED: ICD-10-CM

## 2024-05-03 LAB
25(OH)D3+25(OH)D2 SERPL-MCNC: 56 NG/ML (ref 30–96)
ALBUMIN SERPL BCP-MCNC: 4.2 G/DL (ref 3.5–5.2)
ALP SERPL-CCNC: 55 U/L (ref 55–135)
ALT SERPL W/O P-5'-P-CCNC: 38 U/L (ref 10–44)
ANION GAP SERPL CALC-SCNC: 7 MMOL/L (ref 8–16)
AST SERPL-CCNC: 30 U/L (ref 10–40)
BASOPHILS # BLD AUTO: 0.02 K/UL (ref 0–0.2)
BASOPHILS NFR BLD: 0.5 % (ref 0–1.9)
BILIRUB SERPL-MCNC: 0.6 MG/DL (ref 0.1–1)
BUN SERPL-MCNC: 20 MG/DL (ref 8–23)
CALCIUM SERPL-MCNC: 10.6 MG/DL (ref 8.7–10.5)
CHLORIDE SERPL-SCNC: 107 MMOL/L (ref 95–110)
CHOLEST SERPL-MCNC: 183 MG/DL (ref 120–199)
CHOLEST/HDLC SERPL: 2.5 {RATIO} (ref 2–5)
CO2 SERPL-SCNC: 27 MMOL/L (ref 23–29)
CREAT SERPL-MCNC: 1 MG/DL (ref 0.5–1.4)
DIFFERENTIAL METHOD BLD: ABNORMAL
EOSINOPHIL # BLD AUTO: 0 K/UL (ref 0–0.5)
EOSINOPHIL NFR BLD: 1 % (ref 0–8)
ERYTHROCYTE [DISTWIDTH] IN BLOOD BY AUTOMATED COUNT: 14.9 % (ref 11.5–14.5)
EST. GFR  (NO RACE VARIABLE): >60 ML/MIN/1.73 M^2
ESTIMATED AVG GLUCOSE: 105 MG/DL (ref 68–131)
GLUCOSE SERPL-MCNC: 91 MG/DL (ref 70–110)
HBA1C MFR BLD: 5.3 % (ref 4–5.6)
HCT VFR BLD AUTO: 37.4 % (ref 37–48.5)
HDLC SERPL-MCNC: 73 MG/DL (ref 40–75)
HDLC SERPL: 39.9 % (ref 20–50)
HGB BLD-MCNC: 11.6 G/DL (ref 12–16)
IMM GRANULOCYTES # BLD AUTO: 0.01 K/UL (ref 0–0.04)
IMM GRANULOCYTES NFR BLD AUTO: 0.2 % (ref 0–0.5)
LDLC SERPL CALC-MCNC: 95 MG/DL (ref 63–159)
LYMPHOCYTES # BLD AUTO: 1.4 K/UL (ref 1–4.8)
LYMPHOCYTES NFR BLD: 33.8 % (ref 18–48)
MCH RBC QN AUTO: 32.9 PG (ref 27–31)
MCHC RBC AUTO-ENTMCNC: 31 G/DL (ref 32–36)
MCV RBC AUTO: 106 FL (ref 82–98)
MONOCYTES # BLD AUTO: 0.5 K/UL (ref 0.3–1)
MONOCYTES NFR BLD: 11.4 % (ref 4–15)
NEUTROPHILS # BLD AUTO: 2.2 K/UL (ref 1.8–7.7)
NEUTROPHILS NFR BLD: 53.1 % (ref 38–73)
NONHDLC SERPL-MCNC: 110 MG/DL
NRBC BLD-RTO: 0 /100 WBC
PLATELET # BLD AUTO: 345 K/UL (ref 150–450)
PMV BLD AUTO: 9.4 FL (ref 9.2–12.9)
POTASSIUM SERPL-SCNC: 4.5 MMOL/L (ref 3.5–5.1)
PROT SERPL-MCNC: 7.6 G/DL (ref 6–8.4)
RBC # BLD AUTO: 3.53 M/UL (ref 4–5.4)
SODIUM SERPL-SCNC: 141 MMOL/L (ref 136–145)
TRIGL SERPL-MCNC: 75 MG/DL (ref 30–150)
TSH SERPL DL<=0.005 MIU/L-ACNC: 1.87 UIU/ML (ref 0.4–4)
WBC # BLD AUTO: 4.14 K/UL (ref 3.9–12.7)

## 2024-05-03 PROCEDURE — 80053 COMPREHEN METABOLIC PANEL: CPT | Performed by: INTERNAL MEDICINE

## 2024-05-03 PROCEDURE — 84443 ASSAY THYROID STIM HORMONE: CPT | Mod: GA | Performed by: INTERNAL MEDICINE

## 2024-05-03 PROCEDURE — 80061 LIPID PANEL: CPT | Performed by: INTERNAL MEDICINE

## 2024-05-03 PROCEDURE — 36415 COLL VENOUS BLD VENIPUNCTURE: CPT | Mod: PN | Performed by: INTERNAL MEDICINE

## 2024-05-03 PROCEDURE — 82306 VITAMIN D 25 HYDROXY: CPT | Mod: GA | Performed by: INTERNAL MEDICINE

## 2024-05-03 PROCEDURE — 85025 COMPLETE CBC W/AUTO DIFF WBC: CPT | Performed by: INTERNAL MEDICINE

## 2024-05-03 PROCEDURE — 83036 HEMOGLOBIN GLYCOSYLATED A1C: CPT | Performed by: INTERNAL MEDICINE

## 2024-05-07 ENCOUNTER — OFFICE VISIT (OUTPATIENT)
Dept: FAMILY MEDICINE | Facility: CLINIC | Age: 69
End: 2024-05-07
Payer: MEDICARE

## 2024-05-07 VITALS
HEIGHT: 63 IN | OXYGEN SATURATION: 99 % | TEMPERATURE: 98 F | SYSTOLIC BLOOD PRESSURE: 130 MMHG | DIASTOLIC BLOOD PRESSURE: 64 MMHG | WEIGHT: 155 LBS | BODY MASS INDEX: 27.46 KG/M2 | HEART RATE: 86 BPM

## 2024-05-07 DIAGNOSIS — E78.5 HYPERLIPIDEMIA, UNSPECIFIED HYPERLIPIDEMIA TYPE: ICD-10-CM

## 2024-05-07 DIAGNOSIS — Q27.39: ICD-10-CM

## 2024-05-07 DIAGNOSIS — C44.211 BASAL CELL CARCINOMA (BCC) OF SKIN OF EAR, UNSPECIFIED LATERALITY: ICD-10-CM

## 2024-05-07 DIAGNOSIS — E66.3 OVERWEIGHT (BMI 25.0-29.9): ICD-10-CM

## 2024-05-07 DIAGNOSIS — Z78.0 ASYMPTOMATIC POSTMENOPAUSAL STATE: ICD-10-CM

## 2024-05-07 DIAGNOSIS — Z12.11 COLON CANCER SCREENING: ICD-10-CM

## 2024-05-07 DIAGNOSIS — I70.0 AORTIC ATHEROSCLEROSIS: ICD-10-CM

## 2024-05-07 DIAGNOSIS — Z00.00 HEALTHCARE MAINTENANCE: ICD-10-CM

## 2024-05-07 DIAGNOSIS — M06.9 RHEUMATOID ARTHRITIS INVOLVING BOTH HANDS, UNSPECIFIED WHETHER RHEUMATOID FACTOR PRESENT: ICD-10-CM

## 2024-05-07 DIAGNOSIS — Z00.00 ANNUAL PHYSICAL EXAM: Primary | ICD-10-CM

## 2024-05-07 DIAGNOSIS — I10 ESSENTIAL HYPERTENSION: ICD-10-CM

## 2024-05-07 DIAGNOSIS — E83.52 HIGH CALCIUM LEVELS: ICD-10-CM

## 2024-05-07 PROCEDURE — 1160F RVW MEDS BY RX/DR IN RCRD: CPT | Mod: CPTII,S$GLB,, | Performed by: INTERNAL MEDICINE

## 2024-05-07 PROCEDURE — 3075F SYST BP GE 130 - 139MM HG: CPT | Mod: CPTII,S$GLB,, | Performed by: INTERNAL MEDICINE

## 2024-05-07 PROCEDURE — 99999 PR PBB SHADOW E&M-EST. PATIENT-LVL V: CPT | Mod: PBBFAC,,, | Performed by: INTERNAL MEDICINE

## 2024-05-07 PROCEDURE — 4010F ACE/ARB THERAPY RXD/TAKEN: CPT | Mod: CPTII,S$GLB,, | Performed by: INTERNAL MEDICINE

## 2024-05-07 PROCEDURE — 3078F DIAST BP <80 MM HG: CPT | Mod: CPTII,S$GLB,, | Performed by: INTERNAL MEDICINE

## 2024-05-07 PROCEDURE — 1125F AMNT PAIN NOTED PAIN PRSNT: CPT | Mod: CPTII,S$GLB,, | Performed by: INTERNAL MEDICINE

## 2024-05-07 PROCEDURE — 3044F HG A1C LEVEL LT 7.0%: CPT | Mod: CPTII,S$GLB,, | Performed by: INTERNAL MEDICINE

## 2024-05-07 PROCEDURE — 99214 OFFICE O/P EST MOD 30 MIN: CPT | Mod: S$GLB,,, | Performed by: INTERNAL MEDICINE

## 2024-05-07 PROCEDURE — 3288F FALL RISK ASSESSMENT DOCD: CPT | Mod: CPTII,S$GLB,, | Performed by: INTERNAL MEDICINE

## 2024-05-07 PROCEDURE — 1159F MED LIST DOCD IN RCRD: CPT | Mod: CPTII,S$GLB,, | Performed by: INTERNAL MEDICINE

## 2024-05-07 PROCEDURE — 3008F BODY MASS INDEX DOCD: CPT | Mod: CPTII,S$GLB,, | Performed by: INTERNAL MEDICINE

## 2024-05-07 PROCEDURE — 1101F PT FALLS ASSESS-DOCD LE1/YR: CPT | Mod: CPTII,S$GLB,, | Performed by: INTERNAL MEDICINE

## 2024-05-07 RX ORDER — PHENTERMINE HYDROCHLORIDE 37.5 MG/1
37.5 TABLET ORAL
Qty: 30 TABLET | Refills: 2 | Status: SHIPPED | OUTPATIENT
Start: 2024-05-07 | End: 2024-08-05

## 2024-05-07 NOTE — PATIENT INSTRUCTIONS
[facility]  [address]  [place] [address]  [phone]    [facility]  [address]  [place] [id number] [phone]    [facility]  [address]  [phone]    [facility]  [address]  [place] [phone]    [facility]  Visit  [email or web address]  For your closest location.      SEATED RESISTANCE BAND EXERCISES     If you do not have a resistance band, or do not feel comfortable using a resistance band, these exercises can also be done holding a light hand weight or water bottle.  If you are just starting to exercise, you may want to go through the motions without any weights or resistance till you become comfortable with the movements.     Do each of the movements shown 10 times (10 repetitions). You can repeat the exercises a second or  third time as well for greater benefit. The amount of tension on the resistance bands should be adjusted so  you can complete one set of 10 repetitions with effort. Increase the tension every few weeks. Do these  exercises 2 or 3 times a week.     * If an exercise hurts your back or joints, stop doing that particular exercise, but keep doing all the others *        CHEST EXERCISE          Start Position:   Sit tall, with feet shoulder width apart and feet in front of knees.   Belly pulled in.   Place the band around your upper back, grasp band in each hand, knuckles (rings) facing front. Arms  should be bent so that knuckles are in front of elbows   Slide shoulder blades down your back and slightly together (as if making a V).   Relax your neck.     To Perform This Exercise:   Press hands forward to lengthen arms using chest muscles (not arms!).   Dont arch your back!)   Return to start position and repeat 10 times.   Breathe!           BACK EXERCISE          Start Position:   Sit tall, with feet shoulder width apart and feet in front of knees.   Belly pulled in.   Grasp  band in each hand and raise overhead. Arms should be slightly wider than shoulder width and no  slack in the band.   Slide shoulder blades down your back and slightly together (as if making a V).   Relax your neck.     To Perform This Exercise:   Open arms pulling down towards chest using upper back muscles (not arms!).   Squeeze through shoulder blades at the bottom of the movement (dont arch your back!).   Return to start position and repeat 10 times.   Breathe!           SHOULDER EXERCISE          Start Position:   Sit tall, with feet shoulder width apart and feet in front of knees.   Belly pulled in.   Sit on band so that you can grasp band in one hand with tension on the band, but not so much tension that  you cannot straighten the arm. It may take a few tries to find the right amount of tension.   Slide shoulder blades down your back and slightly together (as if making a V).   Relax your neck.     To Perform This Exercise:   Press fist to the ceiling, slightly in front of the body.   SLOWLY return to start position and repeat 10 times.   Switch sides and repeat on the other side.   Breathe!           TRICEPS EXERCISE          Start Position:   Sit tall, with feet shoulder width apart and feet in front of knees.   Belly pulled in.   Sit on one end of the band. Grasp other end of band in one hand.   Point elbow directly toward the ceiling (if this is difficult, you may support the upper arm with the opposite  hand)   Be sure there is no slack in the band in the starting position.   Slide shoulder blades down your back and slightly together (as if making a V).   Relax your neck.     To Perform This Exercise:   Extend your arm up to the ceiling, as shown.   Squeeze through shoulder blades at the bottom of the movement (dont arch your back!).   SLOWLY return to start position and repeat 10 times.   Repeat on the other side.   Breathe!           BICEP EXERCISE          Start Position:   Sit tall, with feet  shoulder width apart and feet in front of knees.   Belly pulled in.   Place center of exercise band under one foot and step the end of the band under the other foot.   Grasp each end of the band with one hand. Make sure there is no slack between your foot and the hand  that holds the band.   Hold your elbows to your sides.   Pull abdominals in, lift chest, press shoulders down and back.     To Perform This Exercise:   As you curl up, keep your wrist from changing position in relation to your forearm and your arm stable  from the shoulder to the elbow.   Bend and straighten your elbow in a slow and controlled movement. Repeat this motion 10 times.   Repeat on the other side.   Breathe!

## 2024-05-07 NOTE — PROGRESS NOTES
HISTORY OF PRESENT ILLNESS:  Lula De Leon is a 68 y.o. female who presents to the clinic today for Annual Exam    Last seen by me 2023.    Notes she twisted her right ankle when tripped over a bag of mulch and symptoms are improving with conservative measures.    Anxiety  Prescribed buspirone and trazodone at last visit.     RA  Stable on Rinvoq, MTX and followed by rheum.  Symptoms improved.     Congenital dilatation brachiocephalic artery  Due to see vasc surgery.    Knee pain  Seen by ortho.    Seen by derm recently for skin check.    PAST MEDICAL HISTORY:  Past Medical History:   Diagnosis Date    Basal cell carcinoma of right ear 10/18/2022    Rheumatoid arthritis        PAST SURGICAL HISTORY:  Past Surgical History:   Procedure Laterality Date    BREAST LUMPECTOMY Right     exc biopsy    BREAST SURGERY      lobectomy 2017 - [facility]    CARPAL TUNNEL RELEASE Right     SINUS SURGERY  2017    Dr. Christel FLORES and Dr. Chilel       SOCIAL HISTORY:  Social History     Socioeconomic History    Marital status:    Occupational History    Occupation: retired - used to make prosthesis and dental implants   Tobacco Use    Smoking status: Former     Current packs/day: 0.00     Average packs/day: 1 pack/day for 6.0 years (6.0 ttl pk-yrs)     Types: Cigarettes     Start date:      Quit date:      Years since quittin.3    Smokeless tobacco: Never   Substance and Sexual Activity    Alcohol use: Yes     Alcohol/week: 2.0 standard drinks of alcohol     Types: 2 Glasses of wine per week     Comment: 3/4 x a week     Social Determinants of Health     Financial Resource Strain: Low Risk  (2024)    Overall Financial Resource Strain (CARDIA)     Difficulty of Paying Living Expenses: Not hard at all   Food Insecurity: No Food Insecurity (2024)    Hunger Vital Sign     Worried About Running Out of Food in the Last Year: Never true     Ran Out of Food in the Last Year: Never true    Transportation Needs: No Transportation Needs (4/1/2024)    PRAPARE - Transportation     Lack of Transportation (Medical): No     Lack of Transportation (Non-Medical): No   Physical Activity: Insufficiently Active (4/1/2024)    Exercise Vital Sign     Days of Exercise per Week: 2 days     Minutes of Exercise per Session: 10 min   Stress: No Stress Concern Present (4/1/2024)    [employer] - Occupational Stress Questionnaire     Feeling of Stress : Not at all   Housing Stability: High Risk (4/1/2024)    Housing Stability Vital Sign     Unable to Pay for Housing in the Last Year: Yes     Number of Places Lived in the Last Year: 1     Unstable Housing in the Last Year: Yes       FAMILY HISTORY:  Family History   Problem Relation Name Age of Onset    Arthritis Mother      Aneurysm Mother  64    Heart attack Father  70    Epilepsy Sister         ALLERGIES AND MEDICATIONS: updated and reviewed.  Review of patient's allergies indicates:  No Known Allergies  Medication List with Changes/Refills   New Medications    PHENTERMINE (ADIPEX-P) 37.5 MG TABLET    Take 1 tablet (37.5 mg total) by mouth before breakfast.   Current Medications    ASCORBIC ACID, VITAMIN C, (VITAMIN C) 500 MG TABLET    Take 500 mg by mouth.    ATORVASTATIN (LIPITOR) 20 MG TABLET    Take 1 tablet (20 mg total) by mouth once daily.    BETA CAROTENE 04282 UNIT CAPSULE    Take 25,000 Units by mouth.    BUSPIRONE (BUSPAR) 10 MG TABLET    Take 1 tablet (10 mg total) by mouth 2 (two) times daily.    CALCIUM CARBONATE (OS-ONEIDA) 600 MG CALCIUM (1,500 MG) TAB    Take 600 mg by mouth.    CETIRIZINE (ZYRTEC) 10 MG TABLET    Take 10 mg by mouth.    CYANOCOBALAMIN (VITAMIN B-12) 1000 MCG TABLET    Take 1,000 mcg by mouth.    DICLOFENAC SODIUM (VOLTAREN) 1 % GEL    Apply 2 g topically 4 (four) times daily.    FAWAD PRIM-LINOLEIC-GAMOLENIC AC (PRIMROSE OIL) 1,000 MG CAP    Take by mouth.    FLAXSEED OIL OIL    800 mg by Other route.     FLUOROURACIL (EFUDEX) 5 % CREAM    Apply to specific areas bilateral lower legs 2x/day for 4 weeks and right medial cheek 2x/day x 2 weeks    FOLIC ACID (FOLVITE) 1 MG TABLET    Take 1 tablet (1 mg total) by mouth once daily.    IRON BISGLY,PS-FA-B-C#12-SUCC 65 MG-65 MG -1,000 MCG (24) TAB    Take by mouth.    LOSARTAN (COZAAR) 25 MG TABLET    Take 2 tablets (50 mg total) by mouth once daily.    MAGNESIUM CARBONATE ORAL    Take 600 mg by mouth.    MELOXICAM (MOBIC) 15 MG TABLET    Take 1 tablet (15 mg total) by mouth once daily.    METHOTREXATE 2.5 MG TAB    Take 2 tablets (5 mg total) by mouth every 7 days.    OMEGA-3 FATTY ACIDS/FISH OIL (FISH OIL-OMEGA-3 FATTY ACIDS) 300-1,000 MG CAPSULE    Take 2 g by mouth.    ONDANSETRON (ZOFRAN-ODT) 4 MG TBDL    Take 1 tablet (4 mg total) by mouth every 8 (eight) hours as needed (nausea).    PANTOPRAZOLE (PROTONIX) 40 MG TABLET    Take 1 tablet (40 mg total) by mouth once daily.    TRAZODONE (DESYREL) 50 MG TABLET    Take 1 tablet (50 mg total) by mouth nightly as needed for Insomnia.    UPADACITINIB (RINVOQ) 15 MG 24 HR TABLET    Take 1 tablet (15 mg total) by mouth once daily.    VITAMIN E 400 UNIT CAPSULE    Take 400 Units by mouth.    ZINC GLUCONATE 50 MG TABLET    Take 50 mg by mouth.          CARE TEAM:  Patient Care Team:  Sylvain Haywood MD as PCP - General (Internal Medicine)  Eliel Rodrigez MD as Consulting Physician (Ophthalmology)  [facility] [place]         REVIEW OF SYSTEMS:  Review of Systems   Constitutional:  Negative for chills and fever.   HENT:  Negative for congestion and postnasal drip.    Eyes:  Negative for photophobia and visual disturbance.   Respiratory:  Negative for cough and shortness of breath.    Cardiovascular:  Negative for chest pain and palpitations.   Gastrointestinal:  Negative for nausea and vomiting.   Genitourinary:  Negative for dysuria and frequency.   Musculoskeletal:  Positive for arthralgias  and joint swelling (ankle and knee).   Neurological:  Negative for light-headedness and headaches.   Psychiatric/Behavioral:  Negative for dysphoric mood. The patient is not nervous/anxious.          PHYSICAL EXAM:   Vitals:    05/07/24 0843   BP: 130/64   Pulse: 86   Temp: 97.8 °F (36.6 °C)             Body mass index is 27.9 kg/m².     General appearance - alert, well appearing, and in no distress  Mental status - alert, oriented to person, place, and time  Eyes - pupils equal and reactive, extraocular eye movements intact  Chest - clear to auscultation, no wheezes, rales or rhonchi, symmetric air entry  Heart - normal rate, regular rhythm, normal S1, S2, no murmurs, rubs, clicks or gallops  Neurological - alert, oriented, normal speech, no focal findings or movement disorder noted  Musculoskeletal - no joint tenderness, deformity or swelling  Extremities - peripheral pulses normal, no pedal edema, no clubbing or cyanosis      ASSESSMENT AND PLAN:  Annual physical exam  Healthcare maintenance  -     DXA Bone Density Axial Skeleton 1 or more sites; Future; Expected date: 05/07/2024  -     CALCIUM; Future; Expected date: 05/07/2024  -     Calcium, Ionized; Future; Expected date: 05/07/2024  - Recent blood work was reviewed with patient today.    Essential hypertension       - Stable on current medical management.    Congenital dilatation of brachiocephalic artery  -     Ambulatory referral/consult to Vascular Surgery; Future; Expected date: 05/14/2024    Aortic atherosclerosis  Hyperlipidemia, unspecified hyperlipidemia type        - Stable on statin tx.    Rheumatoid arthritis involving both hands, unspecified whether rheumatoid factor present        - Stable on current medical management.        - Follow up rheumatology.    High calcium levels  -     CALCIUM; Future; Expected date: 05/07/2024  -     Calcium, Ionized; Future; Expected date: 05/07/2024    Colon cancer screening        - Patient was explained  recommendation for age appropriate colon cancer screening with colonoscopy or Cologuard.  She voiced understanding but declines to proceed at this time.    Asymptomatic postmenopausal state  -     DXA Bone Density Axial Skeleton 1 or more sites; Future; Expected date: 05/07/2024    Basal cell carcinoma (BCC) of skin of ear, unspecified laterality        - Stable with follow up with dermatology.    Overweight  -     phentermine (ADIPEX-P) 37.5 mg tablet; Take 1 tablet (37.5 mg total) by mouth before breakfast.  Dispense: 30 tablet; Refill: 2  - Patient warned of common side effects of phentermine including anxiety, insomnia, palpitations and increased blood pressure. It was also explained that it is for short-term usage along with diet and exercise, and that stopping the medication without making lifestyle changes will result in regain of weight. Patient states understanding.     Patient was offered information on eligible vaccination but declines at this time.        Follow up one year or sooner as needed.

## 2024-05-14 ENCOUNTER — OFFICE VISIT (OUTPATIENT)
Dept: ORTHOPEDICS | Facility: CLINIC | Age: 69
End: 2024-05-14
Payer: MEDICARE

## 2024-05-14 ENCOUNTER — HOSPITAL ENCOUNTER (OUTPATIENT)
Dept: RADIOLOGY | Facility: HOSPITAL | Age: 69
Discharge: HOME | End: 2024-05-14
Attending: ORTHOPAEDIC SURGERY
Payer: MEDICARE

## 2024-05-14 VITALS — WEIGHT: 155 LBS | BODY MASS INDEX: 27.46 KG/M2 | HEIGHT: 63 IN

## 2024-05-14 DIAGNOSIS — S93.491A SPRAIN OF ANTERIOR TALOFIBULAR LIGAMENT OF RIGHT ANKLE, INITIAL ENCOUNTER: Primary | ICD-10-CM

## 2024-05-14 DIAGNOSIS — R52 PAIN: ICD-10-CM

## 2024-05-14 PROCEDURE — 1125F AMNT PAIN NOTED PAIN PRSNT: CPT | Mod: CPTII,S$GLB,, | Performed by: ORTHOPAEDIC SURGERY

## 2024-05-14 PROCEDURE — 99213 OFFICE O/P EST LOW 20 MIN: CPT | Mod: S$GLB,,, | Performed by: ORTHOPAEDIC SURGERY

## 2024-05-14 PROCEDURE — 99999 PR PBB SHADOW E&M-EST. PATIENT-LVL IV: CPT | Mod: PBBFAC,,, | Performed by: ORTHOPAEDIC SURGERY

## 2024-05-14 PROCEDURE — 1101F PT FALLS ASSESS-DOCD LE1/YR: CPT | Mod: CPTII,S$GLB,, | Performed by: ORTHOPAEDIC SURGERY

## 2024-05-14 PROCEDURE — 73610 X-RAY EXAM OF ANKLE: CPT | Mod: TC,RT

## 2024-05-14 PROCEDURE — 4010F ACE/ARB THERAPY RXD/TAKEN: CPT | Mod: CPTII,S$GLB,, | Performed by: ORTHOPAEDIC SURGERY

## 2024-05-14 PROCEDURE — 3288F FALL RISK ASSESSMENT DOCD: CPT | Mod: CPTII,S$GLB,, | Performed by: ORTHOPAEDIC SURGERY

## 2024-05-14 PROCEDURE — 73610 X-RAY EXAM OF ANKLE: CPT | Mod: 26,RT,, | Performed by: INTERNAL MEDICINE

## 2024-05-14 PROCEDURE — 3044F HG A1C LEVEL LT 7.0%: CPT | Mod: CPTII,S$GLB,, | Performed by: ORTHOPAEDIC SURGERY

## 2024-05-14 PROCEDURE — 1159F MED LIST DOCD IN RCRD: CPT | Mod: CPTII,S$GLB,, | Performed by: ORTHOPAEDIC SURGERY

## 2024-05-14 PROCEDURE — 3008F BODY MASS INDEX DOCD: CPT | Mod: CPTII,S$GLB,, | Performed by: ORTHOPAEDIC SURGERY

## 2024-05-14 NOTE — PROGRESS NOTES
DATE: 5/14/2024  PATIENT: Lula De Leon    CHIEF COMPLAINT: Right Ankle pain    HISTORY:  Lula De Leon is a 68 y.o. female with a PMHx of RA here for evaluation of right ankle pain. Around 2 weeks ago, patient twisted her right ankle on a bag of mulch. Most of her pain is over the anterolateral ankle/foot. She states the pain has not improved or worsened since the incident. She has been icing her ankle as well as taking tylenol and occasional ankle brace. She states the swelling is worse after ambulating or at the end of the day. She denies any numbness or tingling. Pain today is a 4/10.      PAST MEDICAL/SURGICAL HISTORY:  Past Medical History:   Diagnosis Date    Basal cell carcinoma of right ear 10/18/2022    Rheumatoid arthritis      Past Surgical History:   Procedure Laterality Date    BREAST LUMPECTOMY Right     exc biopsy    BREAST SURGERY      lobectomy 2017 - [facility] BR    CARPAL TUNNEL RELEASE Right     EYE SURGERY  early adulthood    SINUS SURGERY  2017    Dr. Christel FLORES and Dr. Chilel    TONSILLECTOMY  as a kid    TUBAL LIGATION  1993       Current Medications:   Current Outpatient Medications:     ascorbic acid, vitamin C, (VITAMIN C) 500 MG tablet, Take 500 mg by mouth., Disp: , Rfl:     atorvastatin (LIPITOR) 20 MG tablet, Take 1 tablet (20 mg total) by mouth once daily., Disp: 90 tablet, Rfl: 3    beta carotene 87167 UNIT capsule, Take 25,000 Units by mouth., Disp: , Rfl:     busPIRone (BUSPAR) 10 MG tablet, Take 1 tablet (10 mg total) by mouth 2 (two) times daily., Disp: 60 tablet, Rfl: 11    calcium carbonate (OS-ONEIDA) 600 mg calcium (1,500 mg) Tab, Take 600 mg by mouth., Disp: , Rfl:     cetirizine (ZYRTEC) 10 MG tablet, Take 10 mg by mouth., Disp: , Rfl:     cyanocobalamin (VITAMIN B-12) 1000 MCG tablet, Take 1,000 mcg by mouth., Disp: , Rfl:     diclofenac sodium (VOLTAREN) 1 % Gel, Apply 2 g topically 4 (four) times daily., Disp: 1 Tube, Rfl: 3    richa prim-linoleic-gamolenic ac  (PRIMROSE OIL) 1,000 mg Cap, Take by mouth., Disp: , Rfl:     flaxseed oil Oil, 800 mg by Other route., Disp: , Rfl:     fluorouraciL (EFUDEX) 5 % cream, Apply to specific areas bilateral lower legs 2x/day for 4 weeks and right medial cheek 2x/day x 2 weeks, Disp: 40 g, Rfl: 1    folic acid (FOLVITE) 1 MG tablet, Take 1 tablet (1 mg total) by mouth once daily., Disp: 90 tablet, Rfl: 1    iron bisgly,ps-FA-B-C#12-succ 65 mg-65 mg -1,000 mcg (24) Tab, Take by mouth., Disp: , Rfl:     losartan (COZAAR) 25 MG tablet, Take 2 tablets (50 mg total) by mouth once daily., Disp: 180 tablet, Rfl: 3    MAGNESIUM CARBONATE ORAL, Take 600 mg by mouth., Disp: , Rfl:     meloxicam (MOBIC) 15 MG tablet, Take 1 tablet (15 mg total) by mouth once daily., Disp: 30 tablet, Rfl: 1    methotrexate 2.5 MG Tab, Take 2 tablets (5 mg total) by mouth every 7 days., Disp: 24 tablet, Rfl: 0    omega-3 fatty acids/fish oil (FISH OIL-OMEGA-3 FATTY ACIDS) 300-1,000 mg capsule, Take 2 g by mouth., Disp: , Rfl:     ondansetron (ZOFRAN-ODT) 4 MG TbDL, Take 1 tablet (4 mg total) by mouth every 8 (eight) hours as needed (nausea)., Disp: 15 tablet, Rfl: 0    pantoprazole (PROTONIX) 40 MG tablet, Take 1 tablet (40 mg total) by mouth once daily., Disp: 90 tablet, Rfl: 3    phentermine (ADIPEX-P) 37.5 mg tablet, Take 1 tablet (37.5 mg total) by mouth before breakfast., Disp: 30 tablet, Rfl: 2    traZODone (DESYREL) 50 MG tablet, Take 1 tablet (50 mg total) by mouth nightly as needed for Insomnia., Disp: 30 tablet, Rfl: 5    upadacitinib (RINVOQ) 15 mg 24 hr tablet, Take 1 tablet (15 mg total) by mouth once daily., Disp: 30 tablet, Rfl: 11    vitamin E 400 UNIT capsule, Take 400 Units by mouth., Disp: , Rfl:     zinc gluconate 50 mg tablet, Take 50 mg by mouth., Disp: , Rfl:     Social History:   Social History     Socioeconomic History    Marital status:    Occupational History    Occupation: retired - used to make prosthesis and dental implants    Tobacco Use    Smoking status: Former     Current packs/day: 0.00     Average packs/day: 1 pack/day for 6.0 years (6.0 ttl pk-yrs)     Types: Cigarettes     Start date:      Quit date:      Years since quittin.3    Smokeless tobacco: Never    Tobacco comments:     quit when first got pregnant   Substance and Sexual Activity    Alcohol use: Yes     Alcohol/week: 5.0 standard drinks of alcohol     Types: 5 Drinks containing 0.5 oz of alcohol per week     Comment: 3/4 x a week    Drug use: Never    Sexual activity: Yes     Partners: Male     Birth control/protection: None     Social Determinants of Health     Financial Resource Strain: Low Risk  (2024)    Overall Financial Resource Strain (CARDIA)     Difficulty of Paying Living Expenses: Not hard at all   Food Insecurity: No Food Insecurity (2024)    Hunger Vital Sign     Worried About Running Out of Food in the Last Year: Never true     Ran Out of Food in the Last Year: Never true   Transportation Needs: No Transportation Needs (2024)    PRAPARE - Transportation     Lack of Transportation (Medical): No     Lack of Transportation (Non-Medical): No   Physical Activity: Insufficiently Active (2024)    Exercise Vital Sign     Days of Exercise per Week: 2 days     Minutes of Exercise per Session: 10 min   Stress: No Stress Concern Present (2024)    [employer] - Occupational Stress Questionnaire     Feeling of Stress : Not at all   Housing Stability: High Risk (2024)    Housing Stability Vital Sign     Unable to Pay for Housing in the Last Year: Yes     Number of Places Lived in the Last Year: 1     Unstable Housing in the Last Year: Yes       REVIEW OF SYSTEMS:  Constitution: Negative. Negative for chills, fever and night sweats.   Cardiovascular: Negative for chest pain and syncope.   Respiratory: Negative for cough and shortness of breath.   Gastrointestinal: See HPI. Negative for nausea/vomiting.  "Negative for abdominal pain.  Genitourinary: See HPI. Negative for discoloration or dysuria.  Skin: Negative for dry skin, itching and rash.   Hematologic/Lymphatic: Negative for bleeding problem. Does not bruise/bleed easily.   Musculoskeletal: Negative for falls and muscle weakness.   Neurological: See HPI. No seizures.   Endocrine: Negative for polydipsia, polyphagia and polyuria.   Allergic/Immunologic: Negative for hives and persistent infections.    PHYSICAL EXAMINATION:    Ht 5' 2.5" (1.588 m)   Wt 70.3 kg (154 lb 15.7 oz)   BMI 27.90 kg/m²     General: The patient is a 68 y.o. female in no apparent distress, the patient is oriented to person, place and time.   Psych: Normal mood and affect  HEENT:  NCAT, sclera nonicteric  Lungs:  Respirations are equal and unlabored.  CV:  2+ bilateral upper and lower extremity pulses.  Skin:  Intact throughout.  Musculoskeletal: No pain with the range of motion of the bilateral hips. No trochanteric tenderness to palpation. No pain with range of motion about the bilateral knees.      Right Foot and Ankle Exam    INSPECTION:        Gait:    Antalgic gait favoring right side   Scars:   None   Swelling:  Mild swelling to anterior, lateral and posterior ankle   Color:   Normal   Atrophy:  None  Heel / Toe Walking: Pain with toe walking    ALIGNMENT:    Hindfoot  Normal    Midfoot: Normal  Forefoot: Normal     Collective Ankle-Hindfoot Alignment    Good -plantigrade (PG), well aligned         TENDERNESS:  LATERAL:      Sinus tarsi:  None    Syndesmosis:  none    ATFL:   TTP     CFL:   Minimally TTP    Anterolateral gutter: none    Fibula:   none  Peroneal tendons: none    Peroneal tubercle.  None     ANTERIOR:  Anteromedial joint line:  none  Anterolateral joint line:  None  Talonavicular:    None  Anterior tibialis:   none  Extensor tendons:   none    POSTERIOR:  Medial/lateral achilles:   none  Medial/lateral achilles " "insertion: None    MEDIAL:      Deltoid:  TTP    Malleolus:  none    PTT:   none    Navicular:  none           CALCANEUS:  Retrocalcaneal:   none  Medial achilles:   None  Lateral achilles:   None  Calcaneal tuberosity:   None    FOOT:    Calcaneal cuboid  none   MT / MT heads:  none   Navicular   none    Medial cord origin PF:  none  Cuneiforms:   none    Web space:   none  Lisfranc    none    Tarsal tunnel:   TTP  Base of the fifth metatarsal  none   Tinels sign   neg        RANGE OF MOTION:  RIGHT/ LEFT      Ankle DF/PF:  15/45  15/45         Eversion/Inversion: 15/25* 15/25     Midfoot ABD/ADD: 10/10 10/10     First MTP DF/PF: 60/25 60/25              (* = pain)                  STRENGTH: (affected)  Anterior tibialis: 5/5  Posterior tibialis: 5/5*  Gastroc-soleus: 5/5  Peroneals:  5/5  EHL:   5/5  FHL:   5/5    (* = pain)    SPECIAL TESTS:   ANKLE INSTABILITY: (*pain)    Anterior drawer:   Normal      (C-W contralateral side)     Inversion:   30°     Eversion  10°            Collective Instability: (Ant-post and varus-valgus)     Stable        PROVOCATIVE TESTING:    Forced DF/ER: No pain at syndesmosis.    Mid-leg squeeze  No pain at syndesmosis    Forced DF:  No pain anterior joint line.      Forced PF:  No pain posterior ankle.     Forced INV:  No pain lateral    Forced EV:  No pain medial     Keyess sign: Normal ankle plantar flexion.     Resisted peroneal No subluxation or pain    1st-2nd MT toggle No pain at Lisfranc    MT-T torque  No pain at Lisfranc     NEUROLOGIC TESTING:  All dermatomes foot, ankle and leg have normal sensation light touch  Ankle Reflexes 2+, symmetric   Negative Babinski and No Clonus    VASCULAR:  2+ pulses PT/DT with brisk capillary refill toes.        IMAGING:     Radiographs of the R ankle were ordered and personally reviewed with the patient today.No acute fracture or dislocation. No osseous abnormality.        ASSESSMENT/PLAN:    Lula eD Leon" was seen today " for pain.    Diagnoses and all orders for this visit:    Sprain of anterior talofibular ligament of right ankle, initial encounter  -     X-Ray Ankle Complete Right; Future        68 y.o. yo female with a right ATFL sprain after rolling her ankle two weeks ago.    Plan: The patient and I had a thorough discussion today.  We discussed the working diagnosis as well as several other potential alternative diagnoses.  Treatment options were discussed, both conservative and surgical.  Conservative treatment options would include things such as activity modifications, a period of rest, tylenol, anti-inflammatory medications, physical therapy, immobilization, corticosteroid injections, and others.     At this time, the patient would like to proceed with a functional ankle brace. Continue tylenol/NSAIDs as well as ice and elevation.     Return to clinic in 4 weeks.    I have personally taken the history and examined this patient and agree with the residents note as stated above.       Home med: Aspirin 81mg qd, amio 200mg qd, metoprolol XL 50mg qd.   [id number] 2. s/p Watchman device, not on Eliquis. NSR this admission.    PLAN:  - Restart ASA  - c/w other home meds.

## 2024-05-28 DIAGNOSIS — E78.5 HYPERLIPIDEMIA, UNSPECIFIED HYPERLIPIDEMIA TYPE: ICD-10-CM

## 2024-05-28 DIAGNOSIS — M06.9 RHEUMATOID ARTHRITIS INVOLVING BOTH HANDS, UNSPECIFIED WHETHER RHEUMATOID FACTOR PRESENT: ICD-10-CM

## 2024-05-28 RX ORDER — ATORVASTATIN CALCIUM 20 MG/1
20 TABLET, FILM COATED ORAL DAILY
Qty: 90 TABLET | Refills: 3 | Status: SHIPPED | OUTPATIENT
Start: 2024-05-28

## 2024-06-07 ENCOUNTER — LAB VISIT (OUTPATIENT)
Dept: LAB | Facility: HOSPITAL | Age: 69
End: 2024-06-07
Attending: INTERNAL MEDICINE
Payer: MEDICARE

## 2024-06-07 DIAGNOSIS — E83.52 HIGH CALCIUM LEVELS: ICD-10-CM

## 2024-06-07 DIAGNOSIS — Z00.00 HEALTHCARE MAINTENANCE: ICD-10-CM

## 2024-06-07 LAB
CA-I BLDV-SCNC: 1.23 MMOL/L (ref 1.06–1.42)
CALCIUM SERPL-MCNC: 10.6 MG/DL (ref 8.7–10.5)

## 2024-06-07 PROCEDURE — 82310 ASSAY OF CALCIUM: CPT | Performed by: INTERNAL MEDICINE

## 2024-06-07 PROCEDURE — 36415 COLL VENOUS BLD VENIPUNCTURE: CPT | Mod: PN | Performed by: INTERNAL MEDICINE

## 2024-06-07 PROCEDURE — 82330 ASSAY OF CALCIUM: CPT | Performed by: INTERNAL MEDICINE

## 2024-06-21 ENCOUNTER — PATIENT OUTREACH (OUTPATIENT)
Dept: ADMINISTRATIVE | Facility: HOSPITAL | Age: 69
End: 2024-06-21
Payer: MEDICARE

## 2024-06-21 DIAGNOSIS — Z12.31 BREAST CANCER SCREENING BY MAMMOGRAM: Primary | ICD-10-CM

## 2024-06-24 ENCOUNTER — PATIENT MESSAGE (OUTPATIENT)
Dept: FAMILY MEDICINE | Facility: CLINIC | Age: 69
End: 2024-06-24
Payer: MEDICARE

## 2024-06-24 DIAGNOSIS — Z79.899 IMMUNODEFICIENCY DUE TO TREATMENT WITH IMMUNOSUPPRESSIVE MEDICATION: ICD-10-CM

## 2024-06-24 DIAGNOSIS — M06.9 RHEUMATOID ARTHRITIS INVOLVING BOTH HANDS, UNSPECIFIED WHETHER RHEUMATOID FACTOR PRESENT: ICD-10-CM

## 2024-06-24 DIAGNOSIS — D84.821 IMMUNODEFICIENCY DUE TO TREATMENT WITH IMMUNOSUPPRESSIVE MEDICATION: ICD-10-CM

## 2024-06-24 DIAGNOSIS — K21.9 GASTROESOPHAGEAL REFLUX DISEASE, UNSPECIFIED WHETHER ESOPHAGITIS PRESENT: ICD-10-CM

## 2024-06-24 NOTE — TELEPHONE ENCOUNTER
No care due was identified.  [facility] Embedded Care Due Messages. Reference number: [id number].   6/24/2024 1:41:23 PM CDT

## 2024-06-25 RX ORDER — PANTOPRAZOLE SODIUM 40 MG/1
40 TABLET, DELAYED RELEASE ORAL DAILY
Qty: 90 TABLET | Refills: 0 | Status: SHIPPED | OUTPATIENT
Start: 2024-06-25

## 2024-06-25 RX ORDER — FOLIC ACID 1 MG/1
1 TABLET ORAL DAILY
Qty: 90 TABLET | Refills: 0 | Status: SHIPPED | OUTPATIENT
Start: 2024-06-25

## 2024-07-01 ENCOUNTER — OFFICE VISIT (OUTPATIENT)
Dept: ORTHOPEDICS | Facility: CLINIC | Age: 69
End: 2024-07-01
Attending: ORTHOPAEDIC SURGERY
Payer: MEDICARE

## 2024-07-01 VITALS — BODY MASS INDEX: 27.46 KG/M2 | WEIGHT: 155 LBS | HEIGHT: 63 IN

## 2024-07-01 DIAGNOSIS — M17.11 PRIMARY OSTEOARTHRITIS OF RIGHT KNEE: Primary | ICD-10-CM

## 2024-07-01 DIAGNOSIS — M17.12 PRIMARY OSTEOARTHRITIS OF LEFT KNEE: ICD-10-CM

## 2024-07-01 DIAGNOSIS — M25.562 LEFT KNEE PAIN, UNSPECIFIED CHRONICITY: Primary | ICD-10-CM

## 2024-07-01 DIAGNOSIS — M70.50 PES ANSERINE BURSITIS: ICD-10-CM

## 2024-07-01 PROCEDURE — 99999 PR PBB SHADOW E&M-EST. PATIENT-LVL III: CPT | Mod: PBBFAC,,, | Performed by: ORTHOPAEDIC SURGERY

## 2024-07-01 RX ORDER — TRIAMCINOLONE ACETONIDE 40 MG/ML
40 INJECTION, SUSPENSION INTRA-ARTICULAR; INTRAMUSCULAR
Status: DISCONTINUED | OUTPATIENT
Start: 2024-07-01 | End: 2024-07-01 | Stop reason: HOSPADM

## 2024-07-01 RX ADMIN — TRIAMCINOLONE ACETONIDE 40 MG: 40 INJECTION, SUSPENSION INTRA-ARTICULAR; INTRAMUSCULAR at 11:07

## 2024-07-01 NOTE — PROGRESS NOTES
EST PATIENT ORTHOPAEDIC: Knee    PRIMARY CARE PHYSICIAN: Sylvain Haywood MD   REFERRING PROVIDER: Eamon Wong MD  [address] [place] [address]  TANYA Renteria [id number]     ASSESSMENT & PLAN:    Impression:  Right Knee Moderate osteoarthritis, primary  Left Knee Mild Osteoarthritis  Rheumatoid Arthritis  Bilateral Knee Pes bursitis     Follow Up Plan: PRN     Non operative care:    Lula De Leon has physical exam evidence of above and wishes to pursue an non-operative care. I am recommending the following: Injection    To review:  Patient comes in with a 4 month history of having atraumatic onset but acute right knee pain.  She associates this with misstep aching and having a pop in her knee that caused her to drop to the ground.  This pain was primarily medial and posterior based.  She was better, able to ambulate, able to bend her knee.  Has not had recurrent mechanical symptoms in her knee.  She has been treating with ice and a muscle relaxer but no other medications or interventions.  She has radiographs today that demonstrate some moderate medial compartment arthritis and I think she has a associated degenerative meniscal pathology.  She does have medial joint line pain and a positive Ismael's test.  Overall she is walking without use of assistive devices. I do not think an MRI at this time would be helpful at this time.  I put her on anti-inflammatory medications. This has failed to provide lasting relief, so I did an injection.  This provided her great relief for a few months then she sustained a right ankle sprain.  Since that period of time she feels she has been putting more pressure on her knee and she was beginning to have intermittent pains in it.  For the last week she has been given to experience left knee pain.  It is in his similar distribution located on the medial aspect of her left knee.  No injury or trauma or fall.  She does not report mechanical symptoms into this knee.  She is inquiring into bilateral  knee injections.  I think this is reasonable.  If she has ongoing pain despite this I really think my next move would be MRI to further evaluate her knees to ensure no alternative pathology.  May have to consider viscosupplementation injections.      The patient has been ordered:  Injection    CONSULTS:   None    ACTIVE PROBLEM LIST  Patient Active Problem List   Diagnosis    Elevated blood pressure reading    Rheumatoid arthritis involving both hands    Encounter for screening for malignant neoplasm of breast    Gastroesophageal reflux disease    Hyperlipidemia    Aortic atherosclerosis           SUBJECTIVE    CHIEF COMPLAINT: Knee Pain    HPI:   Lula De Leon is a 68 y.o. female here for evaluation and management of right knee pain. There is a specific incident that brought about this pain. she has had progressive problems with the knee(s) starting 1 weeks ago but is now progressing to interfere with activities which include: walking, functional household ADL's, rising from a sitting position, and climbing stairs     Currently the pain in the joint is rated at moderate with activity. The pain is constant and is located in the knee, at level of joint line and located medially. The pain is described as aching, sharp, and throbbing. Relieving factors include rest and ice or heat.     There is associated Clicking and Popping.     Lula De Leon has no additional complaints.     3/21/24: Patient here for follow up. Last seen 1 months ago, conservative treatment attempted but with on going pain. No changes in quality or characteristics.    7/1/24:  Patient comes in today now 3-1/2 months from having intra-articular injection into her right knee.  Overall her right knee is doing well and she had very minimal pain after the previous injection.  She just sustained a right ankle sprain and began to have some intermittent exacerbation of her right knee pain.  It has ongoing mechanical symptoms in her knee.  With regard to  her left she has new pain for approximately the last week.  Atraumatic onset.  Medial based.  No mechanical symptoms in his knee.    PROGRESSIVE SYMPTOMS:  Pain worsened by weight bearing  Pain effecting living situation    FUNCTIONAL STATUS:   Climb a flight of stairs or walk up a hill     PREVIOUS TREATMENTS:  Medical: None  Physical Therapy: Activities Modified   Previous Orthopaedic Surgery: None    REVIEW OF SYSTEMS:  PAIN ASSESSMENT:  See HPI.  MUSCULOSKELETAL: See HPI.  OTHER 10 point review of systems is negative except as stated in HPI above    PAST MEDICAL HISTORY   has a past medical history of Basal cell carcinoma of right ear (10/18/2022) and Rheumatoid arthritis.     PAST SURGICAL HISTORY   has a past surgical history that includes Carpal tunnel release (Right); Sinus surgery (2017); Breast surgery; Breast lumpectomy (Right); Eye surgery (early adulthood); Tonsillectomy (as a kid); and Tubal ligation (1993).     FAMILY HISTORY  family history includes Alcohol abuse in her brother; Aneurysm (age of onset: 64) in her mother; Arthritis in her mother; Drug abuse in her brother; Epilepsy in her sister; Heart attack (age of onset: 70) in her father; Hypertension in her father and mother; Stroke in her mother.     SOCIAL HISTORY   reports that she quit smoking about 36 years ago. Her smoking use included cigarettes. She started smoking about 42 years ago. She has a 6 pack-year smoking history. She has never used smokeless tobacco. She reports current alcohol use of about 5.0 standard drinks of alcohol per week. She reports that she does not use drugs.     ALLERGIES   Review of patient's allergies indicates:  No Known Allergies     MEDICATIONS  Current Outpatient Medications on File Prior to Visit   Medication Sig Dispense Refill    ascorbic acid, vitamin C, (VITAMIN C) 500 MG tablet Take 500 mg by mouth.      atorvastatin (LIPITOR) 20 MG tablet Take 1 tablet (20 mg total) by mouth once daily. 90 tablet 3     beta carotene 62589 UNIT capsule Take 25,000 Units by mouth.      busPIRone (BUSPAR) 10 MG tablet Take 1 tablet (10 mg total) by mouth 2 (two) times daily. 60 tablet 11    calcium carbonate (OS-ONEIDA) 600 mg calcium (1,500 mg) Tab Take 600 mg by mouth.      cetirizine (ZYRTEC) 10 MG tablet Take 10 mg by mouth.      cyanocobalamin (VITAMIN B-12) 1000 MCG tablet Take 1,000 mcg by mouth.      diclofenac sodium (VOLTAREN) 1 % Gel Apply 2 g topically 4 (four) times daily. 1 Tube 3    richa prim-linoleic-gamolenic ac (PRIMROSE OIL) 1,000 mg Cap Take by mouth.      flaxseed oil Oil 800 mg by Other route.      fluorouraciL (EFUDEX) 5 % cream Apply to specific areas bilateral lower legs 2x/day for 4 weeks and right medial cheek 2x/day x 2 weeks 40 g 1    folic acid (FOLVITE) 1 MG tablet Take 1 tablet (1 mg total) by mouth once daily. 90 tablet 0    iron bisgly,ps-FA-B-C#12-succ 65 mg-65 mg -1,000 mcg (24) Tab Take by mouth.      losartan (COZAAR) 25 MG tablet Take 2 tablets (50 mg total) by mouth once daily. 180 tablet 3    MAGNESIUM CARBONATE ORAL Take 600 mg by mouth.      meloxicam (MOBIC) 15 MG tablet Take 1 tablet (15 mg total) by mouth once daily. 30 tablet 1    methotrexate 2.5 MG Tab Take 2 tablets (5 mg total) by mouth every 7 days. 24 tablet 0    omega-3 fatty acids/fish oil (FISH OIL-OMEGA-3 FATTY ACIDS) 300-1,000 mg capsule Take 2 g by mouth.      ondansetron (ZOFRAN-ODT) 4 MG TbDL Take 1 tablet (4 mg total) by mouth every 8 (eight) hours as needed (nausea). 15 tablet 0    pantoprazole (PROTONIX) 40 MG tablet Take 1 tablet (40 mg total) by mouth once daily. 90 tablet 0    phentermine (ADIPEX-P) 37.5 mg tablet Take 1 tablet (37.5 mg total) by mouth before breakfast. 30 tablet 2    traZODone (DESYREL) 50 MG tablet Take 1 tablet (50 mg total) by mouth nightly as needed for Insomnia. 30 tablet 5    upadacitinib (RINVOQ) 15 mg 24 hr tablet Take 1 tablet (15 mg total) by mouth once daily. 30 tablet 11    vitamin E 400 UNIT  "capsule Take 400 Units by mouth.      zinc gluconate 50 mg tablet Take 50 mg by mouth.       No current facility-administered medications on file prior to visit.          PHYSICAL EXAM   height is 5' 2.5" (1.588 m) and weight is 70.3 kg (154 lb 15.7 oz).   Body mass index is 27.9 kg/m².      All other systems deferred.  GENERAL:  No acute distress  HABITUS: Normal  GAIT: Antalgic  SKIN: Normal     KNEE EXAM:    Bilateral:   Effusion: Small joint effusion  TTP: Yes over Medial Joint Line and pes bursa  Yes crepitus with passive knee ROM  Passive ROM: Extension 0, Flexion 130  No pain with manipulation of patella  Stable to varus/valgus stress. No increased laxity to anterior/posterior drawer testing  positive Ismael's test  No pain with IR/ER rotation of the hip  5/5 strength in knee flexion and extension, ankle plantarflexion and dorsiflexion  Neurovascular Status: Sensation intact to light touch in Sural, Saphenous, SPN, DPN, Tibial nerve distribution  2+ pulse DP/PT, normal capillary refill, foot has normal warmth    DATA:  Diagnostic tests reviewed for today's visit:     4v of the right knee reveal Moderate degenerative changes of the Medial compartment. There is evidence of advanced osteoarthritis changes with Joint space narrowing. The limb is in varus alignment. The patella is tracking midline.    4v of the right knee reveal Mild degenerative changes of the Medial compartment. There is evidence of advanced osteoarthritis changes with Joint space narrowing. The limb is in varus alignment. The patella is tracking midline.  "

## 2024-07-01 NOTE — PROCEDURES
Large Joint Aspiration/Injection: bilateral knee    Date/Time: 7/1/2024 11:40 AM    Performed by: Eamon Wong MD  Authorized by: Eamon Wong MD    Consent Done?:  Yes (Verbal)  Indications:  Arthritis and pain  Prep: patient was prepped and draped in usual sterile fashion      Local anesthesia used?: Yes    Anesthesia:  Local infiltration  Local anesthetic:  Topical anesthetic and lidocaine 1% without epinephrine    Details:  Needle Size:  22 G  Approach:  Anterolateral  Location:  Knee  Laterality:  Bilateral  Site:  Bilateral knee  Medications (Right):  40 mg triamcinolone acetonide 40 mg/mL  Medications (Left):  40 mg triamcinolone acetonide 40 mg/mL  Patient tolerance:  Patient tolerated the procedure well with no immediate complications

## 2024-07-23 ENCOUNTER — TELEPHONE (OUTPATIENT)
Dept: VASCULAR SURGERY | Facility: CLINIC | Age: 69
End: 2024-07-23
Payer: MEDICARE

## 2024-07-23 DIAGNOSIS — Q27.39: Primary | ICD-10-CM

## 2024-07-23 DIAGNOSIS — Z01.89 ROUTINE LAB DRAW: ICD-10-CM

## 2024-07-23 NOTE — TELEPHONE ENCOUNTER
"Spoke with the pt in reference to appt schedule as a new pt on 8/31/24.Pt informed that she was established with Dr Shaw and needs to schedule a f/u with him.Pt verbalizes "He kept ordering test and didn't get back with me.He dropped the ball so I wanted to see someone else". Informed pt that I will rescheduled appt to the next available appt and she verbalizes understanding of information received.  " 04-Apr-2018 21:31

## 2024-08-01 ENCOUNTER — LAB VISIT (OUTPATIENT)
Dept: LAB | Facility: HOSPITAL | Age: 69
End: 2024-08-01
Attending: SURGERY
Payer: MEDICARE

## 2024-08-01 DIAGNOSIS — Z01.89 ROUTINE LAB DRAW: ICD-10-CM

## 2024-08-01 LAB
CREAT SERPL-MCNC: 1.4 MG/DL (ref 0.5–1.4)
EST. GFR  (NO RACE VARIABLE): 41 ML/MIN/1.73 M^2

## 2024-08-01 PROCEDURE — 82565 ASSAY OF CREATININE: CPT | Performed by: SURGERY

## 2024-08-01 PROCEDURE — 36415 COLL VENOUS BLD VENIPUNCTURE: CPT | Performed by: SURGERY

## 2024-08-15 ENCOUNTER — HOSPITAL ENCOUNTER (OUTPATIENT)
Dept: RADIOLOGY | Facility: HOSPITAL | Age: 69
Discharge: HOME | End: 2024-08-15
Attending: SURGERY
Payer: MEDICARE

## 2024-08-15 DIAGNOSIS — Q27.39: ICD-10-CM

## 2024-08-15 PROCEDURE — 70498 CT ANGIOGRAPHY NECK: CPT | Mod: 26,,, | Performed by: RADIOLOGY

## 2024-08-15 PROCEDURE — 70498 CT ANGIOGRAPHY NECK: CPT | Mod: TC

## 2024-08-15 PROCEDURE — 25500020 PHARM REV CODE 255: Performed by: SURGERY

## 2024-08-15 RX ADMIN — IOHEXOL 75 ML: 350 INJECTION, SOLUTION INTRAVENOUS at 07:08

## 2024-08-29 ENCOUNTER — PATIENT MESSAGE (OUTPATIENT)
Dept: RHEUMATOLOGY | Facility: CLINIC | Age: 69
End: 2024-08-29
Payer: MEDICARE

## 2024-09-17 ENCOUNTER — LAB VISIT (OUTPATIENT)
Dept: LAB | Facility: HOSPITAL | Age: 69
End: 2024-09-17
Attending: STUDENT IN AN ORGANIZED HEALTH CARE EDUCATION/TRAINING PROGRAM
Payer: MEDICARE

## 2024-09-17 ENCOUNTER — OFFICE VISIT (OUTPATIENT)
Dept: RHEUMATOLOGY | Facility: CLINIC | Age: 69
End: 2024-09-17
Payer: MEDICARE

## 2024-09-17 VITALS
SYSTOLIC BLOOD PRESSURE: 153 MMHG | WEIGHT: 155.44 LBS | RESPIRATION RATE: 18 BRPM | HEIGHT: 63 IN | DIASTOLIC BLOOD PRESSURE: 79 MMHG | HEART RATE: 69 BPM | OXYGEN SATURATION: 98 % | TEMPERATURE: 98 F | BODY MASS INDEX: 27.54 KG/M2

## 2024-09-17 DIAGNOSIS — M06.9 RHEUMATOID ARTHRITIS INVOLVING BOTH HANDS, UNSPECIFIED WHETHER RHEUMATOID FACTOR PRESENT: Primary | ICD-10-CM

## 2024-09-17 DIAGNOSIS — Z71.89 COUNSELING AND COORDINATION OF CARE: ICD-10-CM

## 2024-09-17 DIAGNOSIS — M06.9 RHEUMATOID ARTHRITIS INVOLVING BOTH HANDS, UNSPECIFIED WHETHER RHEUMATOID FACTOR PRESENT: ICD-10-CM

## 2024-09-17 DIAGNOSIS — M15.0 PRIMARY OSTEOARTHRITIS INVOLVING MULTIPLE JOINTS: ICD-10-CM

## 2024-09-17 DIAGNOSIS — D84.821 IMMUNODEFICIENCY DUE TO TREATMENT WITH IMMUNOSUPPRESSIVE MEDICATION: ICD-10-CM

## 2024-09-17 DIAGNOSIS — Z79.899 IMMUNODEFICIENCY DUE TO TREATMENT WITH IMMUNOSUPPRESSIVE MEDICATION: ICD-10-CM

## 2024-09-17 DIAGNOSIS — K21.9 GASTROESOPHAGEAL REFLUX DISEASE, UNSPECIFIED WHETHER ESOPHAGITIS PRESENT: ICD-10-CM

## 2024-09-17 LAB
ALBUMIN SERPL BCP-MCNC: 4.1 G/DL (ref 3.5–5.2)
ALP SERPL-CCNC: 43 U/L (ref 55–135)
ALT SERPL W/O P-5'-P-CCNC: 34 U/L (ref 10–44)
ANION GAP SERPL CALC-SCNC: 6 MMOL/L (ref 8–16)
AST SERPL-CCNC: 30 U/L (ref 10–40)
BASOPHILS # BLD AUTO: 0.03 K/UL (ref 0–0.2)
BASOPHILS NFR BLD: 0.5 % (ref 0–1.9)
BILIRUB SERPL-MCNC: 0.5 MG/DL (ref 0.1–1)
BUN SERPL-MCNC: 20 MG/DL (ref 8–23)
CALCIUM SERPL-MCNC: 9.7 MG/DL (ref 8.7–10.5)
CHLORIDE SERPL-SCNC: 106 MMOL/L (ref 95–110)
CO2 SERPL-SCNC: 28 MMOL/L (ref 23–29)
CREAT SERPL-MCNC: 0.9 MG/DL (ref 0.5–1.4)
DIFFERENTIAL METHOD BLD: ABNORMAL
EOSINOPHIL # BLD AUTO: 0 K/UL (ref 0–0.5)
EOSINOPHIL NFR BLD: 0.4 % (ref 0–8)
ERYTHROCYTE [DISTWIDTH] IN BLOOD BY AUTOMATED COUNT: 13.9 % (ref 11.5–14.5)
EST. GFR  (NO RACE VARIABLE): >60 ML/MIN/1.73 M^2
GLUCOSE SERPL-MCNC: 103 MG/DL (ref 70–110)
HBV CORE AB SERPL QL IA: NORMAL
HBV SURFACE AB SER-ACNC: <3 MIU/ML
HBV SURFACE AB SER-ACNC: NORMAL M[IU]/ML
HBV SURFACE AG SERPL QL IA: NORMAL
HCT VFR BLD AUTO: 36.4 % (ref 37–48.5)
HCV AB SERPL QL IA: NORMAL
HGB BLD-MCNC: 11.9 G/DL (ref 12–16)
HIV 1+2 AB+HIV1 P24 AG SERPL QL IA: NORMAL
IMM GRANULOCYTES # BLD AUTO: 0.02 K/UL (ref 0–0.04)
IMM GRANULOCYTES NFR BLD AUTO: 0.4 % (ref 0–0.5)
LYMPHOCYTES # BLD AUTO: 2 K/UL (ref 1–4.8)
LYMPHOCYTES NFR BLD: 35.8 % (ref 18–48)
MCH RBC QN AUTO: 33.7 PG (ref 27–31)
MCHC RBC AUTO-ENTMCNC: 32.7 G/DL (ref 32–36)
MCV RBC AUTO: 103 FL (ref 82–98)
MONOCYTES # BLD AUTO: 0.7 K/UL (ref 0.3–1)
MONOCYTES NFR BLD: 12.3 % (ref 4–15)
NEUTROPHILS # BLD AUTO: 2.8 K/UL (ref 1.8–7.7)
NEUTROPHILS NFR BLD: 50.6 % (ref 38–73)
NRBC BLD-RTO: 0 /100 WBC
PLATELET # BLD AUTO: 310 K/UL (ref 150–450)
PMV BLD AUTO: 9.3 FL (ref 9.2–12.9)
POTASSIUM SERPL-SCNC: 4.1 MMOL/L (ref 3.5–5.1)
PROT SERPL-MCNC: 7.4 G/DL (ref 6–8.4)
RBC # BLD AUTO: 3.53 M/UL (ref 4–5.4)
SODIUM SERPL-SCNC: 140 MMOL/L (ref 136–145)
WBC # BLD AUTO: 5.53 K/UL (ref 3.9–12.7)

## 2024-09-17 PROCEDURE — 87340 HEPATITIS B SURFACE AG IA: CPT | Performed by: STUDENT IN AN ORGANIZED HEALTH CARE EDUCATION/TRAINING PROGRAM

## 2024-09-17 PROCEDURE — 86706 HEP B SURFACE ANTIBODY: CPT | Mod: 91 | Performed by: STUDENT IN AN ORGANIZED HEALTH CARE EDUCATION/TRAINING PROGRAM

## 2024-09-17 PROCEDURE — 99999 PR PBB SHADOW E&M-EST. PATIENT-LVL V: CPT | Mod: PBBFAC,,, | Performed by: STUDENT IN AN ORGANIZED HEALTH CARE EDUCATION/TRAINING PROGRAM

## 2024-09-17 PROCEDURE — 36415 COLL VENOUS BLD VENIPUNCTURE: CPT | Performed by: STUDENT IN AN ORGANIZED HEALTH CARE EDUCATION/TRAINING PROGRAM

## 2024-09-17 PROCEDURE — 1101F PT FALLS ASSESS-DOCD LE1/YR: CPT | Mod: CPTII,S$GLB,, | Performed by: STUDENT IN AN ORGANIZED HEALTH CARE EDUCATION/TRAINING PROGRAM

## 2024-09-17 PROCEDURE — 3008F BODY MASS INDEX DOCD: CPT | Mod: CPTII,S$GLB,, | Performed by: STUDENT IN AN ORGANIZED HEALTH CARE EDUCATION/TRAINING PROGRAM

## 2024-09-17 PROCEDURE — 85025 COMPLETE CBC W/AUTO DIFF WBC: CPT | Performed by: STUDENT IN AN ORGANIZED HEALTH CARE EDUCATION/TRAINING PROGRAM

## 2024-09-17 PROCEDURE — 99215 OFFICE O/P EST HI 40 MIN: CPT | Mod: S$GLB,,, | Performed by: STUDENT IN AN ORGANIZED HEALTH CARE EDUCATION/TRAINING PROGRAM

## 2024-09-17 PROCEDURE — 80053 COMPREHEN METABOLIC PANEL: CPT | Performed by: STUDENT IN AN ORGANIZED HEALTH CARE EDUCATION/TRAINING PROGRAM

## 2024-09-17 PROCEDURE — 4010F ACE/ARB THERAPY RXD/TAKEN: CPT | Mod: CPTII,S$GLB,, | Performed by: STUDENT IN AN ORGANIZED HEALTH CARE EDUCATION/TRAINING PROGRAM

## 2024-09-17 PROCEDURE — 86480 TB TEST CELL IMMUN MEASURE: CPT | Performed by: STUDENT IN AN ORGANIZED HEALTH CARE EDUCATION/TRAINING PROGRAM

## 2024-09-17 PROCEDURE — 86704 HEP B CORE ANTIBODY TOTAL: CPT | Performed by: STUDENT IN AN ORGANIZED HEALTH CARE EDUCATION/TRAINING PROGRAM

## 2024-09-17 PROCEDURE — 86803 HEPATITIS C AB TEST: CPT | Performed by: STUDENT IN AN ORGANIZED HEALTH CARE EDUCATION/TRAINING PROGRAM

## 2024-09-17 PROCEDURE — 87389 HIV-1 AG W/HIV-1&-2 AB AG IA: CPT | Performed by: STUDENT IN AN ORGANIZED HEALTH CARE EDUCATION/TRAINING PROGRAM

## 2024-09-17 PROCEDURE — 3044F HG A1C LEVEL LT 7.0%: CPT | Mod: CPTII,S$GLB,, | Performed by: STUDENT IN AN ORGANIZED HEALTH CARE EDUCATION/TRAINING PROGRAM

## 2024-09-17 PROCEDURE — 1126F AMNT PAIN NOTED NONE PRSNT: CPT | Mod: CPTII,S$GLB,, | Performed by: STUDENT IN AN ORGANIZED HEALTH CARE EDUCATION/TRAINING PROGRAM

## 2024-09-17 PROCEDURE — 3078F DIAST BP <80 MM HG: CPT | Mod: CPTII,S$GLB,, | Performed by: STUDENT IN AN ORGANIZED HEALTH CARE EDUCATION/TRAINING PROGRAM

## 2024-09-17 PROCEDURE — 3288F FALL RISK ASSESSMENT DOCD: CPT | Mod: CPTII,S$GLB,, | Performed by: STUDENT IN AN ORGANIZED HEALTH CARE EDUCATION/TRAINING PROGRAM

## 2024-09-17 PROCEDURE — 3077F SYST BP >= 140 MM HG: CPT | Mod: CPTII,S$GLB,, | Performed by: STUDENT IN AN ORGANIZED HEALTH CARE EDUCATION/TRAINING PROGRAM

## 2024-09-17 RX ORDER — FOLIC ACID 1 MG/1
1 TABLET ORAL DAILY
Qty: 90 TABLET | Refills: 0 | Status: SHIPPED | OUTPATIENT
Start: 2024-09-17 | End: 2024-12-17 | Stop reason: SDUPTHER

## 2024-09-17 RX ORDER — METHOTREXATE 2.5 MG/1
5 TABLET ORAL
Qty: 24 TABLET | Refills: 0 | Status: SHIPPED | OUTPATIENT
Start: 2024-09-17

## 2024-09-17 RX ORDER — PANTOPRAZOLE SODIUM 40 MG/1
40 TABLET, DELAYED RELEASE ORAL DAILY
Qty: 90 TABLET | Refills: 0 | Status: SHIPPED | OUTPATIENT
Start: 2024-09-17 | End: 2024-12-17 | Stop reason: SDUPTHER

## 2024-09-18 ENCOUNTER — OFFICE VISIT (OUTPATIENT)
Dept: VASCULAR SURGERY | Facility: CLINIC | Age: 69
End: 2024-09-18
Payer: MEDICARE

## 2024-09-18 VITALS
TEMPERATURE: 98 F | DIASTOLIC BLOOD PRESSURE: 77 MMHG | WEIGHT: 154.31 LBS | SYSTOLIC BLOOD PRESSURE: 150 MMHG | HEART RATE: 66 BPM | HEIGHT: 63 IN | BODY MASS INDEX: 27.34 KG/M2

## 2024-09-18 DIAGNOSIS — I73.9 PERIPHERAL VASCULAR DISEASE: Primary | ICD-10-CM

## 2024-09-18 LAB
GAMMA INTERFERON BACKGROUND BLD IA-ACNC: 0.06 IU/ML
M TB IFN-G CD4+ BCKGRND COR BLD-ACNC: -0.01 IU/ML
M TB IFN-G CD4+ BCKGRND COR BLD-ACNC: -0.01 IU/ML
MITOGEN IGNF BCKGRD COR BLD-ACNC: 7.45 IU/ML
TB GOLD PLUS: NEGATIVE

## 2024-09-18 PROCEDURE — 4010F ACE/ARB THERAPY RXD/TAKEN: CPT | Mod: CPTII,S$GLB,, | Performed by: SURGERY

## 2024-09-18 PROCEDURE — 1101F PT FALLS ASSESS-DOCD LE1/YR: CPT | Mod: CPTII,S$GLB,, | Performed by: SURGERY

## 2024-09-18 PROCEDURE — 99999 PR PBB SHADOW E&M-EST. PATIENT-LVL IV: CPT | Mod: PBBFAC,,, | Performed by: SURGERY

## 2024-09-18 PROCEDURE — 3044F HG A1C LEVEL LT 7.0%: CPT | Mod: CPTII,S$GLB,, | Performed by: SURGERY

## 2024-09-18 PROCEDURE — 3078F DIAST BP <80 MM HG: CPT | Mod: CPTII,S$GLB,, | Performed by: SURGERY

## 2024-09-18 PROCEDURE — 99214 OFFICE O/P EST MOD 30 MIN: CPT | Mod: S$GLB,,, | Performed by: SURGERY

## 2024-09-18 PROCEDURE — 1126F AMNT PAIN NOTED NONE PRSNT: CPT | Mod: CPTII,S$GLB,, | Performed by: SURGERY

## 2024-09-18 PROCEDURE — 3288F FALL RISK ASSESSMENT DOCD: CPT | Mod: CPTII,S$GLB,, | Performed by: SURGERY

## 2024-09-18 PROCEDURE — 3077F SYST BP >= 140 MM HG: CPT | Mod: CPTII,S$GLB,, | Performed by: SURGERY

## 2024-09-18 PROCEDURE — 1159F MED LIST DOCD IN RCRD: CPT | Mod: CPTII,S$GLB,, | Performed by: SURGERY

## 2024-09-18 PROCEDURE — 3008F BODY MASS INDEX DOCD: CPT | Mod: CPTII,S$GLB,, | Performed by: SURGERY

## 2024-09-18 RX ORDER — PHENTERMINE HYDROCHLORIDE 37.5 MG/1
37.5 TABLET ORAL
COMMUNITY
Start: 2024-09-06

## 2024-09-18 NOTE — PROGRESS NOTES
Lula De Leon  09/18/2024    HPI:  Patient is a 68 y.o. female with a h/o prior smoker, BCC of right hear and RA who is here today for evaluation of pulsations in her neck. She saw her PCP about 2 years ago who noticed the pulsations and referred her to Dr Shaw who ordered a CTA neck (8/2024), which indicated slight asymmetry of left main pulmonary artery compared to the right. These findings are stable from prior CTA neck in 6/2023.  Pt denies any associated symptoms, including HA, dizziness, blurry vision, facial pain or sensory changes, chest pain, exertional dyspnea, or palpitations. Denies personal or family history of cardiovascular disease or stroke. Mother had intracranial aneurysm that ruptured resulting in her death. Pt is taking losartan for BP, and is taking lipitor. Denies AP/AC. Pt states she is fairly active. Walks regularly, although RA sometimes limits her ability to walk long distances.    No MI/stroke  Tobacco use: previous smoker, quit about 40 years ago    Sylvain Haywood MD   Employment: Retired, made dentures and prosthesis      Current Outpatient Medications:     ascorbic acid, vitamin C, (VITAMIN C) 500 MG tablet, Take 500 mg by mouth., Disp: , Rfl:     atorvastatin (LIPITOR) 20 MG tablet, Take 1 tablet (20 mg total) by mouth once daily., Disp: 90 tablet, Rfl: 3    beta carotene 08105 UNIT capsule, Take 25,000 Units by mouth., Disp: , Rfl:     busPIRone (BUSPAR) 10 MG tablet, Take 1 tablet (10 mg total) by mouth 2 (two) times daily., Disp: 60 tablet, Rfl: 11    calcium carbonate (OS-ONEIDA) 600 mg calcium (1,500 mg) Tab, Take 600 mg by mouth., Disp: , Rfl:     cetirizine (ZYRTEC) 10 MG tablet, Take 10 mg by mouth., Disp: , Rfl:     cyanocobalamin (VITAMIN B-12) 1000 MCG tablet, Take 1,000 mcg by mouth., Disp: , Rfl:     diclofenac sodium (VOLTAREN) 1 % Gel, Apply 2 g topically 4 (four) times daily., Disp: 1 Tube, Rfl: 3    richa prim-linoleic-gamolenic ac (PRIMROSE OIL) 1,000 mg Cap, Take  by mouth., Disp: , Rfl:     flaxseed oil Oil, 800 mg by Other route., Disp: , Rfl:     fluorouraciL (EFUDEX) 5 % cream, Apply to specific areas bilateral lower legs 2x/day for 4 weeks and right medial cheek 2x/day x 2 weeks, Disp: 40 g, Rfl: 1    folic acid (FOLVITE) 1 MG tablet, Take 1 tablet (1 mg total) by mouth once daily., Disp: 90 tablet, Rfl: 0    iron bisgly,ps-FA-B-C#12-succ 65 mg-65 mg -1,000 mcg (24) Tab, Take by mouth., Disp: , Rfl:     losartan (COZAAR) 25 MG tablet, Take 2 tablets (50 mg total) by mouth once daily., Disp: 180 tablet, Rfl: 3    MAGNESIUM CARBONATE ORAL, Take 600 mg by mouth., Disp: , Rfl:     meloxicam (MOBIC) 15 MG tablet, Take 1 tablet (15 mg total) by mouth once daily., Disp: 30 tablet, Rfl: 1    methotrexate 2.5 MG Tab, Take 2 tablets (5 mg total) by mouth every 7 days., Disp: 24 tablet, Rfl: 0    omega-3 fatty acids/fish oil (FISH OIL-OMEGA-3 FATTY ACIDS) 300-1,000 mg capsule, Take 2 g by mouth., Disp: , Rfl:     ondansetron (ZOFRAN-ODT) 4 MG TbDL, Take 1 tablet (4 mg total) by mouth every 8 (eight) hours as needed (nausea)., Disp: 15 tablet, Rfl: 0    pantoprazole (PROTONIX) 40 MG tablet, Take 1 tablet (40 mg total) by mouth once daily., Disp: 90 tablet, Rfl: 0    phentermine (ADIPEX-P) 37.5 mg tablet, Take 37.5 mg by mouth., Disp: , Rfl:     traZODone (DESYREL) 50 MG tablet, Take 1 tablet (50 mg total) by mouth nightly as needed for Insomnia., Disp: 30 tablet, Rfl: 5    upadacitinib (RINVOQ) 15 mg 24 hr tablet, Take 1 tablet (15 mg total) by mouth once daily., Disp: 30 tablet, Rfl: 11    vitamin E 400 UNIT capsule, Take 400 Units by mouth., Disp: , Rfl:     zinc gluconate 50 mg tablet, Take 50 mg by mouth., Disp: , Rfl:     PHYSICAL EXAM:   Right Arm BP - Sittin/74 (24 1035)  Left Arm BP - Sittin/77 (24 1035)  Pulse: 66  Temp: 97.9 °F (36.6 °C)     Neurological: Normal speech, no focal findings noted; CN II - XII grossly intact                  Neck: Supple, no significant adenopathy; thyroid is not enlarged                  No carotid bruit can be auscultated          Abdomen: Soft, nontender, nondistended, no masses or organomegaly     No rebound tenderness noted; bowel sounds normal     Pulsatile aortic mass is not palpable.     No groin adenopathy      Extremities:  Distal pulses palpable bilaterally     No ulcerations    LAB RESULTS:  Lab Results   Component Value Date    K 4.1 09/17/2024    K 4.5 05/03/2024    K 4.7 04/22/2024    CREATININE 0.9 09/17/2024    CREATININE 1.4 08/01/2024    CREATININE 1.0 05/03/2024     Lab Results   Component Value Date    WBC 5.53 09/17/2024    WBC 4.14 05/03/2024    WBC 6.77 04/22/2024    HCT 36.4 (L) 09/17/2024    HCT 37.4 05/03/2024    HCT 37.4 04/22/2024     09/17/2024     05/03/2024     04/22/2024     Lab Results   Component Value Date    HGBA1C 5.3 05/03/2024    HGBA1C 5.1 05/22/2023    HGBA1C 4.8 05/24/2022     IMAGING (I have independently reviewed and interpreted the following tests)  CTA Neck:  Prominent innominate to R CCA due to superficial and tortuous nature    Mild prominence of the right brachiocephalic artery similar to prior without evidence for aneurysmal dilatation  There is mild prominence of the left main pulmonary artery similar to prior measuring 2.4 cm in diameter clinical correlation advised..    IMP/PLAN:     68 y.o. female with a h/o prior smoker, BCC of right hear and RA - re-assured she merely has a prominent innominate to R CCA due to superficial and tortuous nature  No need for intervention and does not need surveillance  Small calcification R ICA  ASA 81 po QD  F/u PRN       Devon Monson MD DFSVS FACS   Vascular/Endovascular Surgery    Time spent personally reviewing the patient's chart, interpreting images and test, and conferring with physicians was HBtimespent2: 45 mins.

## 2024-09-27 ENCOUNTER — PATIENT MESSAGE (OUTPATIENT)
Dept: RHEUMATOLOGY | Facility: CLINIC | Age: 69
End: 2024-09-27
Payer: MEDICARE

## 2024-10-15 ENCOUNTER — PATIENT MESSAGE (OUTPATIENT)
Dept: ADMINISTRATIVE | Facility: HOSPITAL | Age: 69
End: 2024-10-15
Payer: MEDICARE

## 2024-11-07 DIAGNOSIS — G47.00 INSOMNIA, UNSPECIFIED TYPE: ICD-10-CM

## 2024-11-07 RX ORDER — TRAZODONE HYDROCHLORIDE 50 MG/1
50 TABLET ORAL NIGHTLY PRN
Qty: 90 TABLET | Refills: 1 | Status: SHIPPED | OUTPATIENT
Start: 2024-11-07

## 2024-11-07 NOTE — TELEPHONE ENCOUNTER
Refill Decision Note   Lula De Leon  is requesting a refill authorization.    Brief Assessment and Rationale for Refill:   Approve       Medication Therapy Plan:         Comments:     Note composed:3:38 PM 11/07/2024

## 2024-11-11 DIAGNOSIS — F41.9 ANXIETY: ICD-10-CM

## 2024-11-11 NOTE — TELEPHONE ENCOUNTER
No care due was identified.  [facility] Embedded Care Due Messages. Reference number: [id number].   11/11/2024 1:44:56 PM CST

## 2024-11-11 NOTE — TELEPHONE ENCOUNTER
Refill Routing Note   Medication(s) are not appropriate for processing by [facility] for the following reason(s):        Outside of protocol    [facility] action(s):  Route               Appointments  past 12m or future 3m with PCP    Date Provider   Last Visit   5/7/2024 Sylvain Haywood MD   Next Visit   Visit date not found Sylvain Haywood MD   ED visits in past 90 days: 0        Note composed:4:52 PM 11/11/2024

## 2024-11-12 RX ORDER — BUSPIRONE HYDROCHLORIDE 10 MG/1
10 TABLET ORAL 2 TIMES DAILY
Qty: 60 TABLET | Refills: 6 | Status: SHIPPED | OUTPATIENT
Start: 2024-11-12 | End: 2025-06-10

## 2024-11-14 NOTE — TELEPHONE ENCOUNTER
No care due was identified.  [facility] Embedded Care Due Messages. Reference number: [id number].   11/07/2024 11:26:49 AM CST   641

## 2024-12-03 DIAGNOSIS — I10 ESSENTIAL HYPERTENSION: ICD-10-CM

## 2024-12-03 RX ORDER — LOSARTAN POTASSIUM 25 MG/1
50 TABLET ORAL DAILY
Qty: 180 TABLET | Refills: 3 | Status: CANCELLED | OUTPATIENT
Start: 2024-12-03 | End: 2025-11-28

## 2024-12-03 NOTE — TELEPHONE ENCOUNTER
No care due was identified.  [facility] Embedded Care Due Messages. Reference number: [id number].   12/03/2024 3:01:57 PM CST

## 2024-12-04 RX ORDER — LOSARTAN POTASSIUM 25 MG/1
50 TABLET ORAL DAILY
Qty: 180 TABLET | Refills: 3 | Status: SHIPPED | OUTPATIENT
Start: 2024-12-04 | End: 2025-11-29

## 2024-12-17 DIAGNOSIS — D84.821 IMMUNODEFICIENCY DUE TO TREATMENT WITH IMMUNOSUPPRESSIVE MEDICATION: ICD-10-CM

## 2024-12-17 DIAGNOSIS — M06.9 RHEUMATOID ARTHRITIS INVOLVING BOTH HANDS, UNSPECIFIED WHETHER RHEUMATOID FACTOR PRESENT: ICD-10-CM

## 2024-12-17 DIAGNOSIS — K21.9 GASTROESOPHAGEAL REFLUX DISEASE, UNSPECIFIED WHETHER ESOPHAGITIS PRESENT: ICD-10-CM

## 2024-12-17 DIAGNOSIS — Z79.899 IMMUNODEFICIENCY DUE TO TREATMENT WITH IMMUNOSUPPRESSIVE MEDICATION: ICD-10-CM

## 2024-12-17 RX ORDER — PANTOPRAZOLE SODIUM 40 MG/1
40 TABLET, DELAYED RELEASE ORAL DAILY
Qty: 90 TABLET | Refills: 0 | Status: SHIPPED | OUTPATIENT
Start: 2024-12-17

## 2024-12-17 RX ORDER — FOLIC ACID 1 MG/1
1 TABLET ORAL DAILY
Qty: 90 TABLET | Refills: 0 | Status: SHIPPED | OUTPATIENT
Start: 2024-12-17

## 2024-12-30 NOTE — PROGRESS NOTES
RHEUMATOLOGY OUTPATIENT CLINIC NOTE    12/30/2024    Attending Rheumatologist: Nate Daniels  Primary Care Provider: Sylvain Haywood MD   Physician Requesting Consultation: No referring provider defined for this encounter.  Chief Complaint/Reason For Consultation:  Rheumatoid Arthritis        Subjective:       HPI  Lula De Leon is a 69 y.o. White female with Hx of Seropositive RA who comes to establish care.     She is new to me. Used to follow up with Dr. Hooks     Patient diagnosed in 2017 . Reports it started in the wrist as swelling and pain, could barely move it. Then was seen by a Rheumatologist which placed her on therapy eventually escalating to Triple Therapy (SSZ, MTX, HCQ) for control. Was on MTX 8 tabs, SSZ 4 and decreased to MTX 4 and SSZ 2 in August due to Renal Function.   Patient reports good response on current therapy. Denies joint swelling, tenderness. Reports about 10-15 of morning stiffness. Reports also lower back and knee pain. Most typically with use. Was prior on NSAIDs but stopped, now on Tylenol with good relief.   FHx: Possible Mom, and Aunt with RA     LV  -Last visit on 10/2023 with Dr. Hooks   -She is doing well now. She tried to tapered off methotrexate after last visit but noted worsening joint pain so she resumed methotrexate at 5 mg PO QW (2 tabs) and daily folic acid. She continues to take Rinvoq daily. Denies any side effects. No recurrent infections.   -She is off HCQ, SSZ.   -Labs from 10/2023 showed normal CBC and CMP    9/17/24: Patient here for follow up of RA   She is establishing care with the clinic- used to follow Dr. Hooks  Last appt 2/2024  - Patient is off HCQ and SSZ   - Last labs 9/17 and infectious screening normal     Review of Systems   Constitutional:  Negative for appetite change, chills, fatigue, fever and unexpected weight change.   HENT:  Negative for nasal congestion, ear discharge, ear pain, hearing loss, mouth sores, nosebleeds, sneezing, sore  throat, tinnitus and trouble swallowing.    Eyes:  Negative for photophobia, pain, discharge, redness, itching and visual disturbance.   Respiratory:  Negative for cough, chest tightness, shortness of breath and wheezing.    Cardiovascular:  Negative for chest pain, palpitations and leg swelling.   Gastrointestinal:  Negative for abdominal distention, abdominal pain, blood in stool, constipation, diarrhea, nausea and vomiting.   Endocrine: Negative for cold intolerance, heat intolerance, polydipsia, polyphagia and polyuria.   Genitourinary:  Negative for difficulty urinating, dyspareunia, dysuria, flank pain, frequency, genital sores, hematuria, menstrual problem, pelvic pain, urgency, vaginal bleeding, vaginal discharge, vaginal pain and vaginal dryness.   Musculoskeletal:  Negative for arthralgias, back pain, gait problem, joint swelling, leg pain, myalgias, neck pain, neck stiffness and joint deformity.   Integumentary:  Negative for pallor and rash.   Neurological:  Negative for dizziness, seizures, weakness, light-headedness, numbness and headaches.   Hematological:  Negative for adenopathy. Does not bruise/bleed easily.   Psychiatric/Behavioral:  Negative for confusion, decreased concentration and sleep disturbance. The patient is not nervous/anxious.    All other systems reviewed and are negative.         Chronic comorbid conditions affecting medical decision making today:  Past Medical History:   Diagnosis Date    Basal cell carcinoma of right ear 10/18/2022    Rheumatoid arthritis      Past Surgical History:   Procedure Laterality Date    BREAST LUMPECTOMY Right     exc biopsy    BREAST SURGERY      lobectomy 2017 - [facility]    CARPAL TUNNEL RELEASE Right     EYE SURGERY  early adulthood    SINUS SURGERY  2017    Dr. Christel FLORES and Dr. Chilel    TONSILLECTOMY  as a kid    TUBAL LIGATION       Family History   Problem Relation Name Age of Onset    Arthritis Mother martha              Aneurysm Mother martha 64    Hypertension Mother martha             Stroke Mother martha             Heart attack Father houston SR 70    Hypertension Father houston NIEVES             Epilepsy Sister      Alcohol abuse Brother houston vaughan             Drug abuse Brother houston vaughan              Social History     Substance and Sexual Activity   Alcohol Use Yes    Alcohol/week: 5.0 standard drinks of alcohol    Types: 5 Drinks containing 0.5 oz of alcohol per week    Comment: 3/4 x a week     Social History     Tobacco Use   Smoking Status Former    Current packs/day: 0.00    Average packs/day: 1 pack/day for 6.0 years (6.0 ttl pk-yrs)    Types: Cigarettes    Start date:     Quit date:     Years since quittin.0   Smokeless Tobacco Never   Tobacco Comments    quit when first got pregnant     Social History     Substance and Sexual Activity   Drug Use Never       Current Outpatient Medications:     ascorbic acid, vitamin C, (VITAMIN C) 500 MG tablet, Take 500 mg by mouth., Disp: , Rfl:     atorvastatin (LIPITOR) 20 MG tablet, Take 1 tablet (20 mg total) by mouth once daily., Disp: 90 tablet, Rfl: 3    beta carotene 82443 UNIT capsule, Take 25,000 Units by mouth., Disp: , Rfl:     calcium carbonate (OS-ONEIDA) 600 mg calcium (1,500 mg) Tab, Take 600 mg by mouth., Disp: , Rfl:     cetirizine (ZYRTEC) 10 MG tablet, Take 10 mg by mouth., Disp: , Rfl:     cyanocobalamin (VITAMIN B-12) 1000 MCG tablet, Take 1,000 mcg by mouth., Disp: , Rfl:     diclofenac sodium (VOLTAREN) 1 % Gel, Apply 2 g topically 4 (four) times daily., Disp: 1 Tube, Rfl: 3    richa prim-linoleic-gamolenic ac (PRIMROSE OIL) 1,000 mg Cap, Take by mouth., Disp: , Rfl:     flaxseed oil Oil, 800 mg by Other route., Disp: , Rfl:     fluorouraciL (EFUDEX) 5 % cream, Apply to specific areas bilateral lower legs 2x/day for 4 weeks and right medial cheek 2x/day x 2 weeks, Disp: 40 g, Rfl: 1    iron  mariloups-FA-B-C#12-succ 65 mg-65 mg -1,000 mcg (24) Tab, Take by mouth., Disp: , Rfl:     MAGNESIUM CARBONATE ORAL, Take 600 mg by mouth., Disp: , Rfl:     meloxicam (MOBIC) 15 MG tablet, Take 1 tablet (15 mg total) by mouth once daily., Disp: 30 tablet, Rfl: 1    omega-3 fatty acids/fish oil (FISH OIL-OMEGA-3 FATTY ACIDS) 300-1,000 mg capsule, Take 2 g by mouth., Disp: , Rfl:     ondansetron (ZOFRAN-ODT) 4 MG TbDL, Take 1 tablet (4 mg total) by mouth every 8 (eight) hours as needed (nausea)., Disp: 15 tablet, Rfl: 0    upadacitinib (RINVOQ) 15 mg 24 hr tablet, Take 1 tablet (15 mg total) by mouth once daily., Disp: 30 tablet, Rfl: 11    vitamin E 400 UNIT capsule, Take 400 Units by mouth., Disp: , Rfl:     zinc gluconate 50 mg tablet, Take 50 mg by mouth., Disp: , Rfl:     busPIRone (BUSPAR) 10 MG tablet, Take 1 tablet (10 mg total) by mouth 2 (two) times daily., Disp: 60 tablet, Rfl: 6    folic acid (FOLVITE) 1 MG tablet, Take 1 tablet (1 mg total) by mouth once daily., Disp: 90 tablet, Rfl: 0    losartan (COZAAR) 25 MG tablet, Take 2 tablets (50 mg total) by mouth once daily., Disp: 180 tablet, Rfl: 3    methotrexate 2.5 MG Tab, Take 2 tablets (5 mg total) by mouth every 7 days., Disp: 24 tablet, Rfl: 0    pantoprazole (PROTONIX) 40 MG tablet, Take 1 tablet (40 mg total) by mouth once daily., Disp: 90 tablet, Rfl: 0    phentermine (ADIPEX-P) 37.5 mg tablet, Take 37.5 mg by mouth., Disp: , Rfl:     traZODone (DESYREL) 50 MG tablet, Take 1 tablet (50 mg total) by mouth nightly as needed for Insomnia., Disp: 90 tablet, Rfl: 1     Objective:         Vitals:    09/17/24 1058   BP: (!) 153/79   Pulse: 69   Resp: 18   Temp: 97.8 °F (36.6 °C)       Physical Exam   Constitutional: She is oriented to person, place, and time.   HENT:   Head: Normocephalic.   Mouth/Throat: Oropharynx is clear and moist. Mucous membranes are moist.   Pulmonary/Chest: Effort normal.   Abdominal: Soft.   Musculoskeletal:      Right shoulder:  "Normal.      Left shoulder: Normal.      Right elbow: Normal.      Left elbow: Normal.      Right wrist: Normal.      Left wrist: Normal.      Cervical back: Normal range of motion and neck supple.      Right knee: Normal.      Left knee: Normal.      Comments: Can make fist, no synovitis  Knee crepitus     Neurological: She is alert and oriented to person, place, and time.   Skin: No rash noted. No erythema.   Psychiatric: Mood and affect normal.       Right Side Rheumatological Exam     Examination finds the shoulder, elbow, wrist, knee, 1st PIP, 1st MCP, 2nd PIP, 2nd MCP, 3rd PIP, 3rd MCP, 4th PIP, 4th MCP, 5th PIP and 5th MCP normal.    Knee Exam   Patellofemoral Crepitus: positive    Left Side Rheumatological Exam     Examination finds the shoulder, elbow, wrist, knee, 1st PIP, 1st MCP, 2nd PIP, 2nd MCP, 3rd PIP, 3rd MCP, 4th PIP, 4th MCP, 5th PIP and 5th MCP normal.    Knee Exam     Patellofemoral Crepitus: positive          Reviewed old and all outside pertinent medical records available.    All lab results personally reviewed and interpreted by me.  Lab Results   Component Value Date    WBC 5.53 09/17/2024    HGB 11.9 (L) 09/17/2024    HCT 36.4 (L) 09/17/2024     (H) 09/17/2024    MCH 33.7 (H) 09/17/2024    MCHC 32.7 09/17/2024    RDW 13.9 09/17/2024     09/17/2024    MPV 9.3 09/17/2024       Lab Results   Component Value Date     09/17/2024    K 4.1 09/17/2024     09/17/2024    CO2 28 09/17/2024     09/17/2024    BUN 20 09/17/2024    CALCIUM 9.7 09/17/2024    PROT 7.4 09/17/2024    ALBUMIN 4.1 09/17/2024    BILITOT 0.5 09/17/2024    AST 30 09/17/2024    ALKPHOS 43 (L) 09/17/2024    ALT 34 09/17/2024       No results found for: "COLORU", "APPEARANCEUA", "SPECGRAV", "PHUR", "PHUA", "PROTEINUA", "GLUCOSEU", "KETONESU", "BLOODU", "LEUKOCYTESUR", "NITRITE", "UROBILINOGEN"    Lab Results   Component Value Date    CRP 3.0 11/03/2020       Lab Results   Component Value Date    " SEDRATE 20 11/03/2020       Lab Results   Component Value Date    .0 (H) 11/03/2020    SEDRATE 20 11/03/2020           Imaging:  All imaging reviewed and independently interpreted by me.       ASSESSMENT / PLAN:     Lula De Leon is a 69 y.o. White female with:    1. Rheumatoid arthritis involving both hands, unspecified whether rheumatoid factor present  - Patient with known diagnosis of seropositive RA  - Prev meds: SSZ and HCQ, both tapered off due to control on rinvoq.   - attempted taper methotrexate in 10/2023 but joint pain recurred. Currenly on MTX 2 tabs weekly and symptoms are controlled.   - continue rinvoq daily. Samples provided to patient  (# 10 - Exp date 9/22/2025, LOT [id number])  - labs every 4-6 months   - reassurance  - consider TNF, Xeljanz, IL-6 if worsening symptoms      2. Low Back and Knee Pain likely OA   -continue Tylenol PRN  -Reassurance and Exercise     3. DMARD Toxicity Monitoring   -monitor labs  -Daily FA  -no live vaccines  -vaccines per guidelines. Strongly suggested patient to get shingles vaccine.   -immunosuppression/infectious precautions reinforced     4. Other specified counseling  - over 10 minutes spent regarding below topics:  - Immunization counseling done.  - Nutrition and exercise counseling.  - Limitation of alcohol consumption.  - Regular exercise:  Aerobic and resistance.  - Medication counseling provided.      Follow up in about 6 months (around 3/17/2025).    Method of contact with patient concerns: Des attn Rheumatology    Disclaimer:  This note is prepared using voice recognition software and as such is likely to have errors and has not been proof read. Please contact me for questions.     Time spent: 40 minutes in face to face discussion concerning diagnosis, prognosis, review of lab and test results, benefits of treatment as well as management of disease, counseling of patient and coordination of care between various health care providers.  Greater than  half the time spent was used for coordination of care and counseling of patient.    Nate Daniels M.D.  Rheumatology Department   [facility]

## 2025-01-03 ENCOUNTER — OFFICE VISIT (OUTPATIENT)
Dept: FAMILY MEDICINE | Facility: CLINIC | Age: 70
End: 2025-01-03
Payer: MEDICARE

## 2025-01-03 ENCOUNTER — PATIENT MESSAGE (OUTPATIENT)
Dept: FAMILY MEDICINE | Facility: CLINIC | Age: 70
End: 2025-01-03

## 2025-01-03 VITALS
BODY MASS INDEX: 27.99 KG/M2 | WEIGHT: 152.13 LBS | TEMPERATURE: 98 F | HEIGHT: 62 IN | SYSTOLIC BLOOD PRESSURE: 128 MMHG | OXYGEN SATURATION: 99 % | DIASTOLIC BLOOD PRESSURE: 68 MMHG | RESPIRATION RATE: 15 BRPM | HEART RATE: 76 BPM

## 2025-01-03 DIAGNOSIS — I10 ESSENTIAL HYPERTENSION: ICD-10-CM

## 2025-01-03 DIAGNOSIS — M06.9 RHEUMATOID ARTHRITIS INVOLVING BOTH HANDS, UNSPECIFIED WHETHER RHEUMATOID FACTOR PRESENT: ICD-10-CM

## 2025-01-03 DIAGNOSIS — R19.04 LEFT LOWER QUADRANT ABDOMINAL MASS: Primary | ICD-10-CM

## 2025-01-03 DIAGNOSIS — Z12.31 ENCOUNTER FOR SCREENING MAMMOGRAM FOR MALIGNANT NEOPLASM OF BREAST: ICD-10-CM

## 2025-01-03 PROCEDURE — 99999 PR PBB SHADOW E&M-EST. PATIENT-LVL V: CPT | Mod: PBBFAC,,, | Performed by: NURSE PRACTITIONER

## 2025-01-03 NOTE — PROGRESS NOTES
Routine Office Visit    Patient Name: Lula De Leon    : 1955  MRN: [id number]    Chief Complaint:  Left lower quadrant swelling    Subjective:  Lula is a 69 y.o. female who presents today for:    Left lower quadrant swelling - patient who is new to me with stable RA on Rinvoq and methotrexate reports today for evaluation.  One month ago she picked up one of her grandchildren and then developed some swelling of the left lower quadrant as well as some tenderness of the area.  The tenderness resolved the couple of days later but she still feels a a lump at that point especially when turning a rubbing the area.  She denies any associated abdominal pain, fevers, chills, or stool changes.  Denies any nausea or vomiting or diarrhea.  She is concerned about a potential hernia.  This is the extent of her concerns at this time.  She has hypertension and takes losartan for this.     Past Medical History  Past Medical History:   Diagnosis Date    Basal cell carcinoma of right ear 10/18/2022    Rheumatoid arthritis        Family History  Family History   Problem Relation Name Age of Onset    Arthritis Mother martha             Aneurysm Mother martha 64    Hypertension Mother martha             Stroke Mother martha             Heart attack Father houston SR 70    Hypertension Father h[place] NIEVES             Epilepsy Sister      Alcohol abuse Brother h[place] vaughan             Drug abuse Brother houston vaughan                Current Medications  Current Outpatient Medications on File Prior to Visit   Medication Sig Dispense Refill    ascorbic acid, vitamin C, (VITAMIN C) 500 MG tablet Take 500 mg by mouth.      atorvastatin (LIPITOR) 20 MG tablet Take 1 tablet (20 mg total) by mouth once daily. 90 tablet 3    beta carotene 70857 UNIT capsule Take 25,000 Units by mouth.      busPIRone (BUSPAR) 10 MG tablet Take 1 tablet (10 mg total) by mouth 2 (two) times daily. 60 tablet 6     calcium carbonate (OS-ONEIDA) 600 mg calcium (1,500 mg) Tab Take 600 mg by mouth.      cetirizine (ZYRTEC) 10 MG tablet Take 10 mg by mouth.      cyanocobalamin (VITAMIN B-12) 1000 MCG tablet Take 1,000 mcg by mouth.      diclofenac sodium (VOLTAREN) 1 % Gel Apply 2 g topically 4 (four) times daily. 1 Tube 3    richa prim-linoleic-gamolenic ac (PRIMROSE OIL) 1,000 mg Cap Take by mouth.      flaxseed oil Oil 800 mg by Other route.      fluorouraciL (EFUDEX) 5 % cream Apply to specific areas bilateral lower legs 2x/day for 4 weeks and right medial cheek 2x/day x 2 weeks 40 g 1    folic acid (FOLVITE) 1 MG tablet Take 1 tablet (1 mg total) by mouth once daily. 90 tablet 0    iron bisgly,ps-FA-B-C#12-succ 65 mg-65 mg -1,000 mcg (24) Tab Take by mouth.      losartan (COZAAR) 25 MG tablet Take 2 tablets (50 mg total) by mouth once daily. 180 tablet 3    MAGNESIUM CARBONATE ORAL Take 600 mg by mouth.      meloxicam (MOBIC) 15 MG tablet Take 1 tablet (15 mg total) by mouth once daily. 30 tablet 1    methotrexate 2.5 MG Tab Take 2 tablets (5 mg total) by mouth every 7 days. 24 tablet 0    omega-3 fatty acids/fish oil (FISH OIL-OMEGA-3 FATTY ACIDS) 300-1,000 mg capsule Take 2 g by mouth.      ondansetron (ZOFRAN-ODT) 4 MG TbDL Take 1 tablet (4 mg total) by mouth every 8 (eight) hours as needed (nausea). 15 tablet 0    pantoprazole (PROTONIX) 40 MG tablet Take 1 tablet (40 mg total) by mouth once daily. 90 tablet 0    phentermine (ADIPEX-P) 37.5 mg tablet Take 37.5 mg by mouth.      upadacitinib (RINVOQ) 15 mg 24 hr tablet Take 1 tablet (15 mg total) by mouth once daily. 30 tablet 11    vitamin E 400 UNIT capsule Take 400 Units by mouth.      zinc gluconate 50 mg tablet Take 50 mg by mouth.      [DISCONTINUED] traZODone (DESYREL) 50 MG tablet Take 1 tablet (50 mg total) by mouth nightly as needed for Insomnia. (Patient not taking: Reported on 1/3/2025) 90 tablet 1     No current facility-administered medications on file prior to  "visit.       Allergies   Review of patient's allergies indicates:  No Known Allergies    Review of Systems (Pertinent positives)  Review of Systems   Constitutional: Negative.  Negative for chills and fever.   HENT: Negative.  Negative for congestion, sinus pain and sore throat.    Eyes: Negative.    Respiratory:  Negative for cough, shortness of breath and wheezing.    Cardiovascular:  Negative for chest pain, palpitations, orthopnea and claudication.   Gastrointestinal:  Negative for abdominal pain, constipation, diarrhea, nausea and vomiting.   Genitourinary: Negative.  Negative for dysuria, frequency and urgency.   Musculoskeletal: Negative.  Negative for back pain, joint pain and neck pain.   Skin: Negative.    Neurological: Negative.  Negative for dizziness, tingling, loss of consciousness and headaches.   Endo/Heme/Allergies: Negative.    Psychiatric/Behavioral: Negative.         /68 (BP Location: Right arm, Patient Position: Sitting)   Pulse 76   Temp 98.1 °F (36.7 °C) (Oral)   Resp 15   Ht 5' 2" (1.575 m)   Wt 69 kg (152 lb 1.9 oz)   SpO2 99%   BMI 27.82 kg/m²     Physical Exam  Vitals reviewed.   Constitutional:       General: She is not in acute distress.     Appearance: Normal appearance. She is not ill-appearing, toxic-appearing or diaphoretic.   HENT:      Head: Normocephalic and atraumatic.   Cardiovascular:      Rate and Rhythm: Normal rate and regular rhythm.      Pulses: Normal pulses.      Heart sounds: Normal heart sounds.   Pulmonary:      Effort: Pulmonary effort is normal. No respiratory distress.      Breath sounds: Normal breath sounds. No wheezing.   Abdominal:      General: Bowel sounds are normal. There is no distension.      Palpations: Abdomen is soft.      Tenderness: There is no abdominal tenderness.       Musculoskeletal:         General: No swelling, tenderness or deformity.   Skin:     General: Skin is warm and dry.      Capillary Refill: Capillary refill takes less " "than 2 seconds.   Neurological:      General: No focal deficit present.      Mental Status: She is alert and oriented to person, place, and time.   Psychiatric:         Mood and Affect: Mood normal.         Behavior: Behavior normal.            Assessment/Plan:  Lula De Leon is a 69 y.o. female who presents today for :    Lula "lula De Leon" was seen today for left side pain.    Diagnoses and all orders for this visit:    Left lower quadrant abdominal mass  -     CT Abdomen Pelvis With IV Contrast NO Oral Contrast; Future  -     CREATININE, SERUM; Future    We discussed potential causes.  No obvious hernia.  No obvious findings to suggest strangulation or incarceration.  We discussed potential abdominal CT to evaluate.  Patient agreeable.  Check serum creatinine prior.    Essential hypertension    Controlled on ARB.  Continue.    Encounter for screening mammogram for malignant neoplasm of breast    Will schedule previously ordered mammogram.  We discussed potential colon cancer screening but patient declines colon cancer screening.  Patient declines DEXA as well today.    Rheumatoid arthritis involving both hands, unspecified whether rheumatoid factor present    Doing well on current medications.  Followed by Rheumatology.    >30 minutes spent total on encounter.        This office note has been dictated.  This dictation has been generated using M-Modal Fluency Direct dictation; some phonetic errors may occur.     "

## 2025-01-09 ENCOUNTER — HOSPITAL ENCOUNTER (OUTPATIENT)
Dept: RADIOLOGY | Facility: HOSPITAL | Age: 70
Discharge: HOME | End: 2025-01-09
Attending: NURSE PRACTITIONER
Payer: MEDICARE

## 2025-01-09 DIAGNOSIS — R19.04 LEFT LOWER QUADRANT ABDOMINAL MASS: ICD-10-CM

## 2025-01-09 PROCEDURE — 74177 CT ABD & PELVIS W/CONTRAST: CPT | Mod: TC

## 2025-01-09 PROCEDURE — 25500020 PHARM REV CODE 255: Performed by: NURSE PRACTITIONER

## 2025-01-09 PROCEDURE — 74177 CT ABD & PELVIS W/CONTRAST: CPT | Mod: 26,,, | Performed by: RADIOLOGY

## 2025-01-09 RX ADMIN — IOHEXOL 75 ML: 350 INJECTION, SOLUTION INTRAVENOUS at 10:01

## 2025-02-24 DIAGNOSIS — Z00.00 ENCOUNTER FOR MEDICARE ANNUAL WELLNESS EXAM: ICD-10-CM

## 2025-03-18 DIAGNOSIS — Z79.899 IMMUNODEFICIENCY DUE TO TREATMENT WITH IMMUNOSUPPRESSIVE MEDICATION: ICD-10-CM

## 2025-03-18 DIAGNOSIS — K21.9 GASTROESOPHAGEAL REFLUX DISEASE, UNSPECIFIED WHETHER ESOPHAGITIS PRESENT: ICD-10-CM

## 2025-03-18 DIAGNOSIS — D84.821 IMMUNODEFICIENCY DUE TO TREATMENT WITH IMMUNOSUPPRESSIVE MEDICATION: ICD-10-CM

## 2025-03-18 DIAGNOSIS — M06.9 RHEUMATOID ARTHRITIS INVOLVING BOTH HANDS, UNSPECIFIED WHETHER RHEUMATOID FACTOR PRESENT: ICD-10-CM

## 2025-03-21 RX ORDER — FOLIC ACID 1 MG/1
1 TABLET ORAL DAILY
Qty: 90 TABLET | Refills: 0 | Status: SHIPPED | OUTPATIENT
Start: 2025-03-21

## 2025-03-21 RX ORDER — PANTOPRAZOLE SODIUM 40 MG/1
40 TABLET, DELAYED RELEASE ORAL DAILY
Qty: 90 TABLET | Refills: 0 | Status: SHIPPED | OUTPATIENT
Start: 2025-03-21

## 2025-03-28 DIAGNOSIS — T45.1X5A IMMUNODEFICIENCY DUE TO TREATMENT WITH IMMUNOSUPPRESSIVE MEDICATION: ICD-10-CM

## 2025-03-28 DIAGNOSIS — M06.9 RHEUMATOID ARTHRITIS INVOLVING BOTH HANDS, UNSPECIFIED WHETHER RHEUMATOID FACTOR PRESENT: ICD-10-CM

## 2025-03-28 DIAGNOSIS — D84.821 IMMUNODEFICIENCY DUE TO TREATMENT WITH IMMUNOSUPPRESSIVE MEDICATION: ICD-10-CM

## 2025-03-28 DIAGNOSIS — Z79.60 IMMUNODEFICIENCY DUE TO TREATMENT WITH IMMUNOSUPPRESSIVE MEDICATION: ICD-10-CM

## 2025-03-31 RX ORDER — FOLIC ACID 1 MG/1
1 TABLET ORAL DAILY
Qty: 90 TABLET | Refills: 0 | Status: SHIPPED | OUTPATIENT
Start: 2025-03-31

## 2025-05-12 ENCOUNTER — LAB VISIT (OUTPATIENT)
Dept: LAB | Facility: HOSPITAL | Age: 70
End: 2025-05-12
Attending: INTERNAL MEDICINE
Payer: MEDICARE

## 2025-05-12 ENCOUNTER — OFFICE VISIT (OUTPATIENT)
Dept: FAMILY MEDICINE | Facility: CLINIC | Age: 70
End: 2025-05-12
Payer: MEDICARE

## 2025-05-12 VITALS
BODY MASS INDEX: 28.11 KG/M2 | OXYGEN SATURATION: 98 % | RESPIRATION RATE: 18 BRPM | HEART RATE: 75 BPM | HEIGHT: 62 IN | SYSTOLIC BLOOD PRESSURE: 132 MMHG | WEIGHT: 152.75 LBS | DIASTOLIC BLOOD PRESSURE: 84 MMHG

## 2025-05-12 DIAGNOSIS — Z78.0 ASYMPTOMATIC POSTMENOPAUSAL STATE: ICD-10-CM

## 2025-05-12 DIAGNOSIS — Z00.00 ANNUAL PHYSICAL EXAM: Primary | ICD-10-CM

## 2025-05-12 DIAGNOSIS — Z79.899 OTHER LONG TERM (CURRENT) DRUG THERAPY: ICD-10-CM

## 2025-05-12 DIAGNOSIS — M06.9 RHEUMATOID ARTHRITIS INVOLVING BOTH HANDS, UNSPECIFIED WHETHER RHEUMATOID FACTOR PRESENT: ICD-10-CM

## 2025-05-12 DIAGNOSIS — R79.9 ABNORMAL FINDING OF BLOOD CHEMISTRY, UNSPECIFIED: ICD-10-CM

## 2025-05-12 DIAGNOSIS — I10 ESSENTIAL HYPERTENSION: ICD-10-CM

## 2025-05-12 DIAGNOSIS — Z12.11 COLON CANCER SCREENING: ICD-10-CM

## 2025-05-12 DIAGNOSIS — I70.0 AORTIC ATHEROSCLEROSIS: ICD-10-CM

## 2025-05-12 DIAGNOSIS — Z00.00 HEALTHCARE MAINTENANCE: ICD-10-CM

## 2025-05-12 DIAGNOSIS — E66.3 OVERWEIGHT: ICD-10-CM

## 2025-05-12 DIAGNOSIS — E78.5 HYPERLIPIDEMIA, UNSPECIFIED HYPERLIPIDEMIA TYPE: ICD-10-CM

## 2025-05-12 DIAGNOSIS — Z12.31 BREAST CANCER SCREENING BY MAMMOGRAM: ICD-10-CM

## 2025-05-12 DIAGNOSIS — R11.0 NAUSEA: ICD-10-CM

## 2025-05-12 DIAGNOSIS — F41.9 ANXIETY: ICD-10-CM

## 2025-05-12 DIAGNOSIS — K21.9 GASTROESOPHAGEAL REFLUX DISEASE, UNSPECIFIED WHETHER ESOPHAGITIS PRESENT: ICD-10-CM

## 2025-05-12 LAB
ABSOLUTE EOSINOPHIL (OHS): 0.06 K/UL
ABSOLUTE MONOCYTE (OHS): 0.71 K/UL (ref 0.3–1)
ABSOLUTE NEUTROPHIL COUNT (OHS): 4.34 K/UL (ref 1.8–7.7)
ALBUMIN SERPL BCP-MCNC: 4.1 G/DL (ref 3.5–5.2)
ALP SERPL-CCNC: 54 UNIT/L (ref 40–150)
ALT SERPL W/O P-5'-P-CCNC: 35 UNIT/L (ref 10–44)
ANION GAP (OHS): 12 MMOL/L (ref 8–16)
AST SERPL-CCNC: 28 UNIT/L (ref 11–45)
BASOPHILS # BLD AUTO: 0.02 K/UL
BASOPHILS NFR BLD AUTO: 0.3 %
BILIRUB SERPL-MCNC: 0.5 MG/DL (ref 0.1–1)
BUN SERPL-MCNC: 22 MG/DL (ref 8–23)
CALCIUM SERPL-MCNC: 9.8 MG/DL (ref 8.7–10.5)
CHLORIDE SERPL-SCNC: 104 MMOL/L (ref 95–110)
CHOLEST SERPL-MCNC: 202 MG/DL (ref 120–199)
CHOLEST/HDLC SERPL: 2.9 {RATIO} (ref 2–5)
CO2 SERPL-SCNC: 24 MMOL/L (ref 23–29)
CREAT SERPL-MCNC: 0.8 MG/DL (ref 0.5–1.4)
ERYTHROCYTE [DISTWIDTH] IN BLOOD BY AUTOMATED COUNT: 13.6 % (ref 11.5–14.5)
GFR SERPLBLD CREATININE-BSD FMLA CKD-EPI: >60 ML/MIN/1.73/M2
GLUCOSE SERPL-MCNC: 90 MG/DL (ref 70–110)
HCT VFR BLD AUTO: 39.2 % (ref 37–48.5)
HDLC SERPL-MCNC: 70 MG/DL (ref 40–75)
HDLC SERPL: 34.7 % (ref 20–50)
HGB BLD-MCNC: 12.7 GM/DL (ref 12–16)
IMM GRANULOCYTES # BLD AUTO: 0.02 K/UL (ref 0–0.04)
IMM GRANULOCYTES NFR BLD AUTO: 0.3 % (ref 0–0.5)
LDLC SERPL CALC-MCNC: 111 MG/DL (ref 63–159)
LYMPHOCYTES # BLD AUTO: 1.47 K/UL (ref 1–4.8)
MCH RBC QN AUTO: 33.2 PG (ref 27–31)
MCHC RBC AUTO-ENTMCNC: 32.4 G/DL (ref 32–36)
MCV RBC AUTO: 102 FL (ref 82–98)
NONHDLC SERPL-MCNC: 132 MG/DL
NUCLEATED RBC (/100WBC) (OHS): 0 /100 WBC
PLATELET # BLD AUTO: 311 K/UL (ref 150–450)
PMV BLD AUTO: 9.7 FL (ref 9.2–12.9)
POTASSIUM SERPL-SCNC: 4.2 MMOL/L (ref 3.5–5.1)
PROT SERPL-MCNC: 8 GM/DL (ref 6–8.4)
RBC # BLD AUTO: 3.83 M/UL (ref 4–5.4)
RELATIVE EOSINOPHIL (OHS): 0.9 %
RELATIVE LYMPHOCYTE (OHS): 22.2 % (ref 18–48)
RELATIVE MONOCYTE (OHS): 10.7 % (ref 4–15)
RELATIVE NEUTROPHIL (OHS): 65.6 % (ref 38–73)
SODIUM SERPL-SCNC: 140 MMOL/L (ref 136–145)
TRIGL SERPL-MCNC: 105 MG/DL (ref 30–150)
TSH SERPL-ACNC: 1.16 UIU/ML (ref 0.4–4)
WBC # BLD AUTO: 6.62 K/UL (ref 3.9–12.7)

## 2025-05-12 PROCEDURE — 36415 COLL VENOUS BLD VENIPUNCTURE: CPT | Mod: PN

## 2025-05-12 PROCEDURE — 83036 HEMOGLOBIN GLYCOSYLATED A1C: CPT

## 2025-05-12 PROCEDURE — 84132 ASSAY OF SERUM POTASSIUM: CPT

## 2025-05-12 PROCEDURE — 99999 PR PBB SHADOW E&M-EST. PATIENT-LVL V: CPT | Mod: PBBFAC,,, | Performed by: INTERNAL MEDICINE

## 2025-05-12 PROCEDURE — 85025 COMPLETE CBC W/AUTO DIFF WBC: CPT

## 2025-05-12 PROCEDURE — 84443 ASSAY THYROID STIM HORMONE: CPT

## 2025-05-12 PROCEDURE — 84478 ASSAY OF TRIGLYCERIDES: CPT

## 2025-05-12 PROCEDURE — 82306 VITAMIN D 25 HYDROXY: CPT

## 2025-05-12 RX ORDER — BUSPIRONE HYDROCHLORIDE 10 MG/1
10 TABLET ORAL 2 TIMES DAILY
Qty: 60 TABLET | Refills: 6 | Status: SHIPPED | OUTPATIENT
Start: 2025-05-12 | End: 2025-12-08

## 2025-05-12 RX ORDER — RSV VACC, PREF A AND PREF B/PF 120MCG/0.5
0.5 VIAL (EA) INTRAMUSCULAR ONCE
Qty: 0.5 ML | Refills: 0 | Status: CANCELLED | OUTPATIENT
Start: 2025-05-12 | End: 2025-05-12

## 2025-05-12 RX ORDER — ONDANSETRON 4 MG/1
4 TABLET, ORALLY DISINTEGRATING ORAL EVERY 8 HOURS PRN
Qty: 20 TABLET | Refills: 1 | Status: SHIPPED | OUTPATIENT
Start: 2025-05-12

## 2025-05-12 RX ORDER — PHENTERMINE HYDROCHLORIDE 37.5 MG/1
37.5 TABLET ORAL
Qty: 30 TABLET | Refills: 2 | Status: SHIPPED | OUTPATIENT
Start: 2025-05-12

## 2025-05-12 NOTE — PROGRESS NOTES
HISTORY OF PRESENT ILLNESS:  Lula De Leon is a 69 y.o. female who presents to the clinic today for Annual Exam  .  Last seen by me 5/2024.    Anxiety  Prescribed buspirone and trazodone at last visit.     RA  Stable on Rinvoq, MTX and followed by rheum.  Symptoms improved.     Congenital dilatation brachiocephalic artery  Seen by [facility] surgery.  Advised for daily ASA and no need for intervention or surveillance.     Knee pain  Seen by ortho for osteoarthritis with steroid injection.    She reports having a sinus infection approximately two weeks ago with current ear congestion without pain. Symptoms worsen when lying down but improve with sitting upright. She is using OTC Sudafed (phenylephrine) and Afrin nasal spray for symptom management. Previous trials of Flonase and Nasacort were unsuccessful.    Home blood pressure reading this morning was 138/82. She did not take her blood pressure medication today due to fasting for potential labs.    She sleeps 6-8 hours per night but does not sleep through the night.    She walks two miles almost daily. She takes collagen powder daily mixed with coffee, which she reports has been helpful for her knee pain.    She lives with her  and is a non-smoker.    Current medications include:  - Fluorouracil 5% cream for actinic keratosis and basal cell carcinoma  - Buspirone 10 mg twice daily  - Losartan 50 mg daily (two 25 mg tablets)  - Pantoprazole 40 mg daily  - Atorvastatin 20 mg daily  - Methotrexate 5 mg weekly with folic acid 1 mg daily  - Rinvoq 15 mg daily  - Zofran PRN for nausea from methotrexate  - Phentermine (Adipex) occasionally  - Zyrtec PRN    She takes zinc, vitamin E, omega-3 fatty acid, magnesium, iron, flaxseed oil, primrose oil, B12, calcium carbonate ([place]), beta carotene, and vitamin C.      ROS:  General: -fever, -chills, -fatigue, -weight gain, -weight loss, +sleep disturbances  Eyes: -vision changes, -redness, -discharge  ENT: -ear pain, -nasal  congestion, -sore throat, +nasal discharge, +sinus pressure, +ear pressure  Cardiovascular: -chest pain, -palpitations, -lower extremity edema  Respiratory: -cough, -shortness of breath  Gastrointestinal: -abdominal pain, -nausea, -vomiting, -diarrhea, -constipation, -blood in stool  Genitourinary: -dysuria, -hematuria, -frequency  Musculoskeletal: -joint pain, -muscle pain  Skin: -rash, -lesion  Neurological: -headache, -dizziness, -numbness, -tingling  Psychiatric: -anxiety, -depression, -sleep difficulty             PAST MEDICAL HISTORY:  Past Medical History:   Diagnosis Date    Basal cell carcinoma of right ear 10/18/2022    Rheumatoid arthritis        PAST SURGICAL HISTORY:  Past Surgical History:   Procedure Laterality Date    BREAST LUMPECTOMY Right     exc biopsy    BREAST SURGERY      lobectomy  - [facility]    CARPAL TUNNEL RELEASE Right     EYE SURGERY  early adulthood    SINUS SURGERY      Dr. Christel FLORES and Dr. Chilel    TONSILLECTOMY  as a kid    TUBAL LIGATION         SOCIAL HISTORY:  Social History[1]    FAMILY HISTORY:  Family History   Problem Relation Name Age of Onset    Arthritis Mother martah             Aneurysm Mother martha 64    Hypertension Mother martha             Stroke Mother martha             Heart attack Father houston NIEVES 70    Hypertension Father houston NIEVES             Epilepsy Sister      Alcohol abuse Brother houston vaughan             Drug abuse Brother houston vaughan                ALLERGIES AND MEDICATIONS: updated and reviewed.  Review of patient's allergies indicates:  No Known Allergies  Medication List with Changes/Refills   Current Medications    ASCORBIC ACID, VITAMIN C, (VITAMIN C) 500 MG TABLET    Take 500 mg by mouth.    ATORVASTATIN (LIPITOR) 20 MG TABLET    Take 1 tablet (20 mg total) by mouth once daily.    BETA CAROTENE 73796 UNIT CAPSULE    Take 25,000 Units by mouth.    CALCIUM CARBONATE  (OS-ONEIDA) 600 MG CALCIUM (1,500 MG) TAB    Take 600 mg by mouth.    CETIRIZINE (ZYRTEC) 10 MG TABLET    Take 10 mg by mouth.    CYANOCOBALAMIN (VITAMIN B-12) 1000 MCG TABLET    Take 1,000 mcg by mouth.    DICLOFENAC SODIUM (VOLTAREN) 1 % GEL    Apply 2 g topically 4 (four) times daily.    FAWAD PRIM-LINOLEIC-GAMOLENIC AC (PRIMROSE OIL) 1,000 MG CAP    Take by mouth.    FLAXSEED OIL OIL    800 mg by Other route.    FLUOROURACIL (EFUDEX) 5 % CREAM    Apply to specific areas bilateral lower legs 2x/day for 4 weeks and right medial cheek 2x/day x 2 weeks    FOLIC ACID (FOLVITE) 1 MG TABLET    Take 1 tablet (1 mg total) by mouth once daily.    IRON BISGLY,PS-FA-B-C#12-SUCC 65 MG-65 MG -1,000 MCG (24) TAB    Take by mouth.    LOSARTAN (COZAAR) 25 MG TABLET    Take 2 tablets (50 mg total) by mouth once daily.    MAGNESIUM CARBONATE ORAL    Take 600 mg by mouth.    METHOTREXATE 2.5 MG TAB    Take 2 tablets (5 mg total) by mouth every 7 days.    OMEGA-3 FATTY ACIDS/FISH OIL (FISH OIL-OMEGA-3 FATTY ACIDS) 300-1,000 MG CAPSULE    Take 2 g by mouth.    PANTOPRAZOLE (PROTONIX) 40 MG TABLET    Take 1 tablet (40 mg total) by mouth once daily.    PHENTERMINE (ADIPEX-P) 37.5 MG TABLET    Take 37.5 mg by mouth.    UPADACITINIB (RINVOQ) 15 MG 24 HR TABLET    Take 1 tablet (15 mg total) by mouth once daily.    VITAMIN E 400 UNIT CAPSULE    Take 400 Units by mouth.    ZINC GLUCONATE 50 MG TABLET    Take 50 mg by mouth.   Changed and/or Refilled Medications    Modified Medication Previous Medication    BUSPIRONE (BUSPAR) 10 MG TABLET busPIRone (BUSPAR) 10 MG tablet       Take 1 tablet (10 mg total) by mouth 2 (two) times daily.    Take 1 tablet (10 mg total) by mouth 2 (two) times daily.    ONDANSETRON (ZOFRAN-ODT) 4 MG TBDL ondansetron (ZOFRAN-ODT) 4 MG TbDL       Take 1 tablet (4 mg total) by mouth every 8 (eight) hours as needed (nausea).    Take 1 tablet (4 mg total) by mouth every 8 (eight) hours as needed (nausea).   Discontinued  "Medications    MELOXICAM (MOBIC) 15 MG TABLET    Take 1 tablet (15 mg total) by mouth once daily.          CARE TEAM:  Patient Care Team:  Sylvain Haywood MD as PCP - General (Internal Medicine)  Eliel Rodrigez MD as Consulting Physician (Ophthalmology)  [facility]         PHYSICAL EXAM:   Vitals:    05/12/25 0936   BP: 132/84   Pulse:    Resp:      Weight: 69.3 kg (152 lb 12.5 oz)   Height: 5' 2" (157.5 cm)   Body mass index is 27.94 kg/m².    Physical Exam    Vitals: Blood pressure: 142/72. Blood pressure: 138/82.  General: No acute distress. Well-developed. Well-nourished.  Eyes: EOMI. Sclerae anicteric.  HENT: Normocephalic. Atraumatic. Nares patent. Moist oral mucosa.  Ears: Bilateral TMs clear. Bilateral EACs clear.  Cardiovascular: Regular rate. Regular rhythm. No murmurs. No rubs. No gallops. Normal S1, S2.  Respiratory: Normal respiratory effort. Clear to auscultation bilaterally. No rales. No rhonchi. No wheezing.  Abdomen: Soft. Non-tender. Non-distended. Normoactive bowel sounds.  Musculoskeletal: No  obvious deformity.  Extremities: No lower extremity edema.  Neurological: Alert & oriented x3. No slurred speech. Normal gait.  Psychiatric: Normal mood. Normal affect. Good insight. Good judgment.  Skin: Warm. Dry. No rash.             ASSESSMENT AND PLAN:  IMPRESSION:  - Assessed effectiveness of current medication regimen, including Rinvoq for arthritis management.  - Considered phentermine refill, pending confirmation of normalized BP.  - Evaluated ear congestion, recommending conservative management with antihistamines and starting Astelin nasal spray (OTC).  - Documented declination of recommended health screenings and vaccinations.    Annual physical exam  Healthcare maintenance  -     Hemoglobin A1C; Future; Expected date: 05/12/2025  -     Comprehensive Metabolic Panel; Future; Expected date: 05/12/2025  -     Lipid Panel; Future; Expected date: " 05/12/2025  -     CBC Auto Differential; Future; Expected date: 05/12/2025  -     TSH; Future; Expected date: 05/12/2025  -     Vitamin D; Future; Expected date: 05/12/2025  -     Mammo Digital Screening Bilat w/ [place] (XPD); Future; Expected date: 05/12/2025    Essential hypertension  - Blood pressure measured at 142/72 during visit and 138/82 at home.  Repeat reading was 132/84.  - Advised patient to take BP medication even on days of fasting for labs.  - Cautioned against pseudoephedrine use due to potential BP elevation.  - Prescribed Losartan 25 mg, 2 tablets daily.    Hyperlipidemia, unspecified hyperlipidemia type  Aortic atherosclerosis  -     Hemoglobin A1C; Future; Expected date: 05/12/2025  -     Lipid Panel; Future; Expected date: 05/12/2025  -     CBC Auto Differential; Future; Expected date: 05/12/2025  -     Vitamin D; Future; Expected date: 05/12/2025  - Prescribed Atorvastatin 20 mg daily.    Rheumatoid arthritis involving both hands, unspecified whether rheumatoid factor present  -     Vitamin D; Future; Expected date: 05/12/2025  - Continue Rinvoq 15 mg daily.  - Recommend continued occasional use of Voltaren gel for aches and pains.    Gastroesophageal reflux disease, unspecified whether esophagitis present  - Prescribed Pantoprazole 40 mg daily.    Colon cancer screening  - She decline colon cancer screening.  - Educated patient on risks of bypassing recommended cancer screenings and vaccinations.    Asymptomatic postmenopausal state  -     Vitamin D; Future; Expected date: 05/12/2025    Breast cancer screening by mammogram  -     Mammo Digital Screening Bilat w/ [place] (XPD); Future; Expected date: 05/12/2025    Anxiety  -     TSH; Future; Expected date: 05/12/2025  -     busPIRone (BUSPAR) 10 MG tablet; Take 1 tablet (10 mg total) by mouth 2 (two) times daily.  Dispense: 60 tablet; Refill: 6    Nausea  -     ondansetron (ZOFRAN-ODT) 4 MG TbDL; Take 1 tablet (4 mg total) by mouth every 8 (eight)  hours as needed (nausea).  Dispense: 20 tablet; Refill: 1    Abnormal finding of blood chemistry, unspecified  -     Hemoglobin A1C; Future; Expected date: 2025  -     CBC Auto Differential; Future; Expected date: 2025  -     TSH; Future; Expected date: 2025  -     Vitamin D; Future; Expected date: 2025    Other long term (current) drug therapy  -     Vitamin D; Future; Expected date: 2025    Overweight  -     phentermine (ADIPEX-P) 37.5 mg tablet; Take 1 tablet (37.5 mg total) by mouth before breakfast.  Dispense: 30 tablet; Refill: 2      - Previously took Sudafed for sinus symptoms.  - Explained potential for rebound congestion with prolonged Afrin nasal spray use.  - Initiated Astelin nasal spray (OTC) and prescribed Zyrtec and Afrin for ear congestion.  - No ear pain reported.  - Lula instructed to contact office if ear congestion worsens or does not improve in next 1-2 weeks.      Follow up one year or sooner as needed.    This note was generated with the assistance of ambient listening technology. Verbal consent was obtained by the patient and accompanying visitor(s) for the recording of patient appointment to facilitate this note. I attest to having reviewed and edited the generated note for accuracy, though some syntax or spelling errors may persist. Please contact the author of this note for any clarification.         [1]   Social History  Socioeconomic History    Marital status:     Number of children: 2   Occupational History    Occupation: retired - used to make prosthesis and dental implants   Tobacco Use    Smoking status: Former     Current packs/day: 0.00     Average packs/day: 1 pack/day for 6.0 years (6.0 ttl pk-yrs)     Types: Cigarettes     Start date:      Quit date:      Years since quittin.3    Smokeless tobacco: Never    Tobacco comments:     quit when first got pregnant   Substance and Sexual Activity    Alcohol use: Yes     Alcohol/week:  5.0 standard drinks of alcohol     Types: 5 Drinks containing 0.5 oz of alcohol per week     Comment: 3/4 x a week    Drug use: Never    Sexual activity: Yes     Partners: Male     Birth control/protection: None     Social Drivers of Health     Financial Resource Strain: Low Risk  (4/1/2024)    Overall Financial Resource Strain (CARDIA)     Difficulty of Paying Living Expenses: Not hard at all   Food Insecurity: No Food Insecurity (4/1/2024)    Hunger Vital Sign     Worried About Running Out of Food in the Last Year: Never true     Ran Out of Food in the Last Year: Never true   Transportation Needs: No Transportation Needs (4/1/2024)    PRAPARE - Transportation     Lack of Transportation (Medical): No     Lack of Transportation (Non-Medical): No   Physical Activity: Insufficiently Active (4/1/2024)    Exercise Vital Sign     Days of Exercise per Week: 2 days     Minutes of Exercise per Session: 10 min   Stress: No Stress Concern Present (4/1/2024)    [employer] [place] [employer] - Occupational Stress Questionnaire     Feeling of Stress : Not at all   Housing Stability: High Risk (4/1/2024)    Housing Stability Vital Sign     Unable to Pay for Housing in the Last Year: Yes     Number of Places Lived in the Last Year: 1     Unstable Housing in the Last Year: Yes

## 2025-05-13 LAB
25(OH)D3+25(OH)D2 SERPL-MCNC: 63 NG/ML (ref 30–96)
EAG (OHS): 105 MG/DL (ref 68–131)
HBA1C MFR BLD: 5.3 % (ref 4–5.6)

## 2025-05-21 ENCOUNTER — RESULTS FOLLOW-UP (OUTPATIENT)
Dept: FAMILY MEDICINE | Facility: CLINIC | Age: 70
End: 2025-05-21

## 2025-05-21 DIAGNOSIS — D75.89 MACROCYTOSIS: Primary | ICD-10-CM

## 2025-05-22 ENCOUNTER — E-CONSULT (OUTPATIENT)
Dept: HEMATOLOGY/ONCOLOGY | Facility: CLINIC | Age: 70
End: 2025-05-22
Payer: MEDICARE

## 2025-05-22 DIAGNOSIS — D75.89 MACROCYTOSIS: Primary | ICD-10-CM

## 2025-05-22 NOTE — CONSULTS
[facility]  Response for E-Consult     Patient Name: Lula De Leon  MRN: [id number]  Primary Care Provider: Sylvain Haywood MD   Requesting Provider: Sylvain Haywood MD  E-Consult to [place]  Consult performed by: Ravi Romero MD  Consult ordered by: Sylvain Haywood MD  Reason for consult: Any further evaluation recommended for macrocytosis  Assessment/Recommendations: Chronic macrocytosis, currently , was as high as 107 2 years ago.     I do not see folic acid or b12 levels in past 2 years.          Recommendation: Check B12 and folic acid levels, replete as clinically indicated based on results. Clinical correlation with alcohol consumption recommended as alcohol consumption is associated with increase in MCV. If all of the above is normal, I would suspect patient's methrotrexate prescription is likely the cause of macrocytosis.    Additional future steps to consider: as above    Total time of Consultation: 10 minute    I did not speak to the requesting provider verbally about this.     *This eConsult is based on the clinical data available to me and is furnished without benefit of a physical examination. The eConsult will need to be interpreted in light of any clinical issues or changes in patient status not available to me at the time of filing this eConsults. Significant changes in patient condition or level of acuity should result in immediate formal consultation and reevaluation. Please alert me if you have further questions.    Thank you for this eConsult referral.     Ravi Romero MD  [facility]

## 2025-05-23 ENCOUNTER — HOSPITAL ENCOUNTER (OUTPATIENT)
Dept: RADIOLOGY | Facility: HOSPITAL | Age: 70
Discharge: HOME | End: 2025-05-23
Attending: INTERNAL MEDICINE
Payer: MEDICARE

## 2025-05-23 DIAGNOSIS — Z12.31 BREAST CANCER SCREENING BY MAMMOGRAM: ICD-10-CM

## 2025-05-23 DIAGNOSIS — Z00.00 HEALTHCARE MAINTENANCE: ICD-10-CM

## 2025-05-23 PROCEDURE — 77063 BREAST TOMOSYNTHESIS BI: CPT | Mod: TC

## 2025-05-23 PROCEDURE — 77063 BREAST TOMOSYNTHESIS BI: CPT | Mod: 26,,, | Performed by: RADIOLOGY

## 2025-05-23 PROCEDURE — 77067 SCR MAMMO BI INCL CAD: CPT | Mod: 26,,, | Performed by: RADIOLOGY

## 2025-05-27 DIAGNOSIS — M06.9 RHEUMATOID ARTHRITIS INVOLVING BOTH HANDS, UNSPECIFIED WHETHER RHEUMATOID FACTOR PRESENT: ICD-10-CM

## 2025-05-27 DIAGNOSIS — E78.5 HYPERLIPIDEMIA, UNSPECIFIED HYPERLIPIDEMIA TYPE: ICD-10-CM

## 2025-05-27 RX ORDER — ATORVASTATIN CALCIUM 20 MG/1
20 TABLET, FILM COATED ORAL DAILY
Qty: 90 TABLET | Refills: 3 | Status: SHIPPED | OUTPATIENT
Start: 2025-05-27

## 2025-05-27 NOTE — TELEPHONE ENCOUNTER
No care due was identified.  [facility] Embedded Care Due Messages. Reference number: [id number].   5/27/2025 3:32:53 PM CDT

## 2025-05-27 NOTE — TELEPHONE ENCOUNTER
Refill Decision Note   Lula De Leon  is requesting a refill authorization.  Brief Assessment and Rationale for Refill:  Approve     Medication Therapy Plan:        Comments:     Note composed:3:59 PM 05/27/2025

## 2025-06-30 DIAGNOSIS — K21.9 GASTROESOPHAGEAL REFLUX DISEASE, UNSPECIFIED WHETHER ESOPHAGITIS PRESENT: ICD-10-CM

## 2025-07-05 RX ORDER — PANTOPRAZOLE SODIUM 40 MG/1
40 TABLET, DELAYED RELEASE ORAL DAILY
Qty: 90 TABLET | Refills: 0 | Status: SHIPPED | OUTPATIENT
Start: 2025-07-05

## 2025-07-23 ENCOUNTER — PATIENT OUTREACH (OUTPATIENT)
Dept: ADMINISTRATIVE | Facility: HOSPITAL | Age: 70
End: 2025-07-23
Payer: MEDICARE

## 2025-07-23 NOTE — PROGRESS NOTES
Pt declines colon cancer screenings and osteoporosis screenings. Immunization's updated/triggered. Gap report updated.

## 2025-08-01 ENCOUNTER — OFFICE VISIT (OUTPATIENT)
Dept: RHEUMATOLOGY | Facility: CLINIC | Age: 70
End: 2025-08-01
Payer: MEDICARE

## 2025-08-01 VITALS
DIASTOLIC BLOOD PRESSURE: 74 MMHG | OXYGEN SATURATION: 96 % | HEIGHT: 62 IN | HEART RATE: 78 BPM | SYSTOLIC BLOOD PRESSURE: 126 MMHG | BODY MASS INDEX: 27.63 KG/M2 | WEIGHT: 150.13 LBS

## 2025-08-01 DIAGNOSIS — M15.0 PRIMARY OSTEOARTHRITIS INVOLVING MULTIPLE JOINTS: Primary | ICD-10-CM

## 2025-08-01 DIAGNOSIS — Z79.60 IMMUNODEFICIENCY DUE TO TREATMENT WITH IMMUNOSUPPRESSIVE MEDICATION: ICD-10-CM

## 2025-08-01 DIAGNOSIS — Z71.89 COUNSELING AND COORDINATION OF CARE: ICD-10-CM

## 2025-08-01 DIAGNOSIS — M06.9 RHEUMATOID ARTHRITIS INVOLVING BOTH HANDS, UNSPECIFIED WHETHER RHEUMATOID FACTOR PRESENT: ICD-10-CM

## 2025-08-01 DIAGNOSIS — T45.1X5A IMMUNODEFICIENCY DUE TO TREATMENT WITH IMMUNOSUPPRESSIVE MEDICATION: ICD-10-CM

## 2025-08-01 DIAGNOSIS — D84.821 IMMUNODEFICIENCY DUE TO TREATMENT WITH IMMUNOSUPPRESSIVE MEDICATION: ICD-10-CM

## 2025-08-01 PROCEDURE — 1159F MED LIST DOCD IN RCRD: CPT | Mod: CPTII,S$GLB,, | Performed by: STUDENT IN AN ORGANIZED HEALTH CARE EDUCATION/TRAINING PROGRAM

## 2025-08-01 PROCEDURE — 4010F ACE/ARB THERAPY RXD/TAKEN: CPT | Mod: CPTII,S$GLB,, | Performed by: STUDENT IN AN ORGANIZED HEALTH CARE EDUCATION/TRAINING PROGRAM

## 2025-08-01 PROCEDURE — 3008F BODY MASS INDEX DOCD: CPT | Mod: CPTII,S$GLB,, | Performed by: STUDENT IN AN ORGANIZED HEALTH CARE EDUCATION/TRAINING PROGRAM

## 2025-08-01 PROCEDURE — 3078F DIAST BP <80 MM HG: CPT | Mod: CPTII,S$GLB,, | Performed by: STUDENT IN AN ORGANIZED HEALTH CARE EDUCATION/TRAINING PROGRAM

## 2025-08-01 PROCEDURE — 1160F RVW MEDS BY RX/DR IN RCRD: CPT | Mod: CPTII,S$GLB,, | Performed by: STUDENT IN AN ORGANIZED HEALTH CARE EDUCATION/TRAINING PROGRAM

## 2025-08-01 PROCEDURE — 1126F AMNT PAIN NOTED NONE PRSNT: CPT | Mod: CPTII,S$GLB,, | Performed by: STUDENT IN AN ORGANIZED HEALTH CARE EDUCATION/TRAINING PROGRAM

## 2025-08-01 PROCEDURE — 1101F PT FALLS ASSESS-DOCD LE1/YR: CPT | Mod: CPTII,S$GLB,, | Performed by: STUDENT IN AN ORGANIZED HEALTH CARE EDUCATION/TRAINING PROGRAM

## 2025-08-01 PROCEDURE — 99214 OFFICE O/P EST MOD 30 MIN: CPT | Mod: S$GLB,,, | Performed by: STUDENT IN AN ORGANIZED HEALTH CARE EDUCATION/TRAINING PROGRAM

## 2025-08-01 PROCEDURE — 3074F SYST BP LT 130 MM HG: CPT | Mod: CPTII,S$GLB,, | Performed by: STUDENT IN AN ORGANIZED HEALTH CARE EDUCATION/TRAINING PROGRAM

## 2025-08-01 PROCEDURE — 3288F FALL RISK ASSESSMENT DOCD: CPT | Mod: CPTII,S$GLB,, | Performed by: STUDENT IN AN ORGANIZED HEALTH CARE EDUCATION/TRAINING PROGRAM

## 2025-08-01 PROCEDURE — G2211 COMPLEX E/M VISIT ADD ON: HCPCS | Mod: S$GLB,,, | Performed by: STUDENT IN AN ORGANIZED HEALTH CARE EDUCATION/TRAINING PROGRAM

## 2025-08-01 PROCEDURE — 3044F HG A1C LEVEL LT 7.0%: CPT | Mod: CPTII,S$GLB,, | Performed by: STUDENT IN AN ORGANIZED HEALTH CARE EDUCATION/TRAINING PROGRAM

## 2025-08-01 PROCEDURE — 99999 PR PBB SHADOW E&M-EST. PATIENT-LVL IV: CPT | Mod: PBBFAC,,, | Performed by: STUDENT IN AN ORGANIZED HEALTH CARE EDUCATION/TRAINING PROGRAM

## 2025-08-01 RX ORDER — UPADACITINIB 15 MG/1
15 TABLET, EXTENDED RELEASE ORAL DAILY
Qty: 30 TABLET | Refills: 11 | Status: ACTIVE | OUTPATIENT
Start: 2025-08-01

## 2025-08-01 RX ORDER — UPADACITINIB 15 MG/1
15 TABLET, EXTENDED RELEASE ORAL DAILY
Qty: 30 TABLET | Refills: 11 | Status: SHIPPED | OUTPATIENT
Start: 2025-08-01 | End: 2025-08-01

## 2025-08-01 NOTE — PROGRESS NOTES
RHEUMATOLOGY OUTPATIENT CLINIC NOTE    8/2/2025    Attending Rheumatologist: Nate Daniels  Primary Care Provider: Sylvain Haywood MD   Physician Requesting Consultation: No referring provider defined for this encounter.  Chief Complaint/Reason For Consultation:  Establish Care        Subjective:       JOSSY De Leon is a 69 y.o. White female with Hx of Seropositive RA who comes to establish care.     She is new to me. Used to follow up with Dr. Hooks     Patient diagnosed in 2017 . Reports it started in the wrist as swelling and pain, could barely move it. Then was seen by a Rheumatologist which placed her on therapy eventually escalating to Triple Therapy (SSZ, MTX, HCQ) for control. Was on MTX 8 tabs, SSZ 4 and decreased to MTX 4 and SSZ 2 in August due to Renal Function.   Patient reports good response on current therapy. Denies joint swelling, tenderness. Reports about 10-15 of morning stiffness. Reports also lower back and knee pain. Most typically with use. Was prior on NSAIDs but stopped, now on Tylenol with good relief.   FHx: Possible Mom, and Aunt with RA     LV  -Last visit on 10/2023 with Dr. Hooks   -She is doing well now. She tried to tapered off methotrexate after last visit but noted worsening joint pain so she resumed methotrexate at 5 mg PO QW (2 tabs) and daily folic acid. She continues to take Rinvoq daily. Denies any side effects. No recurrent infections.   -She is off HCQ, SSZ.   -Labs from 10/2023 showed normal CBC and CMP    9/17/24: Patient here for follow up of RA   She is establishing care with the clinic- used to follow Dr. Hooks  Last appt 2/2024  - Patient is off HCQ and SSZ   - Last labs 9/17 and infectious screening normal     8/1/2025:   History of Present Illness    Patient presents today for follow up of rheumatoid arthritis. She takes methotrexate intermittently, approximately every couple of weeks, due to occasional stomach upset. She reports having 4 days of  medication remaining and that her current medication regimen is working effectively for her condition. She experiences pain associated with osteoarthritis, characterized by end-of-day discomfort. She manages pain with Tylenol and reports that the pain is currently not severe. She acknowledges osteoarthritis as a condition related to age and overuse. Recent labs from May demonstrate normal liver and kidney function with slightly elevated white blood count. Bleeding tests were normal. Primary care ordered hematology consult for elevated lab numbers and B12 and folate assessment.      ROS:  General: -fever, -chills, -fatigue, -weight gain, -weight loss  Eyes: -vision changes, -redness, -discharge  ENT: -ear pain, -nasal congestion, -sore throat  Cardiovascular: -chest pain, -palpitations, -lower extremity edema  Respiratory: -cough, -shortness of breath  Gastrointestinal: -abdominal pain, -nausea, -vomiting, -diarrhea, -constipation, -blood in stool, +indigestion  Genitourinary: -dysuria, -hematuria, -frequency  Musculoskeletal: +joint pain, -muscle pain  Skin: -rash, -lesion  Neurological: -headache, -dizziness, -numbness, -tingling  Psychiatric: -anxiety, -depression, -sleep difficulty           Chronic comorbid conditions affecting medical decision making today:  Past Medical History:   Diagnosis Date    Basal cell carcinoma of right ear 10/18/2022    Rheumatoid arthritis      Past Surgical History:   Procedure Laterality Date    BREAST MASS EXCISION Right     exc biopsy    BREAST SURGERY      lobectomy 2017 - [facility]    CARPAL TUNNEL RELEASE Right     EYE SURGERY  early adulthood    SINUS SURGERY      Dr. Christel FLORES and Dr. Chilel    TONSILLECTOMY  as a kid    TUBAL LIGATION       Family History   Problem Relation Name Age of Onset    Arthritis Mother martha             Aneurysm Mother martha 64    Hypertension Mother martha             Stroke Mother martha              Heart attack Father houston SR 70    Hypertension Father houston NIEVES             Epilepsy Sister      Alcohol abuse Brother houston vaughan             Drug abuse Brother houston vaughan              Social History     Substance and Sexual Activity   Alcohol Use Yes    Alcohol/week: 5.0 standard drinks of alcohol    Types: 5 Drinks containing 0.5 oz of alcohol per week    Comment: 3/4 x a week     Social History     Tobacco Use   Smoking Status Former    Current packs/day: 0.00    Average packs/day: 1 pack/day for 6.0 years (6.0 ttl pk-yrs)    Types: Cigarettes    Start date:     Quit date:     Years since quittin.6   Smokeless Tobacco Never   Tobacco Comments    quit when first got pregnant     Social History     Substance and Sexual Activity   Drug Use Never       Current Outpatient Medications:     ascorbic acid, vitamin C, (VITAMIN C) 500 MG tablet, Take 500 mg by mouth., Disp: , Rfl:     atorvastatin (LIPITOR) 20 MG tablet, Take 1 tablet (20 mg total) by mouth once daily., Disp: 90 tablet, Rfl: 3    beta carotene 80381 UNIT capsule, Take 25,000 Units by mouth., Disp: , Rfl:     busPIRone (BUSPAR) 10 MG tablet, Take 1 tablet (10 mg total) by mouth 2 (two) times daily., Disp: 60 tablet, Rfl: 6    calcium carbonate (OS-ONEIDA) 600 mg calcium (1,500 mg) Tab, Take 600 mg by mouth., Disp: , Rfl:     cetirizine (ZYRTEC) 10 MG tablet, Take 10 mg by mouth., Disp: , Rfl:     cyanocobalamin (VITAMIN B-12) 1000 MCG tablet, Take 1,000 mcg by mouth., Disp: , Rfl:     diclofenac sodium (VOLTAREN) 1 % Gel, Apply 2 g topically 4 (four) times daily., Disp: 1 Tube, Rfl: 3    richa prim-linoleic-gamolenic ac (PRIMROSE OIL) 1,000 mg Cap, Take by mouth., Disp: , Rfl:     flaxseed oil Oil, 800 mg by Other route., Disp: , Rfl:     fluorouraciL (EFUDEX) 5 % cream, Apply to specific areas bilateral lower legs 2x/day for 4 weeks and right medial cheek 2x/day x 2 weeks, Disp: 40 g, Rfl: 1    folic acid  (FOLVITE) 1 MG tablet, Take 1 tablet (1 mg total) by mouth once daily., Disp: 90 tablet, Rfl: 0    iron bisgly,ps-FA-B-C#12-succ 65 mg-65 mg -1,000 mcg (24) Tab, Take by mouth., Disp: , Rfl:     losartan (COZAAR) 25 MG tablet, Take 2 tablets (50 mg total) by mouth once daily., Disp: 180 tablet, Rfl: 3    MAGNESIUM CARBONATE ORAL, Take 600 mg by mouth., Disp: , Rfl:     methotrexate 2.5 MG Tab, Take 2 tablets (5 mg total) by mouth every 7 days., Disp: 24 tablet, Rfl: 0    omega-3 fatty acids/fish oil (FISH OIL-OMEGA-3 FATTY ACIDS) 300-1,000 mg capsule, Take 2 g by mouth., Disp: , Rfl:     ondansetron (ZOFRAN-ODT) 4 MG TbDL, Take 1 tablet (4 mg total) by mouth every 8 (eight) hours as needed (nausea)., Disp: 20 tablet, Rfl: 1    pantoprazole (PROTONIX) 40 MG tablet, Take 1 tablet (40 mg total) by mouth once daily., Disp: 90 tablet, Rfl: 0    phentermine (ADIPEX-P) 37.5 mg tablet, Take 1 tablet (37.5 mg total) by mouth before breakfast., Disp: 30 tablet, Rfl: 2    vitamin E 400 UNIT capsule, Take 400 Units by mouth., Disp: , Rfl:     zinc gluconate 50 mg tablet, Take 50 mg by mouth., Disp: , Rfl:     upadacitinib (RINVOQ) 15 mg 24 hr tablet, Take 1 tablet (15 mg total) by mouth once daily., Disp: 30 tablet, Rfl: 11     Objective:         Vitals:    08/01/25 1304   BP: 126/74   Pulse: 78       Physical Exam   Constitutional: She is oriented to person, place, and time.   HENT:   Head: Normocephalic.   Mouth/Throat: Oropharynx is clear and moist. Mucous membranes are moist.   Pulmonary/Chest: Effort normal.   Abdominal: Soft.   Musculoskeletal:      Right shoulder: Normal.      Left shoulder: Normal.      Right elbow: Normal.      Left elbow: Normal.      Right wrist: Normal.      Left wrist: Normal.      Cervical back: Normal range of motion and neck supple.      Right knee: Normal.      Left knee: Normal.      Comments: Can make fist, no synovitis  Knee crepitus     Neurological: She is alert and oriented to person,  "place, and time.   Skin: No rash noted. No erythema.   Psychiatric: Mood and affect normal.       Right Side Rheumatological Exam     Examination finds the shoulder, elbow, wrist, knee, 1st PIP, 1st MCP, 2nd PIP, 2nd MCP, 3rd PIP, 3rd MCP, 4th PIP, 4th MCP, 5th PIP and 5th MCP normal.    Knee Exam   Patellofemoral Crepitus: positive    Left Side Rheumatological Exam     Examination finds the shoulder, elbow, wrist, knee, 1st PIP, 1st MCP, 2nd PIP, 2nd MCP, 3rd PIP, 3rd MCP, 4th PIP, 4th MCP, 5th PIP and 5th MCP normal.    Knee Exam     Patellofemoral Crepitus: positive          Reviewed old and all outside pertinent medical records available.    All lab results personally reviewed and interpreted by me.  Lab Results   Component Value Date    WBC 6.62 05/12/2025    HGB 12.7 05/12/2025    HCT 39.2 05/12/2025     (H) 05/12/2025    MCH 33.2 (H) 05/12/2025    MCHC 32.4 05/12/2025    RDW 13.6 05/12/2025     05/12/2025    MPV 9.7 05/12/2025       Lab Results   Component Value Date     05/12/2025    K 4.2 05/12/2025     05/12/2025    CO2 24 05/12/2025    GLU 90 05/12/2025    BUN 22 05/12/2025    CALCIUM 9.8 05/12/2025    PROT 8.0 05/12/2025    ALBUMIN 4.1 05/12/2025    BILITOT 0.5 05/12/2025    AST 28 05/12/2025    ALKPHOS 54 05/12/2025    ALT 35 05/12/2025       No results found for: "COLORU", "APPEARANCEUA", "SPECGRAV", "PHUR", "PHUA", "PROTEINUA", "GLUCOSEU", "KETONESU", "BLOODU", "LEUKOCYTESUR", "NITRITE", "UROBILINOGEN"    Lab Results   Component Value Date    CRP 3.0 11/03/2020       Lab Results   Component Value Date    SEDRATE 20 11/03/2020       Lab Results   Component Value Date    .0 (H) 11/03/2020    SEDRATE 20 11/03/2020           Imaging:  All imaging reviewed and independently interpreted by me.       ASSESSMENT / PLAN:     Lula De Leon is a 69 y.o. White female with:    1. Rheumatoid arthritis involving both hands, unspecified whether rheumatoid factor present  - Patient " with known diagnosis of seropositive RA  - Prev meds: SSZ and HCQ, both tapered off due to control on rinvoq.   - attempted taper methotrexate in 10/2023 but joint pain recurred. Currenly on MTX 2 tabs weekly and symptoms are controlled.   - continue rinvoq daily. Samples provided to patient  (# 10 - Exp date 9/22/2025, LOT [id number])  - labs every 4-6 months   - reassurance  - consider TNF, Xeljanz, IL-6 if worsening symptoms      2. Low Back and Knee Pain likely OA   -continue Tylenol PRN  -Reassurance and Exercise     3. DMARD Toxicity Monitoring   -monitor labs  -Daily FA  -no live vaccines  -vaccines per guidelines. Strongly suggested patient to get shingles vaccine.   -immunosuppression/infectious precautions reinforced     4. Other specified counseling  - over 10 minutes spent regarding below topics:  - Immunization counseling done.  - Nutrition and exercise counseling.  - Limitation of alcohol consumption.  - Regular exercise:  Aerobic and resistance.  - Medication counseling provided.      Follow up in about 6 months (around 2/1/2026).    Method of contact with patient concerns: [facility]    Disclaimer:  This note is prepared using voice recognition software and as such is likely to have errors and has not been proof read. Please contact me for questions.     Time spent: 30 minutes in face to face discussion concerning diagnosis, prognosis, review of lab and test results, benefits of treatment as well as management of disease, counseling of patient and coordination of care between various health care providers.  Greater than half the time spent was used for coordination of care and counseling of patient.    Today's visit is associated with current or anticipated ongoing medical care related to this patient's diagnosis of rheumatoid arthritis which is a chronic disease which will require regular follow up to manage symptoms and progression. The patient is to return to the office within a  minimum of 3-6 months to review symptoms and function at that time. CPT code      Nate Daniels M.D.  Rheumatology Department   [facility]

## 2025-08-15 ENCOUNTER — PATIENT MESSAGE (OUTPATIENT)
Dept: RHEUMATOLOGY | Facility: CLINIC | Age: 70
End: 2025-08-15
Payer: MEDICARE